# Patient Record
Sex: FEMALE | Race: BLACK OR AFRICAN AMERICAN | Employment: FULL TIME | URBAN - METROPOLITAN AREA
[De-identification: names, ages, dates, MRNs, and addresses within clinical notes are randomized per-mention and may not be internally consistent; named-entity substitution may affect disease eponyms.]

---

## 2017-03-27 ENCOUNTER — OFFICE VISIT (OUTPATIENT)
Dept: FAMILY MEDICINE CLINIC | Age: 32
End: 2017-03-27

## 2017-03-27 ENCOUNTER — HOSPITAL ENCOUNTER (OUTPATIENT)
Dept: LAB | Age: 32
Discharge: HOME | End: 2017-03-27

## 2017-03-27 VITALS
SYSTOLIC BLOOD PRESSURE: 100 MMHG | HEART RATE: 71 BPM | HEIGHT: 66 IN | OXYGEN SATURATION: 99 % | BODY MASS INDEX: 22.79 KG/M2 | TEMPERATURE: 98.8 F | WEIGHT: 141.8 LBS | RESPIRATION RATE: 16 BRPM | DIASTOLIC BLOOD PRESSURE: 68 MMHG

## 2017-03-27 DIAGNOSIS — N94.6 DYSMENORRHEA: ICD-10-CM

## 2017-03-27 DIAGNOSIS — Z11.3 SCREENING FOR STD (SEXUALLY TRANSMITTED DISEASE): ICD-10-CM

## 2017-03-27 DIAGNOSIS — R33.9 URINARY RETENTION: Primary | ICD-10-CM

## 2017-03-27 DIAGNOSIS — Z13.1 SCREENING FOR DIABETES MELLITUS: ICD-10-CM

## 2017-03-27 DIAGNOSIS — R33.9 URINARY RETENTION: ICD-10-CM

## 2017-03-27 DIAGNOSIS — Z13.220 SCREENING FOR HYPERLIPIDEMIA: ICD-10-CM

## 2017-03-27 DIAGNOSIS — N80.9 ENDOMETRIOSIS: ICD-10-CM

## 2017-03-27 LAB
BILIRUB UR QL STRIP: NEGATIVE
GLUCOSE UR-MCNC: NEGATIVE MG/DL
KETONES P FAST UR STRIP-MCNC: NORMAL MG/DL
PH UR STRIP: 6 [PH] (ref 4.6–8)
PROT UR QL STRIP: NEGATIVE MG/DL
SP GR UR STRIP: 1.02 (ref 1–1.03)
UA UROBILINOGEN AMB POC: NORMAL (ref 0.2–1)
URINALYSIS CLARITY POC: NORMAL
URINALYSIS COLOR POC: YELLOW
URINE BLOOD POC: NEGATIVE
URINE LEUKOCYTES POC: NORMAL
URINE NITRITES POC: NEGATIVE

## 2017-03-27 PROCEDURE — 99001 SPECIMEN HANDLING PT-LAB: CPT | Performed by: FAMILY MEDICINE

## 2017-03-27 RX ORDER — NAPROXEN 500 MG/1
500 TABLET ORAL 2 TIMES DAILY WITH MEALS
Qty: 60 TAB | Refills: 1 | Status: SHIPPED | OUTPATIENT
Start: 2017-03-27 | End: 2018-09-07

## 2017-03-27 RX ORDER — IBUPROFEN 200 MG
TABLET ORAL
COMMUNITY
End: 2017-03-27

## 2017-03-27 NOTE — MR AVS SNAPSHOT
Visit Information Date & Time Provider Department Dept. Phone Encounter #  
 3/27/2017  2:15 PM Tye Kyle, 0 [facility] [phone] [id number] Follow-up Instructions Return in about 3 months (around 6/27/2017). Upcoming Health Maintenance Date Due  
 PAP AKA CERVICAL CYTOLOGY 1/5/2018 DTaP/Tdap/Td series (2 - Td) 3/27/2027 Allergies as of 3/27/2017  Review Complete On: 3/27/2017 By: Tye Kyle MD  
 No Known Allergies Current Immunizations  Never Reviewed Name Date Influenza Vaccine 11/1/2016 Not reviewed this visit You Were Diagnosed With   
  
 Codes Comments Urinary retention    -  Primary ICD-10-CM: R33.9 ICD-9-CM: 788.20 Endometriosis     ICD-10-CM: N80.9 ICD-9-CM: 617.9 Dysmenorrhea     ICD-10-CM: N94.6 ICD-9-CM: 625.3 Screening for diabetes mellitus     ICD-10-CM: Z13.1 ICD-9-CM: V77.1 Screening for hyperlipidemia     ICD-10-CM: Z13.220 ICD-9-CM: V77.91 Screening for STD (sexually transmitted disease)     ICD-10-CM: Z11.3 ICD-9-CM: V74.5 Vitals BP Pulse Temp Resp Height(growth percentile) Weight(growth percentile) 100/68 (BP 1 Location: Left arm, BP Patient Position: Sitting) 71 98.8 °F (37.1 °C) (Oral) 16 5' 5.5\" (1.664 m) 141 lb 12.8 oz (64.3 kg) LMP SpO2 BMI Smoking Status 03/01/2017 99% 23.24 kg/m2 Never Smoker Vitals History BMI and BSA Data Body Mass Index Body Surface Area  
 23.24 kg/m 2 1.72 m 2 Preferred Pharmacy Pharmacy Name Phone [facility], [address] [place] [address] [phone] Your Updated Medication List  
  
   
This list is accurate as of: 3/27/17  3:27 PM.  Always use your most recent med list.  
  
  
  
  
 naproxen 500 mg tablet Commonly known as:  NAPROSYN Take 1 Tab by mouth two (2) times daily (with meals). Prescriptions Sent to Pharmacy Refills naproxen (NAPROSYN) 500 mg tablet 1 Sig: Take 1 Tab by mouth two (2) times daily (with meals). Class: Normal  
 Pharmacy: [id number] [facility], [address] [place] [address] #: [phone] Route: Oral  
  
We Performed the Following AMB POC URINALYSIS DIP STICK AUTO W/O MICRO [47849 CPT(R)] REFERRAL TO OBSTETRICS AND GYNECOLOGY [REF51 Custom] Comments:  
 Please evaluate patient for endometriosis. , chronic pelvic pain. Routine gyn care Follow-up Instructions Return in about 3 months (around 6/27/2017). To-Do List   
 03/27/2017 Lab:  CBC W/O DIFF   
  
 03/27/2017 Lab:  Murali Sandoval / Demetris Roberson   
  
 03/27/2017 Microbiology:  CULTURE, URINE   
  
 03/27/2017 Lab:  HEMOGLOBIN A1C WITH EAG   
  
 03/27/2017 Lab:  LIPID PANEL   
  
 03/27/2017 Lab:  METABOLIC PANEL, COMPREHENSIVE   
  
 03/27/2017 Lab:  RPR   
  
 03/27/2017 Lab:  TSH 3RD GENERATION   
  
 04/26/2017 Lab:  HIV 1/2 AG/AB, 4TH GENERATION,W RFLX CONFIRM Referral Information Referral ID Referred By Referred To  
  
 [id number] [facility] R Not Available Visits Status Start Date End Date 1 New Request 3/27/17 3/27/18 If your referral has a status of pending review or denied, additional information will be sent to support the outcome of this decision. Patient Instructions Endometriosis: Care Instructions Your Care Instructions Cells that are like the cells that line the inside your womb (uterus) sometimes grow on the outside of the uterus. This is called endometriosis. These clumps of cells can cause pain and problems with your periods. They can become inflamed and may bleed. Scar tissue that forms over time can make it difficult to get pregnant. Medicines and sometimes surgery can relieve pain and help women who want to get pregnant. Follow-up care is a key part of your treatment and safety.  Be sure to make and go to all appointments, and call your doctor if you are having problems. Its also a good idea to know your test results and keep a list of the medicines you take. How can you care for yourself at home? · Take your medicines exactly as prescribed. Call your doctor if you think you are having a problem with your medicine. · Take pain medicines exactly as directed. ¨ If the doctor gave you a prescription medicine for pain, take it as prescribed. ¨ If you are not taking a prescription pain medicine, ask your doctor if you can take an over-the-counter medicine. · Apply heat, such as a hot water bottle or a heating pad set on low, to your lower belly. Or take a warm bath. Heat may relieve pain. · Lie down and put a pillow under your knees to raise your legs. This will relieve pressure on your back. When should you call for help? Call 911 anytime you think you may need emergency care. For example, call if: 
· You passed out (lost consciousness). Call your doctor now or seek immediate medical care if: 
· You have sudden, severe pain in your belly or pelvis. Watch closely for changes in your health, and be sure to contact your doctor if: 
· You have new belly or pelvic pain. · You think you may be pregnant. · You do not get better as expected. Where can you learn more? Go to http://digna-santiago.info/. Enter R959 in the search box to learn more about \"Endometriosis: Care Instructions. \" Current as of: February 25, 2016 Content Version: 11.1 © 7859-4496 [employer]. Care instructions adapted under license by [employer] (which disclaims liability or warranty for this information). If you have questions about a medical condition or this instruction, always ask your healthcare professional. [place] [address] any warranty or liability for your use of this information. Introducing \[facility] [place] [facility]\"" & HEALTH SERVICES! Judy Ureña introduces [employer] patient portal. Now you can access parts of your medical record, email your doctor's office, and request medication refills online. 1. In your internet browser, go to [email or web address] 2. Click on the First Time User? Click Here link in the Sign In box. You will see the New Member Sign Up page. 3. Enter your [employer] Access Code exactly as it appears below. You will not need to use this code after youve completed the sign-up process. If you do not sign up before the expiration date, you must request a new code. · [employer] Access Code: [email or web address] Expires: 6/25/2017  9:18 AM 
 
4. Enter the last four digits of your Social Security Number (xxxx) and Date of Birth (mm/dd/yyyy) as indicated and click Submit. You will be taken to the next sign-up page. 5. Create a [employer] ID. This will be your [employer] login ID and cannot be changed, so think of one that is secure and easy to remember. 6. Create a [employer] password. You can change your password at any time. 7. Enter your Password Reset Question and Answer. This can be used at a later time if you forget your password. 8. Enter your e-mail address. You will receive e-mail notification when new information is available in [address] 9. Click Sign Up. You can now view and download portions of your medical record. 10. Click the Download Summary menu link to download a portable copy of your medical information. If you have questions, please visit the Frequently Asked Questions section of the [employer] website. Remember, [employer] is NOT to be used for urgent needs. For medical emergencies, dial 911. Now available from your iPhone and Android! Please provide this summary of care documentation to your next provider. Your primary care clinician is listed as Shalonda Perkins. If you have any questions after today's visit, please call [phone].

## 2017-03-27 NOTE — PROGRESS NOTES
1. Have you been to the ER, urgent care clinic since your last visit? Hospitalized since your last visit? No    2. Have you seen or consulted any other health care providers outside of the [address] [place] [address] since your last visit? Include any pap smears or colon screening.  Yes [facility] 12/2015    Last pap smear - [facility] - 3/2016   Last flu vaccine - 11/2016

## 2017-03-27 NOTE — PATIENT INSTRUCTIONS
Endometriosis: Care Instructions  Your Care Instructions  Cells that are like the cells that line the inside your womb (uterus) sometimes grow on the outside of the uterus. This is called endometriosis. These clumps of cells can cause pain and problems with your periods. They can become inflamed and may bleed. Scar tissue that forms over time can make it difficult to get pregnant. Medicines and sometimes surgery can relieve pain and help women who want to get pregnant. Follow-up care is a key part of your treatment and safety. Be sure to make and go to all appointments, and call your doctor if you are having problems. Its also a good idea to know your test results and keep a list of the medicines you take. How can you care for yourself at home? · Take your medicines exactly as prescribed. Call your doctor if you think you are having a problem with your medicine. · Take pain medicines exactly as directed. ¨ If the doctor gave you a prescription medicine for pain, take it as prescribed. ¨ If you are not taking a prescription pain medicine, ask your doctor if you can take an over-the-counter medicine. · Apply heat, such as a hot water bottle or a heating pad set on low, to your lower belly. Or take a warm bath. Heat may relieve pain. · Lie down and put a pillow under your knees to raise your legs. This will relieve pressure on your back. When should you call for help? Call 911 anytime you think you may need emergency care. For example, call if:  · You passed out (lost consciousness). Call your doctor now or seek immediate medical care if:  · You have sudden, severe pain in your belly or pelvis. Watch closely for changes in your health, and be sure to contact your doctor if:  · You have new belly or pelvic pain. · You think you may be pregnant. · You do not get better as expected. Where can you learn more? Go to http://digna-santiago.info/.   Enter U061 in the search box to learn more about \"Endometriosis: Care Instructions. \"  Current as of: February 25, 2016  Content Version: 11.1  © 5386-1630 [employer]. Care instructions adapted under license by [employer] (which disclaims liability or warranty for this information). If you have questions about a medical condition or this instruction, always ask your healthcare professional. [address] any warranty or liability for your use of this information.

## 2017-03-27 NOTE — PROGRESS NOTES
HISTORY OF PRESENT ILLNESS  Erica Bhatt is a 27 y.o. female. HPI: Here as a new patient to get establish with PCP. She was following ob/gyn at [facility]. Has h/o endometriosis and prior h/o rt oophorectomy. Now she has been having on and off cyst over left ovaries and at a time mild to moderate pain. Taking otc NSAID for pain and sometimes does not help. Still has painful and heavy period at a time. Was on birth control pill but not any more as she was trying to get pregnant and was following fertility specialist and was getting expensive so stopped for sometime and now going to see them again. Visit Vitals    /68 (BP 1 Location: Left arm, BP Patient Position: Sitting)    Pulse 71    Temp 98.8 °F (37.1 °C) (Oral)    Resp 16    Ht 5' 5.5\" (1.664 m)    Wt 141 lb 12.8 oz (64.3 kg)    SpO2 99%    BMI 23.24 kg/m2     Denies any headache, dizziness, no chest pain or trouble breathing, no arm or leg weakness. No nausea or vomiting, no weight or appetite changes, no depression or anxiety. No bowel complains, no palpitation, no diaphoresis. Review medication list, vitals, problem list,allergies. Review past./ personal/ family / surgical history. Today also concern with some discomfort with urination. UA at the office showed no signs of infection. No dysuria. ROS: see HPI     Physical Exam   Constitutional: She is oriented to person, place, and time. No distress. Neck: No thyromegaly present. Cardiovascular: Normal rate, regular rhythm and normal heart sounds. Pulmonary/Chest:   CTA   Abdominal: Soft. Bowel sounds are normal. There is no tenderness. Musculoskeletal: She exhibits no edema. Lymphadenopathy:     She has no cervical adenopathy. Neurological: She is oriented to person, place, and time. Psychiatric: Thought content normal.       ASSESSMENT and PLAN    ICD-10-CM ICD-9-CM    1. Urinary retention: some discomfort. Able to pass urine. UA negative for infection. Discuss with her that adhesions from prior surgery can do that and current endometriosis and inflammation from it can make her feel discomfort. For now she will see fertility specialist and also referred her back to [facility] for ob/gyn. Due for pap smear as she has h/o abnormal pap smear and last was done in 2015. R33.9 788.20 AMB POC URINALYSIS DIP STICK AUTO W/O MICRO      CULTURE, URINE      REFERRAL TO OBSTETRICS AND GYNECOLOGY   2. Endometriosis/ removal of rt ovarie, now left ovarian cyst  N80.9 617.9 REFERRAL TO OBSTETRICS AND GYNECOLOGY      naproxen (NAPROSYN) 500 mg tablet   3. Dysmenorrhea: probably from endometriosis. Will follow ob/gyn recommendations. N94.6 625.3 REFERRAL TO OBSTETRICS AND GYNECOLOGY      CBC W/O DIFF      METABOLIC PANEL, COMPREHENSIVE      TSH 3RD GENERATION      naproxen (NAPROSYN) 500 mg tablet   4. Screening for diabetes mellitus Z13.1 V77.1 HEMOGLOBIN A1C WITH EAG   5. Screening for hyperlipidemia Z13.220 V77.91 LIPID PANEL   6. Screening for STD (sexually transmitted disease) Z11.3 V74.5 HIV 1/2 AG/AB, 4TH GENERATION,W RFLX CONFIRM      RPR      CHLAMYDIA / GC AMPLIFICATION   Pt understood and agrees with above plan. Review HM  She wants STD screening. Also was concern that day time nap she can not do. Sleeps well during night time and no concern. i have explain to her that some people has sleep cycle adjusted that way. For now will observe. Wants preventive labs to be done which ordered during visit. Follow-up Disposition:  Return in about 3 months (around 6/27/2017).

## 2017-03-29 LAB — BACTERIA UR CULT: NORMAL

## 2017-03-29 NOTE — PROGRESS NOTES
Spoke with patient (2 verifiers name/) regarding urine culture is negative. Patient voiced understanding.

## 2017-03-30 LAB
ALBUMIN SERPL-MCNC: 4.8 G/DL (ref 3.5–5.5)
ALBUMIN/GLOB SERPL: 1.3 {RATIO} (ref 1.2–2.2)
ALP SERPL-CCNC: 58 IU/L (ref 39–117)
ALT SERPL-CCNC: 15 IU/L (ref 0–32)
AST SERPL-CCNC: 27 IU/L (ref 0–40)
BILIRUB SERPL-MCNC: 0.6 MG/DL (ref 0–1.2)
BUN SERPL-MCNC: 11 MG/DL (ref 6–20)
BUN/CREAT SERPL: 11 (ref 8–20)
C TRACH RRNA SPEC QL NAA+PROBE: NEGATIVE
CALCIUM SERPL-MCNC: 10 MG/DL (ref 8.7–10.2)
CHLORIDE SERPL-SCNC: 96 MMOL/L (ref 96–106)
CHOLEST SERPL-MCNC: 166 MG/DL (ref 100–199)
CO2 SERPL-SCNC: 26 MMOL/L (ref 18–29)
CREAT SERPL-MCNC: 0.96 MG/DL (ref 0.57–1)
ERYTHROCYTE [DISTWIDTH] IN BLOOD BY AUTOMATED COUNT: 16.9 % (ref 12.3–15.4)
EST. AVERAGE GLUCOSE BLD GHB EST-MCNC: 114 MG/DL
GLOBULIN SER CALC-MCNC: 3.6 G/DL (ref 1.5–4.5)
GLUCOSE SERPL-MCNC: 73 MG/DL (ref 65–99)
HBA1C MFR BLD: 5.6 % (ref 4.8–5.6)
HCT VFR BLD AUTO: 38.4 % (ref 34–46.6)
HDLC SERPL-MCNC: 55 MG/DL
HGB BLD-MCNC: 11.8 G/DL (ref 11.1–15.9)
HIV 1+2 AB+HIV1 P24 AG SERPL QL IA: NON REACTIVE
INTERPRETATION, 910389: NORMAL
LDLC SERPL CALC-MCNC: 95 MG/DL (ref 0–99)
MCH RBC QN AUTO: 22.5 PG (ref 26.6–33)
MCHC RBC AUTO-ENTMCNC: 30.7 G/DL (ref 31.5–35.7)
MCV RBC AUTO: 73 FL (ref 79–97)
N GONORRHOEA RRNA SPEC QL NAA+PROBE: NEGATIVE
PLATELET # BLD AUTO: 275 X10E3/UL (ref 150–379)
POTASSIUM SERPL-SCNC: 3.9 MMOL/L (ref 3.5–5.2)
PROT SERPL-MCNC: 8.4 G/DL (ref 6–8.5)
RBC # BLD AUTO: 5.24 X10E6/UL (ref 3.77–5.28)
RPR SER QL: NON REACTIVE
SODIUM SERPL-SCNC: 138 MMOL/L (ref 134–144)
TRIGL SERPL-MCNC: 79 MG/DL (ref 0–149)
TSH SERPL DL<=0.005 MIU/L-ACNC: 2.14 UIU/ML (ref 0.45–4.5)
VLDLC SERPL CALC-MCNC: 16 MG/DL (ref 5–40)
WBC # BLD AUTO: 4.1 X10E3/UL (ref 3.4–10.8)

## 2017-03-31 NOTE — PROGRESS NOTES
Spoke with patient (2 verifies name/) regarding labs are ok including HIV and RPR test.  Patient voiced understanding.

## 2017-04-03 ENCOUNTER — TELEPHONE (OUTPATIENT)
Dept: FAMILY MEDICINE CLINIC | Age: 32
End: 2017-04-03

## 2017-04-11 NOTE — TELEPHONE ENCOUNTER
Patient came by the office and questions regarding lab test results were answered to patient's satisfaction. Closing encounter.

## 2017-11-07 ENCOUNTER — OFFICE VISIT (OUTPATIENT)
Dept: FAMILY MEDICINE CLINIC | Age: 32
End: 2017-11-07

## 2017-11-07 VITALS
HEART RATE: 69 BPM | SYSTOLIC BLOOD PRESSURE: 100 MMHG | OXYGEN SATURATION: 99 % | BODY MASS INDEX: 23.18 KG/M2 | RESPIRATION RATE: 16 BRPM | DIASTOLIC BLOOD PRESSURE: 66 MMHG | WEIGHT: 144.2 LBS | TEMPERATURE: 97.9 F | HEIGHT: 66 IN

## 2017-11-07 DIAGNOSIS — N80.9 ENDOMETRIOSIS: ICD-10-CM

## 2017-11-07 DIAGNOSIS — N89.8 VAGINAL ITCHING: ICD-10-CM

## 2017-11-07 DIAGNOSIS — F41.9 ANXIETY: ICD-10-CM

## 2017-11-07 DIAGNOSIS — R11.0 NAUSEA: ICD-10-CM

## 2017-11-07 DIAGNOSIS — Z00.00 WELL WOMAN EXAM (NO GYNECOLOGICAL EXAM): Primary | ICD-10-CM

## 2017-11-07 DIAGNOSIS — Z23 ENCOUNTER FOR IMMUNIZATION: ICD-10-CM

## 2017-11-07 RX ORDER — RANITIDINE 150 MG/1
150 TABLET, FILM COATED ORAL 2 TIMES DAILY
Qty: 60 TAB | Refills: 1 | Status: SHIPPED | OUTPATIENT
Start: 2017-11-07 | End: 2018-09-07

## 2017-11-07 NOTE — PROGRESS NOTES
1. Have you been to the ER, urgent care clinic since your last visit? Hospitalized since your last visit? No    2. Have you seen or consulted any other health care providers outside of the [address] [place] [address] since your last visit? Include any pap smears or colon screening. No    Naproxen is not helping with pain and wants to discuss. Last pap 6/05/17. Abnormal Pap smears Yes at 32 yo. Procedures if indicated - surgery for ovarian cyst.Form of contraception No. Mammogram No Family history of breast CA No, colon CA No, cervical CA No.Tetanus No.    Patient has not had flu vaccine; requests today.

## 2017-11-07 NOTE — PATIENT INSTRUCTIONS
Well Visit, Ages 25 to 48: Care Instructions  Your Care Instructions    Physical exams can help you stay healthy. Your doctor has checked your overall health and may have suggested ways to take good care of yourself. He or she also may have recommended tests. At home, you can help prevent illness with healthy eating, regular exercise, and other steps. Follow-up care is a key part of your treatment and safety. Be sure to make and go to all appointments, and call your doctor if you are having problems. It's also a good idea to know your test results and keep a list of the medicines you take. How can you care for yourself at home? · Reach and stay at a healthy weight. This will lower your risk for many problems, such as obesity, diabetes, heart disease, and high blood pressure. · Get at least 30 minutes of physical activity on most days of the week. Walking is a good choice. You also may want to do other activities, such as running, swimming, cycling, or playing tennis or team sports. Discuss any changes in your exercise program with your doctor. · Do not smoke or allow others to smoke around you. If you need help quitting, talk to your doctor about stop-smoking programs and medicines. These can increase your chances of quitting for good. · Talk to your doctor about whether you have any risk factors for sexually transmitted infections (STIs). Having one sex partner (who does not have STIs and does not have sex with anyone else) is a good way to avoid these infections. · Use birth control if you do not want to have children at this time. Talk with your doctor about the choices available and what might be best for you. · Protect your skin from too much sun. When you're outdoors from 10 a.m. to 4 p.m., stay in the shade or cover up with clothing and a hat with a wide brim. Wear sunglasses that block UV rays. Even when it's cloudy, put broad-spectrum sunscreen (SPF 30 or higher) on any exposed skin.   · See a dentist one or two times a year for checkups and to have your teeth cleaned. · Wear a seat belt in the car. · Drink alcohol in moderation, if at all. That means no more than 2 drinks a day for men and 1 drink a day for women. Follow your doctor's advice about when to have certain tests. These tests can spot problems early. For everyone  · Cholesterol. Have the fat (cholesterol) in your blood tested after age 21. Your doctor will tell you how often to have this done based on your age, family history, or other things that can increase your risk for heart disease. · Blood pressure. Have your blood pressure checked during a routine doctor visit. Your doctor will tell you how often to check your blood pressure based on your age, your blood pressure results, and other factors. · Vision. Talk with your doctor about how often to have a glaucoma test.  · Diabetes. Ask your doctor whether you should have tests for diabetes. · Colon cancer. Have a test for colon cancer at age 48. You may have one of several tests. If you are younger than 48, you may need a test earlier if you have any risk factors. Risk factors include whether you already had a precancerous polyp removed from your colon or whether your parent, brother, sister, or child has had colon cancer. For women  · Breast exam and mammogram. Talk to your doctor about when you should have a clinical breast exam and a mammogram. Medical experts differ on whether and how often women under 50 should have these tests. Your doctor can help you decide what is right for you. · Pap test and pelvic exam. Begin Pap tests at age 24. A Pap test is the best way to find cervical cancer. The test often is part of a pelvic exam. Ask how often to have this test.  · Tests for sexually transmitted infections (STIs). Ask whether you should have tests for STIs. You may be at risk if you have sex with more than one person, especially if your partners do not wear condoms.   For men  · Tests for sexually transmitted infections (STIs). Ask whether you should have tests for STIs. You may be at risk if you have sex with more than one person, especially if you do not wear a condom. · Testicular cancer exam. Ask your doctor whether you should check your testicles regularly. · Prostate exam. Talk to your doctor about whether you should have a blood test (called a PSA test) for prostate cancer. Experts differ on whether and when men should have this test. Some experts suggest it if you are older than 39 and are -American or have a father or brother who got prostate cancer when he was younger than 72. When should you call for help? Watch closely for changes in your health, and be sure to contact your doctor if you have any problems or symptoms that concern you. Where can you learn more? Go to http://digna-santiago.info/. Enter P072 in the search box to learn more about \"Well Visit, Ages 25 to 48: Care Instructions. \"  Current as of: May 12, 2017  Content Version: 11.4  © 2677-8994 [employer]. Care instructions adapted under license by [employer] (which disclaims liability or warranty for this information). If you have questions about a medical condition or this instruction, always ask your healthcare professional. [place] [address] any warranty or liability for your use of this information. Gastroesophageal Reflux Disease (GERD): Care Instructions  Your Care Instructions    Gastroesophageal reflux disease (GERD) is the backward flow of stomach acid into the esophagus. The esophagus is the tube that leads from your throat to your stomach. A one-way valve prevents the stomach acid from moving up into this tube. When you have GERD, this valve does not close tightly enough. If you have mild GERD symptoms including heartburn, you may be able to control the problem with antacids or over-the-counter medicine.  Changing your diet, losing weight, and making other lifestyle changes can also help reduce symptoms. Follow-up care is a key part of your treatment and safety. Be sure to make and go to all appointments, and call your doctor if you are having problems. It's also a good idea to know your test results and keep a list of the medicines you take. How can you care for yourself at home? · Take your medicines exactly as prescribed. Call your doctor if you think you are having a problem with your medicine. · Your doctor may recommend over-the-counter medicine. For mild or occasional indigestion, antacids, such as Tums, Gaviscon, Mylanta, or Maalox, may help. Your doctor also may recommend over-the-counter acid reducers, such as Pepcid AC, Tagamet HB, Zantac 75, or Prilosec. Read and follow all instructions on the label. If you use these medicines often, talk with your doctor. · Change your eating habits. ¨ It's best to eat several small meals instead of two or three large meals. ¨ After you eat, wait 2 to 3 hours before you lie down. ¨ Chocolate, mint, and alcohol can make GERD worse. ¨ Spicy foods, foods that have a lot of acid (like tomatoes and oranges), and coffee can make GERD symptoms worse in some people. If your symptoms are worse after you eat a certain food, you may want to stop eating that food to see if your symptoms get better. · Do not smoke or chew tobacco. Smoking can make GERD worse. If you need help quitting, talk to your doctor about stop-smoking programs and medicines. These can increase your chances of quitting for good. · If you have GERD symptoms at night, raise the head of your bed 6 to 8 inches by putting the frame on blocks or placing a foam wedge under the head of your mattress. (Adding extra pillows does not work.)  · Do not wear tight clothing around your middle. · Lose weight if you need to. Losing just 5 to 10 pounds can help. When should you call for help?   Call your doctor now or seek immediate medical care if:  ? · You have new or different belly pain. ? · Your stools are black and tarlike or have streaks of blood. ? Watch closely for changes in your health, and be sure to contact your doctor if:  ? · Your symptoms have not improved after 2 days. ? · Food seems to catch in your throat or chest.   Where can you learn more? Go to http://digna-santiago.info/. Enter Q124 in the search box to learn more about \"Gastroesophageal Reflux Disease (GERD): Care Instructions. \"  Current as of: May 12, 2017  Content Version: 11.4  © 4922-0271 [employer]. Care instructions adapted under license by [employer] (which disclaims liability or warranty for this information). If you have questions about a medical condition or this instruction, always ask your healthcare professional. [address] any warranty or liability for your use of this information. Vaccine Information Statement    Influenza (Flu) Vaccine (Inactivated or Recombinant): What you need to know    Many Vaccine Information Statements are available in Korean and other languages. See www.immunize.org/vis  Hojas de Información Sobre Vacunas están disponibles en Español y en muchos otros idiomas. Visite www.immunize.org/vis    1. Why get vaccinated? Influenza (flu) is a contagious disease that spreads around the [place] every year, usually between October and May. Flu is caused by influenza viruses, and is spread mainly by coughing, sneezing, and close contact. Anyone can get flu. Flu strikes suddenly and can last several days. Symptoms vary by age, but can include:   fever/chills   sore throat   muscle aches   fatigue   cough   headache    runny or stuffy nose    Flu can also lead to pneumonia and blood infections, and cause diarrhea and seizures in children. If you have a medical condition, such as heart or lung disease, flu can make it worse.     Flu is more dangerous for some people. Infants and young children, people 72years of age and older, pregnant women, and people with certain health conditions or a weakened immune system are at greatest risk. Each year thousands of people in the [facility] die from flu, and many more are hospitalized. Flu vaccine can:   keep you from getting flu,   make flu less severe if you do get it, and   keep you from spreading flu to your family and other people. 2. Inactivated and recombinant flu vaccines    A dose of flu vaccine is recommended every flu season. Children 6 months through 6years of age may need two doses during the same flu season. Everyone else needs only one dose each flu season. Some inactivated flu vaccines contain a very small amount of a mercury-based preservative called thimerosal. Studies have not shown thimerosal in vaccines to be harmful, but flu vaccines that do not contain thimerosal are available. There is no live flu virus in flu shots. They cannot cause the flu. There are many flu viruses, and they are always changing. Each year a new flu vaccine is made to protect against three or four viruses that are likely to cause disease in the upcoming flu season. But even when the vaccine doesnt exactly match these viruses, it may still provide some protection    Flu vaccine cannot prevent:   flu that is caused by a virus not covered by the vaccine, or   illnesses that look like flu but are not. It takes about 2 weeks for protection to develop after vaccination, and protection lasts through the flu season. 3. Some people should not get this vaccine    Tell the person who is giving you the vaccine:     If you have any severe, life-threatening allergies. If you ever had a life-threatening allergic reaction after a dose of flu vaccine, or have a severe allergy to any part of this vaccine, you may be advised not to get vaccinated.   Most, but not all, types of flu vaccine contain a small amount of egg protein.  If you ever had Guillain-Barré Syndrome (also called GBS). Some people with a history of GBS should not get this vaccine. This should be discussed with your doctor.  If you are not feeling well. It is usually okay to get flu vaccine when you have a mild illness, but you might be asked to come back when you feel better. 4. Risks of a vaccine reaction    With any medicine, including vaccines, there is a chance of reactions. These are usually mild and go away on their own, but serious reactions are also possible. Most people who get a flu shot do not have any problems with it. Minor problems following a flu shot include:    soreness, redness, or swelling where the shot was given     hoarseness   sore, red or itchy eyes   cough   fever   aches   headache   itching   fatigue  If these problems occur, they usually begin soon after the shot and last 1 or 2 days. More serious problems following a flu shot can include the following:     There may be a small increased risk of Guillain-Barré Syndrome (GBS) after inactivated flu vaccine. This risk has been estimated at 1 or 2 additional cases per million people vaccinated. This is much lower than the risk of severe complications from flu, which can be prevented by flu vaccine.  Young children who get the flu shot along with pneumococcal vaccine (PCV13) and/or DTaP vaccine at the same time might be slightly more likely to have a seizure caused by fever. Ask your doctor for more information. Tell your doctor if a child who is getting flu vaccine has ever had a seizure. Problems that could happen after any injected vaccine:      People sometimes faint after a medical procedure, including vaccination. Sitting or lying down for about 15 minutes can help prevent fainting, and injuries caused by a fall.  Tell your doctor if you feel dizzy, or have vision changes or ringing in the ears.     Some people get severe pain in the shoulder and have difficulty moving the arm where a shot was given. This happens very rarely.  Any medication can cause a severe allergic reaction. Such reactions from a vaccine are very rare, estimated at about 1 in a million doses, and would happen within a few minutes to a few hours after the vaccination. As with any medicine, there is a very remote chance of a vaccine causing a serious injury or death. The safety of vaccines is always being monitored. For more information, visit: www.cdc.gov/vaccinesafety/    5. What if there is a serious reaction? What should I look for?  Look for anything that concerns you, such as signs of a severe allergic reaction, very high fever, or unusual behavior. Signs of a severe allergic reaction can include hives, swelling of the face and throat, difficulty breathing, a fast heartbeat, dizziness, and weakness - usually within a few minutes to a few hours after the vaccination. What should I do?  If you think it is a severe allergic reaction or other emergency that cant wait, call 9-1-1 and get the person to the nearest hospital. Otherwise, call your doctor.  Reactions should be reported to the Vaccine Adverse Event Reporting System (VAERS). Your doctor should file this report, or you can do it yourself through  the VAERS web site at www.vaers. hhs.gov, or by calling 9-626.955.8658. VAERS does not give medical advice. 6. The National Vaccine Injury Compensation Program    The [facility] Vaccine Injury Compensation Program (VICP) is a federal program that was created to compensate people who may have been injured by certain vaccines. Persons who believe they may have been injured by a vaccine can learn about the program and about filing a claim by calling 2-694.593.8485 or visiting the [employer] website at www.[facility].gov/vaccinecompensation. There is a time limit to file a claim for compensation. 7.  How can I learn more?  Ask your healthcare provider. He or she can give you the vaccine package insert or suggest other sources of information.  Call your local or state health department.  Contact the Centers for Disease Control and Prevention (CDC):  - Call 7-404.929.3934 (1-800-CDC-INFO) or  - Visit CDCs website at www.cdc.gov/flu    Vaccine Information Statement   Inactivated Influenza Vaccine   8/7/2015  42 MYRA [place] [id number]    Department of Health and Human Services  Centers for Disease Control and Prevention    Office Use Only

## 2017-11-07 NOTE — PROGRESS NOTES
Subjective:   34 y.o. female for Well Woman Check. Her gyne and breast care is done elsewhere by her Ob-Gyne physician. Has h/o endometriosis. Periods are mostly regular but painful. She is trying to conceive now so wants to be off medication. Currently using naproxen but not helping much. For now discussed to take tylenol along with motrin. Pap smear recently had at ob/gyn. Was normal per patient. Prior h/o chlamydia as very young age. Sexually active with one partner. Not using protection currently as trying to conceive. No pelvic pain. No vaginal discharge. Said she felt some itching yesterday as just getting over with menstrual period. Said she has h/o anxiety. Not on any medication as she has learned how to cope up with it. Said but when there is test at school or she is worried it gets worse on and off. She does not want any medication or counseling at this time but wanted to let me know. Said feels nausea on and off. More around menstrual period and when she is anxious. Denies any GERD symptoms. No burping. No bloating. No abdominal pain. No vomiting. Patient Active Problem List    Diagnosis Date Noted    Anxiety/ occassionally. not on any medication  11/07/2017    Endometriosis/ removal of rt ovarie, now left ovarian cyst  03/27/2017     Current Outpatient Prescriptions   Medication Sig Dispense Refill    raNITIdine (ZANTAC) 150 mg tablet Take 1 Tab by mouth two (2) times a day. 60 Tab 1    naproxen (NAPROSYN) 500 mg tablet Take 1 Tab by mouth two (2) times daily (with meals). 60 Tab 1     No Known Allergies  No past medical history on file.   Past Surgical History:   Procedure Laterality Date    HX OTHER SURGICAL      ovarian cyst on right ovary - 8/2010     Family History   Problem Relation Age of Onset    Substance Abuse Mother      tobacco use    Hypertension Mother     Heart Failure Mother     Kidney Disease Mother      Social History   Substance Use Topics    Smoking status: Never Smoker    Smokeless tobacco: Never Used    Alcohol use Yes      Comment: occ        Review recent labs. Lab Results   Component Value Date/Time    Hemoglobin A1c 5.6 03/27/2017 12:00 AM     Lab Results   Component Value Date/Time    Sodium 138 03/27/2017 12:00 AM    Potassium 3.9 03/27/2017 12:00 AM    Chloride 96 03/27/2017 12:00 AM    CO2 26 03/27/2017 12:00 AM    Glucose 73 03/27/2017 12:00 AM    BUN 11 03/27/2017 12:00 AM    Creatinine 0.96 03/27/2017 12:00 AM    BUN/Creatinine ratio 11 03/27/2017 12:00 AM    GFR est AA 90 03/27/2017 12:00 AM    GFR est non-AA 79 03/27/2017 12:00 AM    Calcium 10.0 03/27/2017 12:00 AM    Bilirubin, total 0.6 03/27/2017 12:00 AM    AST (SGOT) 27 03/27/2017 12:00 AM    Alk. phosphatase 58 03/27/2017 12:00 AM    Protein, total 8.4 03/27/2017 12:00 AM    Albumin 4.8 03/27/2017 12:00 AM    A-G Ratio 1.3 03/27/2017 12:00 AM    ALT (SGPT) 15 03/27/2017 12:00 AM     Lab Results   Component Value Date/Time    Cholesterol, total 166 03/27/2017 12:00 AM    HDL Cholesterol 55 03/27/2017 12:00 AM    LDL, calculated 95 03/27/2017 12:00 AM    VLDL, calculated 16 03/27/2017 12:00 AM    Triglyceride 79 03/27/2017 12:00 AM     Lab Results   Component Value Date/Time    TSH 2.140 03/27/2017 12:00 AM     Lab Results   Component Value Date/Time    WBC 4.1 03/27/2017 12:00 AM    HGB 11.8 03/27/2017 12:00 AM    HCT 38.4 03/27/2017 12:00 AM    PLATELET 199 19/62/9285 12:00 AM    MCV 73 03/27/2017 12:00 AM         ROS: Feeling generally well. No TIA's or unusual headaches, no dysphagia. No prolonged cough. No dyspnea or chest pain on exertion. No abdominal pain, change in bowel habits, black or bloody stools. No urinary tract symptoms. No new or unusual musculoskeletal symptoms. Objective: The patient appears well, alert, oriented x 3, in no distress.   Visit Vitals    /66 (BP 1 Location: Left arm, BP Patient Position: Sitting)    Pulse 69    Temp 97.9 °F (36.6 °C) (Oral)    Resp 16    Ht 5' 5.5\" (1.664 m)    Wt 144 lb 3.2 oz (65.4 kg)    LMP 10/29/2017    SpO2 99%    BMI 23.63 kg/m2     ENT normal.  Neck supple. No adenopathy or thyromegaly. CYNTHIA. Lungs are clear, good air entry, no wheezes, rhonchi or rales. S1 and S2 normal, no murmurs, regular rate and rhythm. Abdomen soft without tenderness, guarding, mass or organomegaly. Extremities show no edema, normal peripheral pulses. Neurological is normal, no focal findings. Breast and Pelvic exams are deferred as done at ob/gyn . Will obtain records. Assessment/Plan:       ICD-10-CM ICD-9-CM    1. Well woman exam (no gynecological exam) Z00.00 V70.0     [V70.0]   2. Endometriosis: will be following ob/gyn. Pain is worse with period but using NSAIds as needed. Advised to use tylenol in between. N80.9 617.9    3. Nausea: for now will give trial of gerd treatment. Giving zantac. Advised her to stop all medication if she becomes pregnant. R11.0 787.02 raNITIdine (ZANTAC) 150 mg tablet   4. Anxiety: occasional. See HPI. For now will observe. She wanted to let me know. Currently no concern. Refuse psychiatrist or counseling at this time. F41.9 300.00    5. Vaginal itching/ just ending menstrual period : for now advised to observe and if symptoms continue she will return. L29.8 698.1    6. Encounter for immunization Z23 V03.89 INFLUENZA VIRUS VAC QUAD,SPLIT,PRESV FREE SYRINGE IM   Pt understood and agree with the plan   Review    Follow-up Disposition:  Return in about 1 month (around 12/7/2017).

## 2017-11-07 NOTE — MR AVS SNAPSHOT
Visit Information Date & Time Provider Department Dept. Phone Encounter #  
 11/7/2017  1:00 PM Abhishek Keating, [address] [id number] Follow-up Instructions Return in about 1 month (around 12/7/2017). Upcoming Health Maintenance Date Due Influenza Age 5 to Adult 8/1/2017 PAP AKA CERVICAL CYTOLOGY 6/8/2020 DTaP/Tdap/Td series (2 - Td) 11/7/2027 Allergies as of 11/7/2017  Review Complete On: 11/7/2017 By: Abhishek Keating MD  
 No Known Allergies Current Immunizations  Never Reviewed Name Date Influenza Vaccine 11/1/2016 Influenza Vaccine (Quad) PF 11/7/2017 Not reviewed this visit You Were Diagnosed With   
  
 Codes Comments Well woman exam (no gynecological exam)    -  Primary ICD-10-CM: Z00.00 ICD-9-CM: V70.0 [V70.0] Endometriosis     ICD-10-CM: N80.9 ICD-9-CM: 617.9 Nausea     ICD-10-CM: R11.0 ICD-9-CM: 787.02 Anxiety     ICD-10-CM: F41.9 ICD-9-CM: 300.00 Vaginal itching     ICD-10-CM: L29.8 ICD-9-CM: 698.1 Encounter for immunization     ICD-10-CM: D25 ICD-9-CM: V03.89 Vitals BP Pulse Temp Resp Height(growth percentile) Weight(growth percentile) 100/66 (BP 1 Location: Left arm, BP Patient Position: Sitting) 69 97.9 °F (36.6 °C) (Oral) 16 5' 5.5\" (1.664 m) 144 lb 3.2 oz (65.4 kg) LMP SpO2 BMI Smoking Status 10/29/2017 99% 23.63 kg/m2 Never Smoker BMI and BSA Data Body Mass Index Body Surface Area  
 23.63 kg/m 2 1.74 m 2 Preferred Pharmacy Pharmacy Name Phone [facility], [address] [facility] [phone] Your Updated Medication List  
  
   
This list is accurate as of: 11/7/17  1:59 PM.  Always use your most recent med list.  
  
  
  
  
 naproxen 500 mg tablet Commonly known as:  NAPROSYN Take 1 Tab by mouth two (2) times daily (with meals). raNITIdine 150 mg tablet Commonly known as:  ZANTAC Take 1 Tab by mouth two (2) times a day. Prescriptions Sent to Pharmacy Refills  
 raNITIdine (ZANTAC) 150 mg tablet 1 Sig: Take 1 Tab by mouth two (2) times a day. Class: Normal  
 Pharmacy: [id number] [address] [facility], [address] [place] [address] #: [phone] Route: Oral  
  
We Performed the Following INFLUENZA VIRUS VAC QUAD,SPLIT,PRESV FREE SYRINGE IM [id number] CPT(R)] Follow-up Instructions Return in about 1 month (around 12/7/2017). Patient Instructions Well Visit, Ages 25 to 48: Care Instructions Your Care Instructions Physical exams can help you stay healthy. Your doctor has checked your overall health and may have suggested ways to take good care of yourself. He or she also may have recommended tests. At home, you can help prevent illness with healthy eating, regular exercise, and other steps. Follow-up care is a key part of your treatment and safety. Be sure to make and go to all appointments, and call your doctor if you are having problems. It's also a good idea to know your test results and keep a list of the medicines you take. How can you care for yourself at home? · Reach and stay at a healthy weight. This will lower your risk for many problems, such as obesity, diabetes, heart disease, and high blood pressure. · Get at least 30 minutes of physical activity on most days of the week. Walking is a good choice. You also may want to do other activities, such as running, swimming, cycling, or playing tennis or team sports. Discuss any changes in your exercise program with your doctor. · Do not smoke or allow others to smoke around you. If you need help quitting, talk to your doctor about stop-smoking programs and medicines. These can increase your chances of quitting for good.  
· Talk to your doctor about whether you have any risk factors for sexually transmitted infections (STIs). Having one sex partner (who does not have STIs and does not have sex with anyone else) is a good way to avoid these infections. · Use birth control if you do not want to have children at this time. Talk with your doctor about the choices available and what might be best for you. · Protect your skin from too much sun. When you're outdoors from 10 a.m. to 4 p.m., stay in the shade or cover up with clothing and a hat with a wide brim. Wear sunglasses that block UV rays. Even when it's cloudy, put broad-spectrum sunscreen (SPF 30 or higher) on any exposed skin. · See a dentist one or two times a year for checkups and to have your teeth cleaned. · Wear a seat belt in the car. · Drink alcohol in moderation, if at all. That means no more than 2 drinks a day for men and 1 drink a day for women. Follow your doctor's advice about when to have certain tests. These tests can spot problems early. For everyone · Cholesterol. Have the fat (cholesterol) in your blood tested after age 21. Your doctor will tell you how often to have this done based on your age, family history, or other things that can increase your risk for heart disease. · Blood pressure. Have your blood pressure checked during a routine doctor visit. Your doctor will tell you how often to check your blood pressure based on your age, your blood pressure results, and other factors. · Vision. Talk with your doctor about how often to have a glaucoma test. 
· Diabetes. Ask your doctor whether you should have tests for diabetes. · Colon cancer. Have a test for colon cancer at age 48. You may have one of several tests. If you are younger than 48, you may need a test earlier if you have any risk factors. Risk factors include whether you already had a precancerous polyp removed from your colon or whether your parent, brother, sister, or child has had colon cancer. For women · Breast exam and mammogram. Talk to your doctor about when you should have a clinical breast exam and a mammogram. Medical experts differ on whether and how often women under 50 should have these tests. Your doctor can help you decide what is right for you. · Pap test and pelvic exam. Begin Pap tests at age 24. A Pap test is the best way to find cervical cancer. The test often is part of a pelvic exam. Ask how often to have this test. 
· Tests for sexually transmitted infections (STIs). Ask whether you should have tests for STIs. You may be at risk if you have sex with more than one person, especially if your partners do not wear condoms. For men · Tests for sexually transmitted infections (STIs). Ask whether you should have tests for STIs. You may be at risk if you have sex with more than one person, especially if you do not wear a condom. · Testicular cancer exam. Ask your doctor whether you should check your testicles regularly. · Prostate exam. Talk to your doctor about whether you should have a blood test (called a PSA test) for prostate cancer. Experts differ on whether and when men should have this test. Some experts suggest it if you are older than 39 and are -American or have a father or brother who got prostate cancer when he was younger than 72. When should you call for help? Watch closely for changes in your health, and be sure to contact your doctor if you have any problems or symptoms that concern you. Where can you learn more? Go to http://digna-santiago.info/. Enter P072 in the search box to learn more about \"Well Visit, Ages 25 to 48: Care Instructions. \" Current as of: May 12, 2017 Content Version: 11.4 © 7383-6480 Healthwise, Incorporated. Care instructions adapted under license by [employer] (which disclaims liability or warranty for this information).  If you have questions about a medical condition or this instruction, always ask your healthcare professional. [place] [address] any warranty or liability for your use of this information. Gastroesophageal Reflux Disease (GERD): Care Instructions Your Care Instructions Gastroesophageal reflux disease (GERD) is the backward flow of stomach acid into the esophagus. The esophagus is the tube that leads from your throat to your stomach. A one-way valve prevents the stomach acid from moving up into this tube. When you have GERD, this valve does not close tightly enough. If you have mild GERD symptoms including heartburn, you may be able to control the problem with antacids or over-the-counter medicine. Changing your diet, losing weight, and making other lifestyle changes can also help reduce symptoms. Follow-up care is a key part of your treatment and safety. Be sure to make and go to all appointments, and call your doctor if you are having problems. It's also a good idea to know your test results and keep a list of the medicines you take. How can you care for yourself at home? · Take your medicines exactly as prescribed. Call your doctor if you think you are having a problem with your medicine. · Your doctor may recommend over-the-counter medicine. For mild or occasional indigestion, antacids, such as Tums, Gaviscon, Mylanta, or Maalox, may help. Your doctor also may recommend over-the-counter acid reducers, such as Pepcid AC, Tagamet HB, Zantac 75, or Prilosec. Read and follow all instructions on the label. If you use these medicines often, talk with your doctor. · Change your eating habits. ¨ It's best to eat several small meals instead of two or three large meals. ¨ After you eat, wait 2 to 3 hours before you lie down. ¨ Chocolate, mint, and alcohol can make GERD worse. ¨ Spicy foods, foods that have a lot of acid (like tomatoes and oranges), and coffee can make GERD symptoms worse in some people.  If your symptoms are worse after you eat a certain food, you may want to stop eating that food to see if your symptoms get better. · Do not smoke or chew tobacco. Smoking can make GERD worse. If you need help quitting, talk to your doctor about stop-smoking programs and medicines. These can increase your chances of quitting for good. · If you have GERD symptoms at night, raise the head of your bed 6 to 8 inches by putting the frame on blocks or placing a foam wedge under the head of your mattress. (Adding extra pillows does not work.) · Do not wear tight clothing around your middle. · Lose weight if you need to. Losing just 5 to 10 pounds can help. When should you call for help? Call your doctor now or seek immediate medical care if: 
? · You have new or different belly pain. ? · Your stools are black and tarlike or have streaks of blood. ? Watch closely for changes in your health, and be sure to contact your doctor if: 
? · Your symptoms have not improved after 2 days. ? · Food seems to catch in your throat or chest.  
Where can you learn more? Go to http://digna-santiago.info/. Enter O421 in the search box to learn more about \"Gastroesophageal Reflux Disease (GERD): Care Instructions. \" Current as of: May 12, 2017 Content Version: 11.4 © 2677-4123 [employer]. Care instructions adapted under license by [employer] (which disclaims liability or warranty for this information). If you have questions about a medical condition or this instruction, always ask your healthcare professional. [place] [address] any warranty or liability for your use of this information. Vaccine Information Statement Influenza (Flu) Vaccine (Inactivated or Recombinant): What you need to know Many Vaccine Information Statements are available in Lithuanian and other languages. See www.immunize.org/vis Hojas de Información Sobre Vacunas están disponibles en Español y en alina barcenas. Visite www.immunize.org/vis 1. Why get vaccinated? Influenza (flu) is a contagious disease that spreads around the [place] every year, usually between October and May. Flu is caused by influenza viruses, and is spread mainly by coughing, sneezing, and close contact. Anyone can get flu. Flu strikes suddenly and can last several days. Symptoms vary by age, but can include: 
 fever/chills  sore throat  muscle aches  fatigue  cough  headache  runny or stuffy nose Flu can also lead to pneumonia and blood infections, and cause diarrhea and seizures in children. If you have a medical condition, such as heart or lung disease, flu can make it worse. Flu is more dangerous for some people. Infants and young children, people 72years of age and older, pregnant women, and people with certain health conditions or a weakened immune system are at greatest risk. Each year thousands of people in the [facility] die from flu, and many more are hospitalized. Flu vaccine can: 
 keep you from getting flu, 
 make flu less severe if you do get it, and 
 keep you from spreading flu to your family and other people. 2. Inactivated and recombinant flu vaccines A dose of flu vaccine is recommended every flu season. Children 6 months through 6years of age may need two doses during the same flu season. Everyone else needs only one dose each flu season. Some inactivated flu vaccines contain a very small amount of a mercury-based preservative called thimerosal. Studies have not shown thimerosal in vaccines to be harmful, but flu vaccines that do not contain thimerosal are available. There is no live flu virus in flu shots. They cannot cause the flu. There are many flu viruses, and they are always changing.  Each year a new flu vaccine is made to protect against three or four viruses that are likely to cause disease in the upcoming flu season. But even when the vaccine doesnt exactly match these viruses, it may still provide some protection Flu vaccine cannot prevent: 
 flu that is caused by a virus not covered by the vaccine, or 
 illnesses that look like flu but are not. It takes about 2 weeks for protection to develop after vaccination, and protection lasts through the flu season. 3. Some people should not get this vaccine Tell the person who is giving you the vaccine:  If you have any severe, life-threatening allergies. If you ever had a life-threatening allergic reaction after a dose of flu vaccine, or have a severe allergy to any part of this vaccine, you may be advised not to get vaccinated. Most, but not all, types of flu vaccine contain a small amount of egg protein.  If you ever had Guillain-Barré Syndrome (also called GBS). Some people with a history of GBS should not get this vaccine. This should be discussed with your doctor.  If you are not feeling well. It is usually okay to get flu vaccine when you have a mild illness, but you might be asked to come back when you feel better. 4. Risks of a vaccine reaction With any medicine, including vaccines, there is a chance of reactions. These are usually mild and go away on their own, but serious reactions are also possible. Most people who get a flu shot do not have any problems with it. Minor problems following a flu shot include:  
 soreness, redness, or swelling where the shot was given  hoarseness  sore, red or itchy eyes  cough  fever  aches  headache  itching  fatigue If these problems occur, they usually begin soon after the shot and last 1 or 2 days. More serious problems following a flu shot can include the following:  There may be a small increased risk of Guillain-Barré Syndrome (GBS) after inactivated flu vaccine.   This risk has been estimated at 1 or 2 additional cases per million people vaccinated. This is much lower than the risk of severe complications from flu, which can be prevented by flu vaccine.  Young children who get the flu shot along with pneumococcal vaccine (PCV13) and/or DTaP vaccine at the same time might be slightly more likely to have a seizure caused by fever. Ask your doctor for more information. Tell your doctor if a child who is getting flu vaccine has ever had a seizure. Problems that could happen after any injected vaccine:  People sometimes faint after a medical procedure, including vaccination. Sitting or lying down for about 15 minutes can help prevent fainting, and injuries caused by a fall. Tell your doctor if you feel dizzy, or have vision changes or ringing in the ears.  Some people get severe pain in the shoulder and have difficulty moving the arm where a shot was given. This happens very rarely.  Any medication can cause a severe allergic reaction. Such reactions from a vaccine are very rare, estimated at about 1 in a million doses, and would happen within a few minutes to a few hours after the vaccination. As with any medicine, there is a very remote chance of a vaccine causing a serious injury or death. The safety of vaccines is always being monitored. For more information, visit: www.cdc.gov/vaccinesafety/ 
 
5. What if there is a serious reaction? What should I look for?  Look for anything that concerns you, such as signs of a severe allergic reaction, very high fever, or unusual behavior. Signs of a severe allergic reaction can include hives, swelling of the face and throat, difficulty breathing, a fast heartbeat, dizziness, and weakness  usually within a few minutes to a few hours after the vaccination. What should I do?  
 
 If you think it is a severe allergic reaction or other emergency that cant wait, call 9-1-1 and get the person to the nearest hospital. Otherwise, call your doctor.  Reactions should be reported to the Vaccine Adverse Event Reporting System (VAERS). Your doctor should file this report, or you can do it yourself through  the VAERS web site at www.vaers. hhs.gov, or by calling 9-410.288.1246. VAERS does not give medical advice. 6. The National Vaccine Injury Compensation Program 
 
The [facility] Vaccine Injury Compensation Program (VICP) is a federal program that was created to compensate people who may have been injured by certain vaccines. Persons who believe they may have been injured by a vaccine can learn about the program and about filing a claim by calling 0-215.251.9816 or visiting the [employer] website at www.[facility].gov/vaccinecompensation. There is a time limit to file a claim for compensation. 7. How can I learn more?  Ask your healthcare provider. He or she can give you the vaccine package insert or suggest other sources of information.  Call your local or state health department.  Contact the Centers for Disease Control and Prevention (CDC): 
- Call 9-829.550.8753 (1-800-CDC-INFO) or 
- Visit CDCs website at www.cdc.gov/flu Vaccine Information Statement Inactivated Influenza Vaccine 8/7/2015 
42 MYRA Lindo [id number] Department of Health and [employer] Centers for Disease Control and Prevention Office Use Only [facility]! Nora Escobar introduces Futurlink patient portal. Now you can access parts of your medical record, email your doctor's office, and request medication refills online. 1. In your internet browser, go to [email or web address] [employer] 2. Click on the First Time User? Click Here link in the Sign In box. You will see the New Member Sign Up page. 3. Enter your Futurlink Access Code exactly as it appears below. You will not need to use this code after youve completed the sign-up process.  If you do not sign up before the expiration date, you must request a new code. 
 
· 9SLIDES Access Code: [email or web address] Expires: 2/5/2018  1:59 PM 
 
4. Enter the last four digits of your Social Security Number (xxxx) and Date of Birth (mm/dd/yyyy) as indicated and click Submit. You will be taken to the next sign-up page. 5. Create a MoPixt ID. This will be your 9SLIDES login ID and cannot be changed, so think of one that is secure and easy to remember. 6. Create a 9SLIDES password. You can change your password at any time. 7. Enter your Password Reset Question and Answer. This can be used at a later time if you forget your password. 8. Enter your e-mail address. You will receive e-mail notification when new information is available in [address] 9. Click Sign Up. You can now view and download portions of your medical record. 10. Click the Download Summary menu link to download a portable copy of your medical information. If you have questions, please visit the Frequently Asked Questions section of the 9SLIDES website. Remember, 9SLIDES is NOT to be used for urgent needs. For medical emergencies, dial 911. Now available from your iPhone and Android! Please provide this summary of care documentation to your next provider. Your primary care clinician is listed as Sakshi Paris. If you have any questions after today's visit, please call [phone].

## 2017-11-07 NOTE — PROGRESS NOTES
Verbal order for influenza vaccine    Patient given influenza vaccine in R deltoid. Patient tolerated well. No adverse effects noted.     Lot [id number]  Exp 4/30/2018  [facility]  [address]  [phone]

## 2018-04-06 ENCOUNTER — TELEPHONE (OUTPATIENT)
Dept: FAMILY MEDICINE CLINIC | Age: 33
End: 2018-04-06

## 2018-04-06 NOTE — TELEPHONE ENCOUNTER
Called patient and left message to advise that if symptoms do not improve or worsen to be seen in ER or [facility].  Call Monday to follow-up

## 2018-04-06 NOTE — TELEPHONE ENCOUNTER
Pt called and stated she has a painful menstrual and been vomiting off and on. Please call pt at your earliest convenience.

## 2018-09-07 ENCOUNTER — HOSPITAL ENCOUNTER (EMERGENCY)
Age: 33
Discharge: HOME | End: 2018-09-07
Attending: EMERGENCY MEDICINE
Payer: COMMERCIAL

## 2018-09-07 ENCOUNTER — OFFICE VISIT (OUTPATIENT)
Dept: FAMILY MEDICINE CLINIC | Age: 33
End: 2018-09-07

## 2018-09-07 ENCOUNTER — HOSPITAL ENCOUNTER (OUTPATIENT)
Dept: GENERAL RADIOLOGY | Age: 33
Discharge: HOME | End: 2018-09-07
Payer: COMMERCIAL

## 2018-09-07 VITALS
HEART RATE: 67 BPM | RESPIRATION RATE: 16 BRPM | OXYGEN SATURATION: 100 % | WEIGHT: 135 LBS | DIASTOLIC BLOOD PRESSURE: 76 MMHG | TEMPERATURE: 98.9 F | HEIGHT: 66 IN | SYSTOLIC BLOOD PRESSURE: 125 MMHG | BODY MASS INDEX: 21.69 KG/M2

## 2018-09-07 VITALS
HEIGHT: 66 IN | RESPIRATION RATE: 16 BRPM | BODY MASS INDEX: 21.76 KG/M2 | HEART RATE: 67 BPM | OXYGEN SATURATION: 99 % | SYSTOLIC BLOOD PRESSURE: 116 MMHG | WEIGHT: 135.4 LBS | DIASTOLIC BLOOD PRESSURE: 72 MMHG | TEMPERATURE: 98.5 F

## 2018-09-07 DIAGNOSIS — R91.8 INFILTRATE OF LUNG PRESENT ON IMAGING OF CHEST: ICD-10-CM

## 2018-09-07 DIAGNOSIS — D50.8 OTHER IRON DEFICIENCY ANEMIA: ICD-10-CM

## 2018-09-07 DIAGNOSIS — A04.72 CLOSTRIDIUM DIFFICILE DIARRHEA: ICD-10-CM

## 2018-09-07 DIAGNOSIS — R91.8 INFILTRATE OF LUNG PRESENT ON IMAGING OF CHEST: Primary | ICD-10-CM

## 2018-09-07 DIAGNOSIS — R11.0 NAUSEA: ICD-10-CM

## 2018-09-07 DIAGNOSIS — R93.89 ABNORMAL CHEST X-RAY: Primary | ICD-10-CM

## 2018-09-07 PROCEDURE — 71046 X-RAY EXAM CHEST 2 VIEWS: CPT

## 2018-09-07 PROCEDURE — 99281 EMR DPT VST MAYX REQ PHY/QHP: CPT

## 2018-09-07 RX ORDER — IBUPROFEN 200 MG
200 TABLET ORAL
COMMUNITY

## 2018-09-07 RX ORDER — METRONIDAZOLE 500 MG/1
500 TABLET ORAL
COMMUNITY
Start: 2018-08-23 | End: 2018-09-12

## 2018-09-07 RX ORDER — ONDANSETRON 4 MG/1
4 TABLET, ORALLY DISINTEGRATING ORAL
COMMUNITY
Start: 2018-08-23

## 2018-09-07 RX ORDER — PROCHLORPERAZINE MALEATE 5 MG
5 TABLET ORAL
Qty: 20 TAB | Refills: 0 | Status: SHIPPED | OUTPATIENT
Start: 2018-09-07 | End: 2018-09-14

## 2018-09-07 RX ORDER — LANOLIN ALCOHOL/MO/W.PET/CERES
CREAM (GRAM) TOPICAL
Refills: 0 | COMMUNITY
Start: 2018-08-23 | End: 2019-08-12 | Stop reason: SDUPTHER

## 2018-09-07 NOTE — MR AVS SNAPSHOT
[address] [place] [address] 
[phone] Patient: Yuli Daniel MRN: [id number] CHC:[id number] Visit Information Date & Time Provider Department Dept. Phone Encounter #  
 9/7/2018 11:45 AM Joaquín Murillo, [address] [id number] Follow-up Instructions Return in about 1 month (around 10/7/2018). Upcoming Health Maintenance Date Due Influenza Age 5 to Adult 8/1/2018 PAP AKA CERVICAL CYTOLOGY 6/8/2020 DTaP/Tdap/Td series (2 - Td) 11/7/2027 Allergies as of 9/7/2018  Review Complete On: 9/7/2018 By: Joaquín Murillo MD  
 No Known Allergies Current Immunizations  Never Reviewed Name Date Influenza Vaccine 11/1/2016 Influenza Vaccine (Quad) PF 11/7/2017 Not reviewed this visit You Were Diagnosed With   
  
 Codes Comments Infiltrate of lung present on imaging of chest    -  Primary ICD-10-CM: R91.8 ICD-9-CM: 793.19 Clostridium difficile diarrhea     ICD-10-CM: A04.72 
ICD-9-CM: 008.45 Other iron deficiency anemia     ICD-10-CM: D50.8 ICD-9-CM: 280.8 Nausea     ICD-10-CM: R11.0 ICD-9-CM: 787.02 Vitals BP Pulse Temp Resp Height(growth percentile) Weight(growth percentile) 116/72 (BP 1 Location: Right arm, BP Patient Position: Sitting) 67 98.5 °F (36.9 °C) (Oral) 16 5' 5.5\" (1.664 m) 135 lb 6.4 oz (61.4 kg) LMP SpO2 BMI Smoking Status 08/13/2018 99% 22.19 kg/m2 Never Smoker Vitals History BMI and BSA Data Body Mass Index Body Surface Area  
 22.19 kg/m 2 1.68 m 2 Preferred Pharmacy Pharmacy Name Phone [facility], [address] [facility] [phone] Your Updated Medication List  
  
   
This list is accurate as of 9/7/18 12:08 PM.  Always use your most recent med list.  
  
  
  
  
 ferrous sulfate 325 mg (65 mg iron) tablet  
take 1 tablet by mouth twice a day  
  
 ibuprofen 200 mg tablet Commonly known as:  MOTRIN  
200 mg.  
  
 metroNIDAZOLE 500 mg tablet Commonly known as:  FLAGYL  
500 mg.  
  
 ondansetron 4 mg disintegrating tablet Commonly known as:  ZOFRAN ODT  
4 mg. prochlorperazine 5 mg tablet Commonly known as:  COMPAZINE Take 1 Tab by mouth every eight (8) hours as needed for Nausea for up to 7 days. Prescriptions Sent to Pharmacy Refills  
 prochlorperazine (COMPAZINE) 5 mg tablet 0 Sig: Take 1 Tab by mouth every eight (8) hours as needed for Nausea for up to 7 days. Class: Normal  
 Pharmacy: [id number] [address] [facility], [address] [place] [address] #: [phone] Route: Oral  
  
Follow-up Instructions Return in about 1 month (around 10/7/2018). To-Do List   
 09/07/2018 Lab:  CBC W/O DIFF   
  
 09/07/2018 Lab:  METABOLIC PANEL, COMPREHENSIVE   
  
 09/07/2018 Imaging:  XR CHEST PA LAT Patient Instructions Iron-Rich Diet: Care Instructions Your Care Instructions Your body needs iron to make hemoglobin. Hemoglobin is a substance in red blood cells that carries oxygen from the lungs to cells all through your body. If you do not get enough iron, your body makes fewer and smaller red blood cells. As a result, your body's cells may not get enough oxygen. Adult men need 8 milligrams of iron a day; adult women need 18 milligrams of iron a day. After menopause, women need 8 milligrams of iron a day. A pregnant woman needs 27 milligrams of iron a day. Infants and young children have higher iron needs relative to their size than other age groups. People who have lost blood because of ulcers or heavy menstrual periods may become very low in iron and may develop anemia.  
Most people can get the iron their bodies need by eating enough of certain iron-rich foods. Your doctor may recommend that you take an iron supplement along with eating an iron-rich diet. Follow-up care is a key part of your treatment and safety. Be sure to make and go to all appointments, and call your doctor if you are having problems. It's also a good idea to know your test results and keep a list of the medicines you take. How can you care for yourself at home? · Make iron-rich foods a part of your daily diet. Iron-rich foods include: ¨ All meats, such as chicken, beef, lamb, pork, fish, and shellfish. Liver is especially high in iron. ¨ Leafy green vegetables. ¨ Raisins, peas, beans, lentils, barley, and eggs. ¨ Iron-fortified breakfast cereals. · Eat foods with vitamin C along with iron-rich foods. Vitamin C helps you absorb more iron from food. Drink a glass of orange juice or another citrus juice with your food. · Eat meat and vegetables or grains together. The iron in meat helps your body absorb the iron in other foods. Where can you learn more? Go to http://digna-santiago.info/. Enter [id number] in the search box to learn more about \"Iron-Rich Diet: Care Instructions. \" Current as of: May 12, 2017 Content Version: 11.7 © 4136-7495 [employer]. Care instructions adapted under license by [employer] (which disclaims liability or warranty for this information). If you have questions about a medical condition or this instruction, always ask your healthcare professional. [place] [address] any warranty or liability for your use of this information. Clostridium Difficile Colitis: Care Instructions Your Care Instructions Clostridium difficile (also called C. difficile) are bacteria that can cause swelling and irritation of the large intestine, or colon. This inflammation is also called colitis. It can cause diarrhea, fever, and belly cramps. You may get C. difficile colitis if you take antibiotics. The infection is most common in people who are taking antibiotics while in the hospital. It is also common in older people in hospitals and nursing homes. Severe disease could cause the colon to swell to many times its normal size (toxic megacolon). This can cause death and needs emergency treatment. You may have a swollen belly that is painful or tender, a rapid heartbeat, and a fever. Follow-up care is a key part of your treatment and safety. Be sure to make and go to all appointments, and call your doctor if you are having problems. It's also a good idea to know your test results and keep a list of the medicines you take. How can you care for yourself at home? · Your doctor may give you antibiotics to treat C. difficile colitis. If your doctor prescribes an antibiotic, he or she will give you a different antibiotic than the one that caused your infection. Take your antibiotics as directed. Do not stop taking them just because you feel better. You need to take the full course of antibiotics. · To prevent dehydration, drink plenty of fluids, enough so that your urine is light yellow or clear like water. Choose water and other caffeine-free clear liquids until you feel better. If you have kidney, heart, or liver disease and have to limit fluids, talk with your doctor before you increase the amount of fluids you drink. · Begin eating small amounts of mild foods, if you feel like it. Try yogurt that has live cultures of lactobacillus (check the label). ¨ Avoid spicy foods, fruits, alcohol, and caffeine until 48 hours after all symptoms go away. ¨ Avoid chewing gum that contains sorbitol. ¨ Avoid dairy products (except for yogurt with lactobacillus) while you have diarrhea and for 3 days after symptoms go away. · To prevent the spread of C. difficile, practice good hygiene.  Keep your hands clean by washing them well and often with soap and clean, running water. Alcohol-based hand sanitizers do not kill C. difficile. When should you call for help? Call 911 if: 
  · You passed out (lost consciousness).  
 Call your doctor now or seek immediate medical care if: 
  · You have a fever over 101°F or shaking chills.  
  · You feel lightheaded or have a fast heart rate.  
  · You pass stools that are almost always bloody.  
  · You have signs of needing more fluids. You have sunken eyes and a dry mouth, and you pass only a little dark urine.  
  · You have severe belly pain with or without bloating.  
  · You have severe vomiting and cannot keep down liquids.  
  · You are not passing any stools or gas.  
 Watch closely for changes in your health, and be sure to contact your doctor if: 
  · You do not get better as expected. Where can you learn more? Go to http://digna-santiago.info/. Enter [phone] in the search box to learn more about \"Clostridium Difficile Colitis: Care Instructions. \" Current as of: November 18, 2017 Content Version: 11.7 © 7782-3686 Simple-Fill. Care instructions adapted under license by [employer] (which disclaims liability or warranty for this information). If you have questions about a medical condition or this instruction, always ask your healthcare professional. [address] any warranty or liability for your use of this information. Pneumonia: Care Instructions Your Care Instructions Pneumonia is an infection of the lungs. Most cases are caused by infections from bacteria or viruses. Pneumonia may be mild or very severe. If it is caused by bacteria, you will be treated with antibiotics. It may take a few weeks to a few months to recover fully from pneumonia, depending on how sick you were and whether your overall health is good. Follow-up care is a key part of your treatment and safety. Be sure to make and go to all appointments, and call your doctor if you are having problems. It's also a good idea to know your test results and keep a list of the medicines you take. How can you care for yourself at home? · Take your antibiotics exactly as directed. Do not stop taking the medicine just because you are feeling better. You need to take the full course of antibiotics. · Take your medicines exactly as prescribed. Call your doctor if you think you are having a problem with your medicine. · Get plenty of rest and sleep. You may feel weak and tired for a while, but your energy level will improve with time. · To prevent dehydration, drink plenty of fluids, enough so that your urine is light yellow or clear like water. Choose water and other caffeine-free clear liquids until you feel better. If you have kidney, heart, or liver disease and have to limit fluids, talk with your doctor before you increase the amount of fluids you drink. · Take care of your cough so you can rest. A cough that brings up mucus from your lungs is common with pneumonia. It is one way your body gets rid of the infection. But if coughing keeps you from resting or causes severe fatigue and chest-wall pain, talk to your doctor. He or she may suggest that you take a medicine to reduce the cough. · Use a vaporizer or humidifier to add moisture to your bedroom. Follow the directions for cleaning the machine. · Do not smoke or allow others to smoke around you. Smoke will make your cough last longer. If you need help quitting, talk to your doctor about stop-smoking programs and medicines. These can increase your chances of quitting for good. · Take an over-the-counter pain medicine, such as acetaminophen (Tylenol), ibuprofen (Advil, Motrin), or naproxen (Aleve). Read and follow all instructions on the label. · Do not take two or more pain medicines at the same time unless the doctor told you to. Many pain medicines have acetaminophen, which is Tylenol. Too much acetaminophen (Tylenol) can be harmful. · If you were given a spirometer to measure how well your lungs are working, use it as instructed. This can help your doctor tell how your recovery is going. · To prevent pneumonia in the future, talk to your doctor about getting a flu vaccine (once a year) and a pneumococcal vaccine (one time only for most people). When should you call for help? Call 911 anytime you think you may need emergency care. For example, call if: 
  · You have severe trouble breathing.  
 Call your doctor now or seek immediate medical care if: 
  · You cough up dark brown or bloody mucus (sputum).  
  · You have new or worse trouble breathing.  
  · You are dizzy or lightheaded, or you feel like you may faint.  
 Watch closely for changes in your health, and be sure to contact your doctor if: 
  · You have a new or higher fever.  
  · You are coughing more deeply or more often.  
  · You are not getting better after 2 days (48 hours).  
  · You do not get better as expected. Where can you learn more? Go to http://digna-santiago.info/. Enter [id number] in the search box to learn more about \"Pneumonia: Care Instructions. \" Current as of: December 6, 2017 Content Version: 11.7 © 7146-9862 Healthwise[employer] Incorporated. Care instructions adapted under license by [employer] (which disclaims liability or warranty for this information). If you have questions about a medical condition or this instruction, always ask your healthcare professional. [address] any warranty or liability for your use of this information. Introducing [facility]!    
 Yanet Jacobs introduces SingOn patient portal. Now you can access parts of your medical record, email your doctor's office, and request medication refills online. 1. In your internet browser, go to [email or web address] 2. Click on the First Time User? Click Here link in the Sign In box. You will see the New Member Sign Up page. 3. Enter your Labochema Access Code exactly as it appears below. You will not need to use this code after youve completed the sign-up process. If you do not sign up before the expiration date, you must request a new code. · Labochema Access Code: [email or web address] Expires: 12/6/2018 12:08 PM 
 
4. Enter the last four digits of your Social Security Number (xxxx) and Date of Birth (mm/dd/yyyy) as indicated and click Submit. You will be taken to the next sign-up page. 5. Create a Labochema ID. This will be your Labochema login ID and cannot be changed, so think of one that is secure and easy to remember. 6. Create a Labochema password. You can change your password at any time. 7. Enter your Password Reset Question and Answer. This can be used at a later time if you forget your password. 8. Enter your e-mail address. You will receive e-mail notification when new information is available in [address] 9. Click Sign Up. You can now view and download portions of your medical record. 10. Click the Download Summary menu link to download a portable copy of your medical information. If you have questions, please visit the Frequently Asked Questions section of the Labochema website. Remember, Labochema is NOT to be used for urgent needs. For medical emergencies, dial 911. Now available from your iPhone and Android! Please provide this summary of care documentation to your next provider. Your primary care clinician is listed as Jose Robertson. If you have any questions after today's visit, please call [phone].

## 2018-09-07 NOTE — PROGRESS NOTES
HISTORY OF PRESENT ILLNESS Alonso Harkins [id number] Bk Isabel is a 28 y.o. female. HPI; here for follow up after hospital discharge . Summery of hospital course as below. The patient was admitted to the hospital on 8/16/18 for fevers, headache and worsening pelvic pain. She had previously undergone a robotic assisted lysis of adhesions, myomectomy, and bilateral salpingectomy on 7/12/18 by Dr. Mirian Becker for a history of endometriosis and infertility. Her initial postoperative course was uncomplicated until about 4-5 weeks postop, when she developed fevers and headache. She presented to the [facility] ED where she was found to be anemic (6.6/21.1), and hypotensive with CT findings concerning for bilateral adnexal cystic areas. There was no clinical evidence of meningitis. She was given IV hydration, and started on empiric antibiotics (Zosyn on 8/16) for concern of sepsis. She was subsequently transferred to [facility] for continued care with our service. Upon admission, she was transfused 2u PRBCs with appropriate improvement in H/H. She continued to have high fevers with development of a cough and chest x-ray was suggestive of developing pneumonia. Pelvic pain remained essentially stable throughout admission. A subsequent CTA was negative for pulmonary embolism but confirmed bilateral pneumonia. Her antibiotic coverage was broadened with Vancomycin and respiratory care was initiated. The patient subsequently developed diarrhea, which was positive for a C. Difficile infection. Her pulmonary symptoms continued to improve throughout admission and she was weaned off supplemental O2. She continued on treatment for C. Diff with overall improvement of diarrhea, although not completely resolved. She had been afebrile >72 hours at time of discharge and antibiotics were transitioned to PO at discharge.  Blood counts remained essentially stable with some improvement by time of discharge. By hospital day #7, it is felt she has reached the maximal benefit of inpatient hospitalization and is now deemed stable for discharge Currently she is feeling gradually better. No cough or chest congestion. No sob. No chest pain or palpitation. No headache or dizziness. No urinary complains. Sitting comfortable. Also still on flagyl for c.diff. Stool is forming but still soft. No blood in stool. No abdominal pain. Having on and off nausea. Appetite is low but gradually improving. No more fever. Had period post surgery was heavy and still not had second period. Has appt with gyn surgeon. No pelvic pain at this time. Visit Vitals  /72 (BP 1 Location: Right arm, BP Patient Position: Sitting)  Pulse 67  Temp 98.5 °F (36.9 °C) (Oral)  Resp 16  
 Ht 5' 5.5\" (1.664 m)  Wt 135 lb 6.4 oz (61.4 kg)  SpO2 99%  BMI 22.19 kg/m2 Review medication list, vitals, problem list,allergies. Review ER records and labs from care everywhere. 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 Also noted anemia. Had blood transfusion. Now on oral iron supplement. Tolerating it well. Pre transfusion H & H was 6.6/21.1 and post transfusion upon discharge H & H was 9.1/28. 1. ROS: see HPI Physical Exam  
Constitutional: She is oriented to person, place, and time. No distress. Cardiovascular: Normal rate, regular rhythm and normal heart sounds. Pulmonary/Chest: CTA Abdominal: Soft. Bowel sounds are normal. There is no tenderness. Musculoskeletal: She exhibits no edema. Neurological: She is oriented to person, place, and time. Psychiatric: Her behavior is normal.  
 
 
ASSESSMENT and PLAN 
  ICD-10-CM ICD-9-CM 1. Infiltrate of lung present on imaging of chest: done with levaquin. No side effects. For now repeat chest x-ray. If it is clear and she has no fever and stool is forming she can be clear for work. R91.8 793.19 XR CHEST PA LAT 2. Clostridium difficile diarrhea: improving. No fever. No abdominal pain. No blood in stool. Stool is forming and no more watery. Advise to complete flagyl as recommended. A04.72 008.45 CBC W/O DIFF  
   METABOLIC PANEL, COMPREHENSIVE 3. Other iron deficiency anemia: continue iron supplement and repeat labs before next visit. Also advised iron rich diet. D50.8 280.8 CBC W/O DIFF 4. Nausea: symptomatic treatment. R11.0 787.02 prochlorperazine (COMPAZINE) 5 mg tablet Post surgery. Has an appt with gyn . Pt understood and agree with the plan Review  Follow-up Disposition: 
Return in about 1 month (around 10/7/2018).

## 2018-09-07 NOTE — LETTER
NOTIFICATION RETURN TO WORK / SCHOOL 
 
9/7/2018 4:06 PM 
 
Ms. Erica Root [address] [facility],[address] [place] [id number] To Whom It May Concern: 
 
Erica Root is currently under the care of [facility] EMERGENCY DEPT. She will return to work/school on: 9/8/18 If there are questions or concerns please have the patient contact our office.  
 
 
 
Sincerely, 
 
 
TYESHA Amaya

## 2018-09-07 NOTE — PROGRESS NOTES
1. Have you been to the ER, urgent care clinic since your last visit? Hospitalized since your last visit? [facility] ED 8/16/18 and 7/2018 surgery with Dr. Zuleyka Marlow. 2. Have you seen or consulted any other health care providers outside of the [facility] since your last visit? Include any pap smears or colon screening. [facility], Dr. Jero Winkler: 6/2018 Patient has not had flu vaccine.

## 2018-09-07 NOTE — PATIENT INSTRUCTIONS
Iron-Rich Diet: Care Instructions Your Care Instructions Your body needs iron to make hemoglobin. Hemoglobin is a substance in red blood cells that carries oxygen from the lungs to cells all through your body. If you do not get enough iron, your body makes fewer and smaller red blood cells. As a result, your body's cells may not get enough oxygen. Adult men need 8 milligrams of iron a day; adult women need 18 milligrams of iron a day. After menopause, women need 8 milligrams of iron a day. A pregnant woman needs 27 milligrams of iron a day. Infants and young children have higher iron needs relative to their size than other age groups. People who have lost blood because of ulcers or heavy menstrual periods may become very low in iron and may develop anemia. Most people can get the iron their bodies need by eating enough of certain iron-rich foods. Your doctor may recommend that you take an iron supplement along with eating an iron-rich diet. Follow-up care is a key part of your treatment and safety. Be sure to make and go to all appointments, and call your doctor if you are having problems. It's also a good idea to know your test results and keep a list of the medicines you take. How can you care for yourself at home? · Make iron-rich foods a part of your daily diet. Iron-rich foods include: ¨ All meats, such as chicken, beef, lamb, pork, fish, and shellfish. Liver is especially high in iron. ¨ Leafy green vegetables. ¨ Raisins, peas, beans, lentils, barley, and eggs. ¨ Iron-fortified breakfast cereals. · Eat foods with vitamin C along with iron-rich foods. Vitamin C helps you absorb more iron from food. Drink a glass of orange juice or another citrus juice with your food. · Eat meat and vegetables or grains together. The iron in meat helps your body absorb the iron in other foods. Where can you learn more? Go to http://digna-santiago.info/. Enter [id number] in the search box to learn more about \"Iron-Rich Diet: Care Instructions. \" Current as of: May 12, 2017 Content Version: 11.7 © 1600-4969 [employer]. Care instructions adapted under license by [employer] (which disclaims liability or warranty for this information). If you have questions about a medical condition or this instruction, always ask your healthcare professional. [place] [address] any warranty or liability for your use of this information. Clostridium Difficile Colitis: Care Instructions Your Care Instructions Clostridium difficile (also called C. difficile) are bacteria that can cause swelling and irritation of the large intestine, or colon. This inflammation is also called colitis. It can cause diarrhea, fever, and belly cramps. You may get C. difficile colitis if you take antibiotics. The infection is most common in people who are taking antibiotics while in the hospital. It is also common in older people in hospitals and nursing homes. Severe disease could cause the colon to swell to many times its normal size (toxic megacolon). This can cause death and needs emergency treatment. You may have a swollen belly that is painful or tender, a rapid heartbeat, and a fever. Follow-up care is a key part of your treatment and safety. Be sure to make and go to all appointments, and call your doctor if you are having problems. It's also a good idea to know your test results and keep a list of the medicines you take. How can you care for yourself at home? · Your doctor may give you antibiotics to treat C. difficile colitis. If your doctor prescribes an antibiotic, he or she will give you a different antibiotic than the one that caused your infection. Take your antibiotics as directed. Do not stop taking them just because you feel better. You need to take the full course of antibiotics. · To prevent dehydration, drink plenty of fluids, enough so that your urine is light yellow or clear like water. Choose water and other caffeine-free clear liquids until you feel better. If you have kidney, heart, or liver disease and have to limit fluids, talk with your doctor before you increase the amount of fluids you drink. · Begin eating small amounts of mild foods, if you feel like it. Try yogurt that has live cultures of lactobacillus (check the label). ¨ Avoid spicy foods, fruits, alcohol, and caffeine until 48 hours after all symptoms go away. ¨ Avoid chewing gum that contains sorbitol. ¨ Avoid dairy products (except for yogurt with lactobacillus) while you have diarrhea and for 3 days after symptoms go away. · To prevent the spread of C. difficile, practice good hygiene. Keep your hands clean by washing them well and often with soap and clean, running water. Alcohol-based hand sanitizers do not kill C. difficile. When should you call for help? Call 911 if: 
  · You passed out (lost consciousness).  
 Call your doctor now or seek immediate medical care if: 
  · You have a fever over 101°F or shaking chills.  
  · You feel lightheaded or have a fast heart rate.  
  · You pass stools that are almost always bloody.  
  · You have signs of needing more fluids. You have sunken eyes and a dry mouth, and you pass only a little dark urine.  
  · You have severe belly pain with or without bloating.  
  · You have severe vomiting and cannot keep down liquids.  
  · You are not passing any stools or gas.  
 Watch closely for changes in your health, and be sure to contact your doctor if: 
  · You do not get better as expected. Where can you learn more? Go to http://digna-santiago.info/. Enter [phone] [id number] in the search box to learn more about \"Clostridium Difficile Colitis: Care Instructions. \" Current as of: November 18, 2017 Content Version: 11.7 © 7935-9345 [employer]. Care instructions adapted under license by [employer] (which disclaims liability or warranty for this information). If you have questions about a medical condition or this instruction, always ask your healthcare professional. [address] any warranty or liability for your use of this information. Pneumonia: Care Instructions Your Care Instructions Pneumonia is an infection of the lungs. Most cases are caused by infections from bacteria or viruses. Pneumonia may be mild or very severe. If it is caused by bacteria, you will be treated with antibiotics. It may take a few weeks to a few months to recover fully from pneumonia, depending on how sick you were and whether your overall health is good. Follow-up care is a key part of your treatment and safety. Be sure to make and go to all appointments, and call your doctor if you are having problems. It's also a good idea to know your test results and keep a list of the medicines you take. How can you care for yourself at home? · Take your antibiotics exactly as directed. Do not stop taking the medicine just because you are feeling better. You need to take the full course of antibiotics. · Take your medicines exactly as prescribed. Call your doctor if you think you are having a problem with your medicine. · Get plenty of rest and sleep. You may feel weak and tired for a while, but your energy level will improve with time. · To prevent dehydration, drink plenty of fluids, enough so that your urine is light yellow or clear like water. Choose water and other caffeine-free clear liquids until you feel better. If you have kidney, heart, or liver disease and have to limit fluids, talk with your doctor before you increase the amount of fluids you drink.  
· Take care of your cough so you can rest. A cough that brings up mucus from your lungs is common with pneumonia. It is one way your body gets rid of the infection. But if coughing keeps you from resting or causes severe fatigue and chest-wall pain, talk to your doctor. He or she may suggest that you take a medicine to reduce the cough. · Use a vaporizer or humidifier to add moisture to your bedroom. Follow the directions for cleaning the machine. · Do not smoke or allow others to smoke around you. Smoke will make your cough last longer. If you need help quitting, talk to your doctor about stop-smoking programs and medicines. These can increase your chances of quitting for good. · Take an over-the-counter pain medicine, such as acetaminophen (Tylenol), ibuprofen (Advil, Motrin), or naproxen (Aleve). Read and follow all instructions on the label. · Do not take two or more pain medicines at the same time unless the doctor told you to. Many pain medicines have acetaminophen, which is Tylenol. Too much acetaminophen (Tylenol) can be harmful. · If you were given a spirometer to measure how well your lungs are working, use it as instructed. This can help your doctor tell how your recovery is going. · To prevent pneumonia in the future, talk to your doctor about getting a flu vaccine (once a year) and a pneumococcal vaccine (one time only for most people). When should you call for help? Call 911 anytime you think you may need emergency care. For example, call if: 
  · You have severe trouble breathing.  
 Call your doctor now or seek immediate medical care if: 
  · You cough up dark brown or bloody mucus (sputum).  
  · You have new or worse trouble breathing.  
  · You are dizzy or lightheaded, or you feel like you may faint.  
 Watch closely for changes in your health, and be sure to contact your doctor if: 
  · You have a new or higher fever.  
  · You are coughing more deeply or more often.  
  · You are not getting better after 2 days (48 hours).   · You do not get better as expected. Where can you learn more? Go to http://digna-santiago.info/. Enter [id number] in the search box to learn more about \"Pneumonia: Care Instructions. \" Current as of: December 6, 2017 Content Version: 11.7 © 4770-0873 "1,2,3 [employer]". Care instructions adapted under license by [employer] (which disclaims liability or warranty for this information). If you have questions about a medical condition or this instruction, always ask your healthcare professional. [address] any warranty or liability for your use of this information.

## 2018-09-07 NOTE — LETTER
NOTIFICATION RETURN TO WORK / SCHOOL 
 
9/7/2018 3:57 PM 
 
Ms. Erica Daniel [address] [facility],[address] [place] [id number] To Whom It May Concern: 
 
Erica Daniel is currently under the care of [id number] [facility] EMERGENCY DEPT. She will return to work/school on: 9/8/18 If there are questions or concerns please have the patient contact our office.  
 
 
 
Sincerely, 
 
 
TYESHA Anderson

## 2018-09-07 NOTE — PROGRESS NOTES
Contacted patient and verified identity using name and date of birth (2- identifiers)  Spoke with patient and she verbalized understanding of need to be seen in ER immediately for potential foreign object found on CXR. Patient denies and chest surgery or tattoos on chest or back. Patient states she will come here to [facility] for treatment. Dr. Lay Better aware.

## 2018-09-07 NOTE — PROGRESS NOTES
Let pt know that chest x-ray showed ? Foreign body in the lung. Please advise pt to go to Er. Thanks.

## 2018-09-07 NOTE — ED PROVIDER NOTES
EMERGENCY DEPARTMENT HISTORY AND PHYSICAL EXAM 
 
Date: 9/7/2018 Patient Name: Angy Briggs History of Presenting Illness Chief Complaint Patient presents with  Abnormal Chest X Ray History Provided By: Patient Chief Complaint: abnormal xray Duration: 1 Days Timing: Location: 
Quality:  
Severity:  
Modifying Factors:  
Associated Symptoms: denies any other associated signs or symptoms Additional History (Context): Melecio Daniel is a 28 y.o. female with No significant past medical history who presents with complaint of abnormal xray. Pt states she was called by pcp and told to come to ED. Pt states she has no pain at this time and sx have resolved. No other complaints at this time PCP: Petrona Michael MD 
 
Current Outpatient Prescriptions Medication Sig Dispense Refill  ibuprofen (MOTRIN) 200 mg tablet 200 mg.  ferrous sulfate 325 mg (65 mg iron) tablet take 1 tablet by mouth twice a day  0  
 metroNIDAZOLE (FLAGYL) 500 mg tablet 500 mg.    
 ondansetron (ZOFRAN ODT) 4 mg disintegrating tablet 4 mg.  prochlorperazine (COMPAZINE) 5 mg tablet Take 1 Tab by mouth every eight (8) hours as needed for Nausea for up to 7 days. 20 Tab 0 Past History Past Medical History: No past medical history on file. Past Surgical History: 
Past Surgical History:  
Procedure Laterality Date  HX OTHER SURGICAL    
 ovarian cyst on right ovary - 8/2010 Family History: 
Family History Problem Relation Age of Onset  Substance Abuse Mother   
  tobacco use  Hypertension Mother  Heart Failure Mother  Kidney Disease Mother Social History: 
Social History Substance Use Topics  Smoking status: Never Smoker  Smokeless tobacco: Never Used  Alcohol use Yes Comment: occ Allergies: 
No Known Allergies Review of Systems Review of Systems Constitutional: Negative for fever. HENT: Negative for congestion. Respiratory: Negative for cough and shortness of breath. Cardiovascular: Negative for chest pain. Gastrointestinal: Negative for abdominal pain, nausea and vomiting. Genitourinary: Negative for dysuria. Skin: Negative for wound. Neurological: Negative for dizziness and headaches. All Other Systems Negative Physical Exam  
 
Vitals:  
 09/07/18 1501 BP: 125/76 Pulse: 67 Resp: 16 Temp: 98.9 °F (37.2 °C) SpO2: 100% Weight: 61.2 kg (135 lb) Height: 5' 5.5\" (1.664 m) Physical Exam  
Constitutional: She is oriented to person, place, and time. She appears well-developed and well-nourished. No distress. HENT:  
Head: Normocephalic and atraumatic. Nose: Nose normal.  
Eyes: Conjunctivae are normal. Pupils are equal, round, and reactive to light. Cardiovascular: Normal rate, regular rhythm and normal heart sounds. Pulmonary/Chest: Effort normal and breath sounds normal. No respiratory distress. She has no wheezes. Chest wall is not dull to percussion. She exhibits no mass, no tenderness, no bony tenderness, no laceration, no crepitus, no edema, no deformity, no swelling and no retraction. Musculoskeletal: Normal range of motion. Neurological: She is alert and oriented to person, place, and time. Skin: Skin is warm and dry. Psychiatric: She has a normal mood and affect. Her behavior is normal.  
Vitals reviewed. Diagnostic Study Results Labs - No results found for this or any previous visit (from the past 12 hour(s)). Radiologic Studies - No orders to display CT Results  (Last 48 hours) None CXR Results  (Last 48 hours) 09/07/18 1242  XR CHEST PA LAT Final result Impression:  IMPRESSION:  
   
Possible artifact vs. radiopaque linear metallic foreign body at the right apex  
suggestive of a needle.   
  
 Narrative:  EXAM: CHEST PA AND LATERAL  
   
 CLINICAL HISTORY/INDICATION:  f/u on pneumonia, abnormal chest x-ray COMPARISON: None provided. TECHNIQUE: PA and lateral views FINDINGS:   
   
 The cardiac and mediastinal silhouette is normal.  The lungs are clear. The  
costophrenic angles are sharply defined. Pulmonary vascularity is normal. There  
is a thin linear radiopacity projecting over the right medial lung apex  
measuring 4 cm in length seen only on the AP view. Medical Decision Making I am the first provider for this patient. I reviewed the vital signs, available nursing notes, past medical history, past surgical history, family history and social history. Vital Signs-Reviewed the patient's vital signs. Pulse Oximetry Analysis - 100% on RA Records Reviewed: Old Medical Records Procedures: 
Procedures Provider Notes (Medical Decision Making):  
 
Pt is sent to ed by pcp because of abnormal xray, reading states metallic foreign body v artifact. Pt admits to recent hospitalization for pneumonia but state no IV or injections in specified area, pt has no sx or TTP. She denies IVDU. Favor artifact over FB Will discharge home for repeat xray 2 weeks MED RECONCILIATION: 
No current facility-administered medications for this encounter. Current Outpatient Prescriptions Medication Sig  ibuprofen (MOTRIN) 200 mg tablet 200 mg.  ferrous sulfate 325 mg (65 mg iron) tablet take 1 tablet by mouth twice a day  metroNIDAZOLE (FLAGYL) 500 mg tablet 500 mg.  
 ondansetron (ZOFRAN ODT) 4 mg disintegrating tablet 4 mg.  prochlorperazine (COMPAZINE) 5 mg tablet Take 1 Tab by mouth every eight (8) hours as needed for Nausea for up to 7 days. Disposition: 
discharge DISCHARGE NOTE:  
 
Pt has been reexamined. Patient has no new complaints, changes, or physical findings. Care plan outlined and precautions discussed.   Results of visit were reviewed with the patient. All medications were reviewed with the patient; will d/c home with instructions for follow up. All of pt's questions and concerns were addressed. Patient was instructed and agrees to follow up with PCP, as well as to return to the ED upon further deterioration. Patient is ready to go home. Follow-up Information Follow up With Details Comments Contact Info Diego Lee MD Call As needed, follow up [address] 
[phone] [address] [facility] EMERGENCY DEPT  If symptoms worsen [place] [address] [address] [address] [phone] Current Discharge Medication List  
  
 
 
 
Diagnosis Clinical Impression: 1. Abnormal chest x-ray

## 2018-10-09 ENCOUNTER — OFFICE VISIT (OUTPATIENT)
Dept: FAMILY MEDICINE CLINIC | Age: 33
End: 2018-10-09

## 2018-10-09 ENCOUNTER — HOSPITAL ENCOUNTER (OUTPATIENT)
Dept: GENERAL RADIOLOGY | Age: 33
Discharge: HOME | End: 2018-10-09
Payer: COMMERCIAL

## 2018-10-09 ENCOUNTER — HOSPITAL ENCOUNTER (OUTPATIENT)
Dept: LAB | Age: 33
Discharge: HOME | End: 2018-10-09
Payer: COMMERCIAL

## 2018-10-09 VITALS
TEMPERATURE: 98.4 F | OXYGEN SATURATION: 100 % | WEIGHT: 134.8 LBS | BODY MASS INDEX: 21.66 KG/M2 | HEIGHT: 66 IN | DIASTOLIC BLOOD PRESSURE: 78 MMHG | RESPIRATION RATE: 16 BRPM | SYSTOLIC BLOOD PRESSURE: 110 MMHG | HEART RATE: 72 BPM

## 2018-10-09 DIAGNOSIS — D50.8 OTHER IRON DEFICIENCY ANEMIA: ICD-10-CM

## 2018-10-09 DIAGNOSIS — R11.0 NAUSEA: ICD-10-CM

## 2018-10-09 DIAGNOSIS — R93.89 ABNORMAL CHEST X-RAY: ICD-10-CM

## 2018-10-09 DIAGNOSIS — A04.72 CLOSTRIDIUM DIFFICILE COLITIS: Primary | ICD-10-CM

## 2018-10-09 DIAGNOSIS — N80.03: ICD-10-CM

## 2018-10-09 PROBLEM — D64.9 ABSOLUTE ANEMIA: Status: ACTIVE | Noted: 2018-10-09

## 2018-10-09 LAB — SENTARA SPECIMEN COL,SENBCF: NORMAL

## 2018-10-09 PROCEDURE — 99001 SPECIMEN HANDLING PT-LAB: CPT | Performed by: FAMILY MEDICINE

## 2018-10-09 PROCEDURE — 71046 X-RAY EXAM CHEST 2 VIEWS: CPT

## 2018-10-09 NOTE — PATIENT INSTRUCTIONS
Iron-Rich Diet: Care Instructions  Your Care Instructions    Your body needs iron to make hemoglobin. Hemoglobin is a substance in red blood cells that carries oxygen from the lungs to cells all through your body. If you do not get enough iron, your body makes fewer and smaller red blood cells. As a result, your body's cells may not get enough oxygen. Adult men need 8 milligrams of iron a day; adult women need 18 milligrams of iron a day. After menopause, women need 8 milligrams of iron a day. A pregnant woman needs 27 milligrams of iron a day. Infants and young children have higher iron needs relative to their size than other age groups. People who have lost blood because of ulcers or heavy menstrual periods may become very low in iron and may develop anemia. Most people can get the iron their bodies need by eating enough of certain iron-rich foods. Your doctor may recommend that you take an iron supplement along with eating an iron-rich diet. Follow-up care is a key part of your treatment and safety. Be sure to make and go to all appointments, and call your doctor if you are having problems. It's also a good idea to know your test results and keep a list of the medicines you take. How can you care for yourself at home? · Make iron-rich foods a part of your daily diet. Iron-rich foods include:  ¨ All meats, such as chicken, beef, lamb, pork, fish, and shellfish. Liver is especially high in iron. ¨ Leafy green vegetables. ¨ Raisins, peas, beans, lentils, barley, and eggs. ¨ Iron-fortified breakfast cereals. · Eat foods with vitamin C along with iron-rich foods. Vitamin C helps you absorb more iron from food. Drink a glass of orange juice or another citrus juice with your food. · Eat meat and vegetables or grains together. The iron in meat helps your body absorb the iron in other foods. Where can you learn more? Go to http://digna-santiago.info/.   Enter [id number] in the search box to learn more about \"Iron-Rich Diet: Care Instructions. \"  Current as of: March 29, 2018  Content Version: 11.8  © 4882-0010 [employer]wise, [employer]. Care instructions adapted under license by [employer] (which disclaims liability or warranty for this information). If you have questions about a medical condition or this instruction, always ask your healthcare professional. [address] any warranty or liability for your use of this information.

## 2018-10-09 NOTE — PROGRESS NOTES
HISTORY OF PRESENT ILLNESS  Erica Chambers is a 28 y.o. female. HPI: Here for follow up on c.diff, pneumonia after recent procedure of myomectomy with bilateral salpingectomy for endometriosis. She had repeat chest  X-day done last visit showed ? Foreign body in rt apex of lung. She did not have any procedure or needle placed over shown area. She was asked to go to ER. Review ER records and she was d/c home with recommendations of repeat x=ray in 2 wks. She denies any cough or fever. No chest pain or sob. No nausea or vomiting. Also she has no diarrhea. Has normal stool and bowel movement. Completed antibiotic per recommendations. No abdominal pain. No appetite or weight changes. Also noted anemia and she is on oral iron supplement. Said felt much better with fatigue after iron supplement. Last visit also felt some nausea but no vomiting. Given symptomatic treatment with ranitidine and antiemetic. It has been resolved completely. Had her period recently and it was much better with pain and flow. Visit Vitals    /78 (BP 1 Location: Left arm, BP Patient Position: Sitting)    Pulse 72    Temp 98.4 °F (36.9 °C) (Oral)    Resp 16    Ht 5' 5.5\" (1.664 m)    Wt 134 lb 12.8 oz (61.1 kg)    SpO2 100%    BMI 22.09 kg/m2     Review medication list, vitals, problem list,allergies. Sitting comfortable. Denies any headache, dizziness, no chest pain or trouble breathing, no arm or leg weakness. No nausea or vomiting, no weight or appetite changes, no depression or anxiety. No urine or bowel complains, no palpitation, no diaphoresis.      Visit Vitals    /78 (BP 1 Location: Left arm, BP Patient Position: Sitting)    Pulse 72    Temp 98.4 °F (36.9 °C) (Oral)    Resp 16    Ht 5' 5.5\" (1.664 m)    Wt 134 lb 12.8 oz (61.1 kg)    SpO2 100%    BMI 22.09 kg/m2     XR Results (most recent):    Results from Hospital Encounter encounter on 10/09/18   XR CHEST PA LAT   Narrative Chest  PA and lateral views    INDICATION:  Follow-up abnormal chest x-ray    COMPARISON:  Chest x-ray 9/7/2018    FINDINGS:  The cardiac silhouette is normal in size. The pulmonary vasculature  is unremarkable. No focal consolidation, pleural effusion, or pneumothorax. No  acute osseous abnormalities are identified. Previously seen linear density  projecting at the right apex is no longer visualized. Impression IMPRESSION:  No radiographic evidence for acute cardiopulmonary process. .  Previously seen linear density at the right apex is no longer visualized. Had review cbc from care everywhere. ROS: see HPI     Physical Exam   Constitutional: She is oriented to person, place, and time. No distress. Cardiovascular: Normal rate, regular rhythm and normal heart sounds. Pulmonary/Chest:   CTA   Abdominal: Soft. Bowel sounds are normal. There is no tenderness. Musculoskeletal: She exhibits no edema. Neurological: She is oriented to person, place, and time. Psychiatric: Her behavior is normal.       ASSESSMENT and PLAN    ICD-10-CM ICD-9-CM    1. Clostridium difficile colitis: for now done with antibiotic. Completely resolve diarrhea. Normal formed stool. No blood in stool. Will observe. A04.72 008.45    2. Abnormal chest x-ray:  ? Foreign body / needle. Will repeat chest x0ray  R93.89 793.2 XR CHEST PA LAT   3. Other iron deficiency anemia: on iron supplement. Repeat labs and also given hand out on iron rich diet. F/u after lab result. D50.8 280.8    4. Nausea: improved . ranitidine as needed. R11.0 787.02    5. H/o endometriosis\" post procedure, bilateral salpingectomy. Recent period much improved with pain and flow. For now she is trying to conceive. Pt understood and agree with the plan     Follow-up Disposition:  Return in about 4 months (around 2/9/2019).

## 2018-10-09 NOTE — MR AVS SNAPSHOT
[address] 
[phone] Patient: Gennett Snellen MRN: [id number] PARTH:[birth year] Visit Information Date & Time Provider Department Dept. Phone Encounter #  
 10/9/2018 10:00 AM Pamela Cordero, [address] [address] [id number] Follow-up Instructions Return in about 4 months (around 2/9/2019). Upcoming Health Maintenance Date Due Influenza Age 5 to Adult 8/1/2018 PAP AKA CERVICAL CYTOLOGY 6/8/2020 DTaP/Tdap/Td series (2 - Td) 11/7/2027 Allergies as of 10/9/2018  Review Complete On: 10/9/2018 By: Britney Haskins No Known Allergies Current Immunizations  Never Reviewed Name Date Influenza Vaccine 11/1/2016 Influenza Vaccine (Quad) PF 11/7/2017 Not reviewed this visit You Were Diagnosed With   
  
 Codes Comments Clostridium difficile colitis    -  Primary ICD-10-CM: A04.72 
ICD-9-CM: 008.45 Abnormal chest x-ray     ICD-10-CM: R93.89 ICD-9-CM: 793.2 Other iron deficiency anemia     ICD-10-CM: D50.8 ICD-9-CM: 280.8 Nausea     ICD-10-CM: R11.0 ICD-9-CM: 787.02 Vitals BP Pulse Temp Resp Height(growth percentile) Weight(growth percentile) 110/78 (BP 1 Location: Left arm, BP Patient Position: Sitting) 72 98.4 °F (36.9 °C) (Oral) 16 5' 5.5\" (1.664 m) 134 lb 12.8 oz (61.1 kg) LMP SpO2 BMI Smoking Status 10/06/2018 100% 22.09 kg/m2 Never Smoker BMI and BSA Data Body Mass Index Body Surface Area 22.09 kg/m 2 1.68 m 2 Preferred Pharmacy Pharmacy Name Phone [facility], [address] [facility] [phone] Your Updated Medication List  
  
   
This list is accurate as of 10/9/18 10:16 AM.  Always use your most recent med list.  
  
  
  
  
 ferrous sulfate 325 mg (65 mg iron) tablet  
take 1 tablet by mouth twice a day ibuprofen 200 mg tablet Commonly known as:  MOTRIN  
200 mg.  
  
 ondansetron 4 mg disintegrating tablet Commonly known as:  ZOFRAN ODT  
4 mg. Follow-up Instructions Return in about 4 months (around 2/9/2019). To-Do List   
 10/09/2018 Imaging:  XR CHEST PA LAT Patient Instructions Iron-Rich Diet: Care Instructions Your Care Instructions Your body needs iron to make hemoglobin. Hemoglobin is a substance in red blood cells that carries oxygen from the lungs to cells all through your body. If you do not get enough iron, your body makes fewer and smaller red blood cells. As a result, your body's cells may not get enough oxygen. Adult men need 8 milligrams of iron a day; adult women need 18 milligrams of iron a day. After menopause, women need 8 milligrams of iron a day. A pregnant woman needs 27 milligrams of iron a day. Infants and young children have higher iron needs relative to their size than other age groups. People who have lost blood because of ulcers or heavy menstrual periods may become very low in iron and may develop anemia. Most people can get the iron their bodies need by eating enough of certain iron-rich foods. Your doctor may recommend that you take an iron supplement along with eating an iron-rich diet. Follow-up care is a key part of your treatment and safety. Be sure to make and go to all appointments, and call your doctor if you are having problems. It's also a good idea to know your test results and keep a list of the medicines you take. How can you care for yourself at home? · Make iron-rich foods a part of your daily diet. Iron-rich foods include: ¨ All meats, such as chicken, beef, lamb, pork, fish, and shellfish. Liver is especially high in iron. ¨ Leafy green vegetables. ¨ Raisins, peas, beans, lentils, barley, and eggs. ¨ Iron-fortified breakfast cereals. · Eat foods with vitamin C along with iron-rich foods. Vitamin C helps you absorb more iron from food. Drink a glass of orange juice or another citrus juice with your food. · Eat meat and vegetables or grains together. The iron in meat helps your body absorb the iron in other foods. Where can you learn more? Go to http://digna-santiago.info/. Enter [id number] [id number] in the search box to learn more about \"Iron-Rich Diet: Care Instructions. \" Current as of: March 29, 2018 Content Version: 11.8 © 2899-6394 [employer]. Care instructions adapted under license by [employer] (which disclaims liability or warranty for this information). If you have questions about a medical condition or this instruction, always ask your healthcare professional. [address] any warranty or liability for your use of this information. Introducing [facility] & HEALTH SERVICES! [employer] introduces Bright Things patient portal. Now you can access parts of your medical record, email your doctor's office, and request medication refills online. 1. In your internet browser, go to [email or web address] 2. Click on the First Time User? Click Here link in the Sign In box. You will see the New Member Sign Up page. 3. Enter your Bright Things Access Code exactly as it appears below. You will not need to use this code after youve completed the sign-up process. If you do not sign up before the expiration date, you must request a new code. · Bright Things Access Code: [email or web address] Expires: 12/6/2018 12:08 PM 
 
4. Enter the last four digits of your Social Security Number (xxxx) and Date of Birth (mm/dd/yyyy) as indicated and click Submit. You will be taken to the next sign-up page. 5. Create a Bright Things ID. This will be your Bright Things login ID and cannot be changed, so think of one that is secure and easy to remember. 6. Create a SnapDash password. You can change your password at any time. 7. Enter your Password Reset Question and Answer. This can be used at a later time if you forget your password. 8. Enter your e-mail address. You will receive e-mail notification when new information is available in [address] 9. Click Sign Up. You can now view and download portions of your medical record. 10. Click the Download Summary menu link to download a portable copy of your medical information. If you have questions, please visit the Frequently Asked Questions section of the SnapDash website. Remember, SnapDash is NOT to be used for urgent needs. For medical emergencies, dial 911. Now available from your iPhone and Android! Please provide this summary of care documentation to your next provider. Your primary care clinician is listed as Flor Cruz. If you have any questions after today's visit, please call [phone].

## 2018-10-09 NOTE — PROGRESS NOTES
1. Have you been to the ER, urgent care clinic since your last visit? Hospitalized since your last visit? [facility] 9/07/18    2. Have you seen or consulted any other health care providers outside of the [facility] since your last visit? Include any pap smears or colon screening. No    Patient has not had flu vaccine; requests today.

## 2018-10-10 LAB
A-G RATIO,AGRAT: 1.4 RATIO (ref 1.1–2.6)
ALBUMIN SERPL-MCNC: 5 G/DL (ref 3.5–5)
ALP SERPL-CCNC: 71 U/L (ref 25–115)
ALT SERPL-CCNC: 21 U/L (ref 5–40)
ANION GAP SERPL CALC-SCNC: 22 MMOL/L
ANISOCYTOSIS: ABNORMAL
AST SERPL W P-5'-P-CCNC: 23 U/L (ref 10–37)
BILIRUB SERPL-MCNC: 0.7 MG/DL (ref 0.2–1.2)
BUN SERPL-MCNC: 7 MG/DL (ref 6–22)
CALCIUM SERPL-MCNC: 9.4 MG/DL (ref 8.4–10.5)
CHLORIDE SERPL-SCNC: 97 MMOL/L (ref 98–110)
CO2 SERPL-SCNC: 22 MMOL/L (ref 20–32)
CREAT SERPL-MCNC: 0.6 MG/DL (ref 0.5–1.2)
ELIPTOCYTES: ABNORMAL
ERYTHROCYTE [DISTWIDTH] IN BLOOD BY AUTOMATED COUNT: 20.5 % (ref 10–15.5)
GFRAA, 66117: >60
GFRNA, 66118: >60
GLOBULIN,GLOB: 3.5 G/DL (ref 2–4)
GLUCOSE SERPL-MCNC: 63 MG/DL (ref 70–99)
HCT VFR BLD AUTO: 34.7 % (ref 35.1–46.5)
HGB BLD-MCNC: 10.5 G/DL (ref 11.7–15.5)
HYPOCHROMIA, 12007: ABNORMAL
MCH RBC QN AUTO: 22 PG (ref 26–34)
MCHC RBC AUTO-ENTMCNC: 30 G/DL (ref 31–36)
MCV RBC AUTO: 74 FL (ref 80–95)
MICROCYTES, 12013: ABNORMAL
PLATELET # BLD AUTO: 244 K/UL (ref 140–440)
POTASSIUM SERPL-SCNC: 3.5 MMOL/L (ref 3.5–5.5)
PROT SERPL-MCNC: 8.5 G/DL (ref 6.4–8.3)
RBC # BLD AUTO: 4.7 M/UL (ref 3.8–5.2)
SMEAR EVAL, 1131: ABNORMAL
SODIUM SERPL-SCNC: 141 MMOL/L (ref 133–145)
WBC # BLD AUTO: 5.3 K/UL (ref 4–11)

## 2018-10-12 NOTE — PROGRESS NOTES
Contacted patient and verified identity using name and date of birth (2- identifiers)  Spoke with patient and she verbalized understanding of negative CXR.

## 2018-10-17 DIAGNOSIS — D50.8 OTHER IRON DEFICIENCY ANEMIA: Primary | ICD-10-CM

## 2018-10-17 NOTE — PROGRESS NOTES
Contacted patient and verified identity using name and date of birth (2- identifiers)  Spoke with patient and she verbalized understanding of low glucose and to eat on time, also lower H & H and need to continue with oral Iron and need for Heme appointment.

## 2018-10-17 NOTE — PROGRESS NOTES
Let pt know that lab showed low glucose. Please advise her to eat on time. Also noted lower H & H compare to last lab results. Continue oral iron and also sending her to hematology for ? Infusion as recently had c.diff and ? Absorption for oral iron. Thanks.

## 2018-11-20 ENCOUNTER — OFFICE VISIT (OUTPATIENT)
Dept: FAMILY MEDICINE CLINIC | Age: 33
End: 2018-11-20

## 2018-11-20 VITALS
WEIGHT: 139 LBS | DIASTOLIC BLOOD PRESSURE: 70 MMHG | HEART RATE: 74 BPM | RESPIRATION RATE: 16 BRPM | BODY MASS INDEX: 22.34 KG/M2 | TEMPERATURE: 99 F | SYSTOLIC BLOOD PRESSURE: 106 MMHG | HEIGHT: 66 IN | OXYGEN SATURATION: 98 %

## 2018-11-20 DIAGNOSIS — R10.2 PELVIC PAIN: ICD-10-CM

## 2018-11-20 DIAGNOSIS — R07.89 CHEST DISCOMFORT: Primary | ICD-10-CM

## 2018-11-20 DIAGNOSIS — N80.9 ENDOMETRIOSIS: ICD-10-CM

## 2018-11-20 RX ORDER — ALBUTEROL SULFATE 90 UG/1
2 AEROSOL, METERED RESPIRATORY (INHALATION)
Qty: 1 INHALER | Status: CANCELLED | OUTPATIENT
Start: 2018-11-20

## 2018-11-20 RX ORDER — ALBUTEROL SULFATE 90 UG/1
2 AEROSOL, METERED RESPIRATORY (INHALATION)
Qty: 1 INHALER | Refills: 0 | Status: SHIPPED | OUTPATIENT
Start: 2018-11-20

## 2018-11-20 NOTE — LETTER
11/20/2018 3:21 PM 
 
Ms. Erica Liang [address] [address] Ms. Erica Liang is a patient of [employer] under my care. She is cleared to return to work medically with restriction of weight lifting due to chronic pelvic pain due to endometriosis. Please call my office for any concern or questions. Sincerely, Flor Cruz MD

## 2018-11-20 NOTE — PROGRESS NOTES
Chief Complaint   Patient presents with    Letter for School/Work     return to work     1. Have you been to the ER, urgent care clinic since your last visit? Hospitalized since your last visit? No    2. Have you seen or consulted any other health care providers outside of the [address] [place] [address] since your last visit? Include any pap smears or colon screening.  Hematology

## 2018-11-20 NOTE — PROGRESS NOTES
HISTORY OF PRESENT ILLNESS  Erica Liang is a 28 y.o. female. HPI: Here with concern of chest discomfort. Feels on and off,. No sob. No cough or wheezing. She had recently surgery and had post op complications of pneumonia and c/diff. Since then she does feel on and off this symptoms and improves with incentive spirometry. She is asking for a new machine . Given prescription today. Denies any chest pain. No palpitation or diaphoresis. No abdominal pain. No urinary or bowel complains. She has some chronic pelvic pain on and off flairs up due to endometriosis history. No pelvic discharge. No pelvic pain at this time. 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 Said she needs to help patients by lifting, pushing and banding. She does do it well but due to endometriosis occasionally has pelvic pain. Needed work letter to return to work. She is asymptomatic at this time. Vitals stable. Visit Vitals  /70 (BP 1 Location: Left arm, BP Patient Position: Sitting)   Pulse 74   Temp 99 °F (37.2 °C) (Oral)   Resp 16   Ht 5' 5.5\" (1.664 m)   Wt 139 lb (63 kg)   SpO2 98%   BMI 22.78 kg/m²     Review medication list, vitals, problem list,allergies. Asymptomatic during today's visit. ROS: see HPI     Physical Exam   Constitutional: She is oriented to person, place, and time. No distress. Cardiovascular: Normal heart sounds. Pulmonary/Chest: No respiratory distress. She has no wheezes. Abdominal: Soft. There is no tenderness. Neurological: She is oriented to person, place, and time. Psychiatric: Her behavior is normal.       ASSESSMENT and PLAN    ICD-10-CM ICD-9-CM    1. Chest discomfort: for now asymptomatic. Given incentive spirometry prescription. Also given albuterol. She has no h/o asthma. Does not smoke. Will f/u next visit. R07.89 786.59    2. Pelvic pain; on and off. Prior surgery and also h/o endometriosis.  Currently asymptomatic. Work letter given. R10.2 [id number]    3. Endometriosis: stable. N80.9 617.9    Pt understood and agree with the plan   Review    Follow-up Disposition:  Return in about 2 months (around 1/20/2019).

## 2019-07-22 ENCOUNTER — OFFICE VISIT (OUTPATIENT)
Dept: FAMILY MEDICINE CLINIC | Age: 34
End: 2019-07-22

## 2019-07-22 VITALS
TEMPERATURE: 99 F | RESPIRATION RATE: 16 BRPM | HEART RATE: 72 BPM | SYSTOLIC BLOOD PRESSURE: 100 MMHG | DIASTOLIC BLOOD PRESSURE: 66 MMHG | WEIGHT: 138 LBS | HEIGHT: 66 IN | BODY MASS INDEX: 22.18 KG/M2 | OXYGEN SATURATION: 99 %

## 2019-07-22 DIAGNOSIS — Z23 NEED FOR TDAP VACCINATION: ICD-10-CM

## 2019-07-22 DIAGNOSIS — Z28.39 IMMUNIZATION DEFICIENCY: ICD-10-CM

## 2019-07-22 DIAGNOSIS — R14.0 BLOATING: ICD-10-CM

## 2019-07-22 DIAGNOSIS — D50.8 OTHER IRON DEFICIENCY ANEMIA: ICD-10-CM

## 2019-07-22 DIAGNOSIS — Z00.00 ROUTINE PHYSICAL EXAMINATION: Primary | ICD-10-CM

## 2019-07-22 LAB
POC BOTH EYES RESULT, BOTHEYE: NORMAL
POC LEFT EYE RESULT, LFTEYE: NORMAL
POC RIGHT EYE RESULT, RGTEYE: NORMAL

## 2019-07-22 RX ORDER — ACETAMINOPHEN AND CODEINE PHOSPHATE 120; 12 MG/5ML; MG/5ML
SOLUTION ORAL
COMMUNITY

## 2019-07-22 NOTE — PROGRESS NOTES
Subjective:   27 y.o. female for Well Woman Check. Her for physical for work. No specific concern except bloating. Feels gassy. On and off constipation. No abdominla pain. No blood in bowel movement. No weight or appetite changes. She has iron deficiency anemia. On supplement. Also following hematology which has been giving on and off constipation. No abdominal pain. No nausea or vomiting. No appetite or weight changes. PPD she had done at urgent care. She will provide a result. No recent travel outside [place], no cold or cough, no chest congestion, no fever, no night sweats, no weight or appetite changes. Never been in contact with active TB. Lack of immunization records. Done titer. Agree to get TDap today. Vision test done. Patient Active Problem List    Diagnosis Date Noted    Endometriosis, myometrium 10/09/2018    Absolute anemia 10/09/2018    Anxiety/ occassionally. not on any medication  11/07/2017    Endometriosis/ removal of rt ovarie, now left ovarian cyst  03/27/2017     Current Outpatient Medications   Medication Sig Dispense Refill    norethindrone (MICRONOR) 0.35 mg tab Take  by mouth.  ibuprofen (MOTRIN) 200 mg tablet 200 mg.  ferrous sulfate 325 mg (65 mg iron) tablet take 1 tablet by mouth twice a day  0    albuterol (PROVENTIL HFA, VENTOLIN HFA, PROAIR HFA) 90 mcg/actuation inhaler Take 2 Puffs by inhalation every four (4) hours as needed for Wheezing. 1 Inhaler 0    ondansetron (ZOFRAN ODT) 4 mg disintegrating tablet 4 mg. No Known Allergies  No past medical history on file.   Past Surgical History:   Procedure Laterality Date    HX OTHER SURGICAL      ovarian cyst on right ovary - 8/2010     Family History   Problem Relation Age of Onset    Substance Abuse Mother         tobacco use    Hypertension Mother     Heart Failure Mother     Kidney Disease Mother      Social History     Tobacco Use    Smoking status: Never Smoker    Smokeless tobacco: Never Used   Substance Use Topics    Alcohol use: Yes     Comment: occ        Review labs. Lab Results   Component Value Date/Time    WBC 5.3 10/09/2018 12:00 PM    HGB 10.5 (L) 10/09/2018 12:00 PM    HCT 34.7 (L) 10/09/2018 12:00 PM    PLATELET 843 97/12/0166 12:00 PM    MCV 74 (L) 10/09/2018 12:00 PM     Lab Results   Component Value Date/Time    Sodium 141 10/09/2018 12:00 PM    Potassium 3.5 10/09/2018 12:00 PM    Chloride 97 (L) 10/09/2018 12:00 PM    CO2 22 10/09/2018 12:00 PM    Anion gap 22.0 10/09/2018 12:00 PM    Glucose 63 (L) 10/09/2018 12:00 PM    BUN 7 10/09/2018 12:00 PM    Creatinine 0.6 10/09/2018 12:00 PM    BUN/Creatinine ratio 11 03/27/2017 12:00 AM    GFR est AA 90 03/27/2017 12:00 AM    GFR est non-AA 79 03/27/2017 12:00 AM    Calcium 9.4 10/09/2018 12:00 PM    Bilirubin, total 0.7 10/09/2018 12:00 PM    AST (SGOT) 23 10/09/2018 12:00 PM    Alk. phosphatase 71 10/09/2018 12:00 PM    Protein, total 8.5 (H) 10/09/2018 12:00 PM    Albumin 5.0 10/09/2018 12:00 PM    Globulin 3.5 10/09/2018 12:00 PM    A-G Ratio 1.4 10/09/2018 12:00 PM    ALT (SGPT) 21 10/09/2018 12:00 PM     Lab Results   Component Value Date/Time    TSH 2.140 03/27/2017 12:00 AM     Lab Results   Component Value Date/Time    Hemoglobin A1c 5.6 03/27/2017 12:00 AM     Lab Results   Component Value Date/Time    Cholesterol, total 166 03/27/2017 12:00 AM    HDL Cholesterol 55 03/27/2017 12:00 AM    LDL, calculated 95 03/27/2017 12:00 AM    VLDL, calculated 16 03/27/2017 12:00 AM    Triglyceride 79 03/27/2017 12:00 AM         ROS: Feeling generally well. No TIA's or unusual headaches, no dysphagia. No prolonged cough. No dyspnea or chest pain on exertion. No abdominal pain, change in bowel habits, black or bloody stools. No urinary tract symptoms. No new or unusual musculoskeletal symptoms. Objective: The patient appears well, alert, oriented x 3, in no distress.   Visit Vitals  /66 (BP 1 Location: Left arm, BP Patient Position: Sitting)   Pulse 72   Temp 99 °F (37.2 °C) (Oral)   Resp 16   Ht 5' 5.5\" (1.664 m)   Wt 138 lb (62.6 kg)   SpO2 99%   BMI 22.62 kg/m²     ENT normal.  Neck supple. No adenopathy or thyromegaly. CYNTHIA. Lungs are clear, good air entry, no wheezes, rhonchi or rales. S1 and S2 normal, no murmurs, regular rate and rhythm. Abdomen soft without tenderness, guarding, mass or organomegaly. Extremities show no edema, normal peripheral pulses. Neurological is normal, no focal findings. Breast and Pelvic exams are deferred. Assessment/Plan:       ICD-10-CM ICD-9-CM    1. Routine physical exam Z00.00 V70.0 AMB POC VISUAL ACUITY SCREEN    [V70.0]   2. Bloating: could be from iron supplement. Discussed to drink more fluid, walking and metamucil otc to avoid constipation. R14.0 787.3    3. Immunization deficiency: checking titer  Z28.3 V15.83 VZV AB, IGG      RUBEOLA AB, IGG      MUMPS AB, IGG      RUBELLA AB, IGG   4. Other iron deficiency anemia: on iron supplement. Following hematology. D50.8 280.8    5.  Need for Tdap vaccination Z23 V06.1 TETANUS, DIPHTHERIA TOXOIDS AND ACELLULAR PERTUSSIS VACCINE (TDAP), IN INDIVIDS. >=7, IM   Pt understood and agree with the plan   Review hM

## 2019-07-22 NOTE — PATIENT INSTRUCTIONS
Iron-Rich Diet: Care Instructions  Your Care Instructions    Your body needs iron to make hemoglobin. Hemoglobin is a substance in red blood cells that carries oxygen from the lungs to cells all through your body. If you do not get enough iron, your body makes fewer and smaller red blood cells. As a result, your body's cells may not get enough oxygen. Adult men need 8 milligrams of iron a day; adult women need 18 milligrams of iron a day. After menopause, women need 8 milligrams of iron a day. A pregnant woman needs 27 milligrams of iron a day. Infants and young children have higher iron needs relative to their size than other age groups. People who have lost blood because of ulcers or heavy menstrual periods may become very low in iron and may develop anemia. Most people can get the iron their bodies need by eating enough of certain iron-rich foods. Your doctor may recommend that you take an iron supplement along with eating an iron-rich diet. Follow-up care is a key part of your treatment and safety. Be sure to make and go to all appointments, and call your doctor if you are having problems. It's also a good idea to know your test results and keep a list of the medicines you take. How can you care for yourself at home? · Make iron-rich foods a part of your daily diet. Iron-rich foods include:  ? All meats, such as chicken, beef, lamb, pork, fish, and shellfish. Liver is especially high in iron. ? Leafy green vegetables. ? Raisins, peas, beans, lentils, barley, and eggs. ? Iron-fortified breakfast cereals. · Eat foods with vitamin C along with iron-rich foods. Vitamin C helps you absorb more iron from food. Drink a glass of orange juice or another citrus juice with your food. · Eat meat and vegetables or grains together. The iron in meat helps your body absorb the iron in other foods. Where can you learn more? Go to http://digna-santiago.info/.   Enter [id number] in the search box to learn more about \"Iron-Rich Diet: Care Instructions. \"  Current as of: November 7, 2018  Content Version: 12.1  © 3749-2036 [employer]. Care instructions adapted under license by [employer] (which disclaims liability or warranty for this information). If you have questions about a medical condition or this instruction, always ask your healthcare professional. [address] any warranty or liability for your use of this information. Gas and Bloating: Care Instructions  Your Care Instructions    Gas and bloating can be uncomfortable and embarrassing problems. All people pass gas, but some people produce more gas than others, sometimes enough to cause distress. It is normal to pass gas from 6 to 20 times per day. Excess gas usually is not caused by a serious health problem. Gas and bloating usually are caused by something you eat or drink, including some food supplements and medicines. Gas and bloating are usually harmless and go away without treatment. However, changing your diet can help end the problem. Some over-the-counter medicines can help prevent gas and relieve bloating. Follow-up care is a key part of your treatment and safety. Be sure to make and go to all appointments, and call your doctor if you are having problems. It's also a good idea to know your test results and keep a list of the medicines you take. How can you care for yourself at home? · Keep a food diary if you think a food gives you gas. Write down what you eat or drink. Also record when you get gas. If you notice that a food seems to cause your gas each time, avoid it and see if the gas goes away. Examples of foods that cause gas include:  ? Fried and fatty foods. ? Beans. ? Vegetables such as artichokes, asparagus, broccoli, brussels sprouts, cabbage, cauliflower, cucumbers, green peppers, onions, peas, radishes, and raw potatoes. ?  Fruits such as apricots, bananas, melons, peaches, pears, prunes, and raw apples. ? Wheat and wheat bran. · Soak dry beans in water overnight, then dump the water and cook the soaked beans in new water. This can help prevent gas and bloating. · If you have problems with lactose, avoid dairy products such as milk and cheese. · Try not to swallow air. Do not drink through a straw, gulp your food, or chew gum. · Take an over-the-counter medicine. Read and follow all instructions on the label. ? Food enzymes, such as Beano, can be added to gas-producing foods to prevent gas. ? Antacids, such as Maalox Anti-Gas and Mylanta Gas, can relieve bloating by making you burp. Be careful when you take over-the-counter antacid medicines. Many of these medicines have aspirin in them. Read the label to make sure that you are not taking more than the recommended dose. Too much aspirin can be harmful. ? Activated charcoal tablets, such as CharcoCaps, may decrease odor from gas you pass. ? If you have problems with lactose, you can take medicines such as Dairy Ease and Lactaid with dairy products to prevent gas and bloating. · Get some exercise regularly. When should you call for help? Call 911 anytime you think you may need emergency care. For example, call if:    · You have gas and signs of a heart attack, such as:  ? Chest pain or pressure. ? Sweating. ? Shortness of breath. ? Nausea or vomiting. ? Pain that spreads from the chest to the neck, jaw, or one or both shoulders or arms. ? Dizziness or lightheadedness. ? A fast or uneven pulse. After calling 911, chew 1 adult-strength aspirin. Wait for an ambulance.  Do not try to drive yourself.    Call your doctor now or seek immediate medical care if:    · You have severe belly pain.     · You have blood in your stool.    Watch closely for changes in your health, and be sure to contact your doctor if:    · You have blood or pus in your urine.     · Your urine is cloudy or smells bad.     · You are burping and have trouble swallowing.     · You feel bloated and have swelling in your belly.     · You do not get better as expected. Where can you learn more? Go to http://digna-santiago.info/. Enter F456 in the search box to learn more about \"Gas and Bloating: Care Instructions. \"  Current as of: September 23, 2018  Content Version: 12.1  © 4789-6862 [employer]. Care instructions adapted under license by [employer] (which disclaims liability or warranty for this information). If you have questions about a medical condition or this instruction, always ask your healthcare professional. [address] [address] any warranty or liability for your use of this information.

## 2019-07-22 NOTE — PROGRESS NOTES
Chief Complaint   Patient presents with    Complete Physical     1. Have you been to the ER, urgent care clinic since your last visit? Hospitalized since your last visit? No    2. Have you seen or consulted any other health care providers outside of the [address] [place] [address] since your last visit? Include any pap smears or colon screening.  [facility]- Dr. Sabas Tolliver

## 2019-07-23 ENCOUNTER — HOSPITAL ENCOUNTER (OUTPATIENT)
Dept: LAB | Age: 34
Discharge: HOME | End: 2019-07-23

## 2019-07-23 LAB — SENTARA SPECIMEN COL,SENBCF: NORMAL

## 2019-07-23 PROCEDURE — 99001 SPECIMEN HANDLING PT-LAB: CPT

## 2019-07-24 LAB
MEV IGG SER IA-ACNC: 2.8 AI
MUV IGG SER-ACNC: 4.9 AI
RUBV IGG SERPL IA-ACNC: 4.4 AI
VZV IGM SER IA-ACNC: 7.4 AI

## 2019-07-25 NOTE — PROGRESS NOTES
Let pt know that she is immune with MMR and varicella. Does she need hepatitis titer? ? I have not ordered it .can you please double check and if needed can add to drawn blood. Thanks.

## 2019-08-12 RX ORDER — LANOLIN ALCOHOL/MO/W.PET/CERES
CREAM (GRAM) TOPICAL
Qty: 60 TAB | Refills: 0 | Status: SHIPPED | OUTPATIENT
Start: 2019-08-12

## 2019-09-16 ENCOUNTER — TELEPHONE (OUTPATIENT)
Dept: FAMILY MEDICINE CLINIC | Age: 34
End: 2019-09-16

## 2021-05-09 ENCOUNTER — EMERGENCY (EMERGENCY)
Facility: HOSPITAL | Age: 36
LOS: 1 days | Discharge: HOME | End: 2021-05-09
Attending: STUDENT IN AN ORGANIZED HEALTH CARE EDUCATION/TRAINING PROGRAM | Admitting: STUDENT IN AN ORGANIZED HEALTH CARE EDUCATION/TRAINING PROGRAM
Payer: COMMERCIAL

## 2021-05-09 VITALS
SYSTOLIC BLOOD PRESSURE: 121 MMHG | TEMPERATURE: 98 F | OXYGEN SATURATION: 100 % | DIASTOLIC BLOOD PRESSURE: 75 MMHG | HEART RATE: 74 BPM | RESPIRATION RATE: 16 BRPM

## 2021-05-09 LAB
ALBUMIN SERPL ELPH-MCNC: 4.6 G/DL — SIGNIFICANT CHANGE UP (ref 3.3–5)
ALP SERPL-CCNC: 57 U/L — SIGNIFICANT CHANGE UP (ref 40–120)
ALT FLD-CCNC: 22 U/L — SIGNIFICANT CHANGE UP (ref 4–33)
ANION GAP SERPL CALC-SCNC: 9 MMOL/L — SIGNIFICANT CHANGE UP (ref 7–14)
APPEARANCE UR: CLEAR — SIGNIFICANT CHANGE UP
AST SERPL-CCNC: 21 U/L — SIGNIFICANT CHANGE UP (ref 4–32)
B PERT DNA SPEC QL NAA+PROBE: SIGNIFICANT CHANGE UP
BACTERIA # UR AUTO: NEGATIVE — SIGNIFICANT CHANGE UP
BASOPHILS # BLD AUTO: 0 K/UL — SIGNIFICANT CHANGE UP (ref 0–0.2)
BASOPHILS NFR BLD AUTO: 0 % — SIGNIFICANT CHANGE UP (ref 0–2)
BILIRUB SERPL-MCNC: 0.6 MG/DL — SIGNIFICANT CHANGE UP (ref 0.2–1.2)
BILIRUB UR-MCNC: NEGATIVE — SIGNIFICANT CHANGE UP
BUN SERPL-MCNC: 8 MG/DL — SIGNIFICANT CHANGE UP (ref 7–23)
C PNEUM DNA SPEC QL NAA+PROBE: SIGNIFICANT CHANGE UP
CALCIUM SERPL-MCNC: 9.9 MG/DL — SIGNIFICANT CHANGE UP (ref 8.4–10.5)
CHLORIDE SERPL-SCNC: 102 MMOL/L — SIGNIFICANT CHANGE UP (ref 98–107)
CO2 SERPL-SCNC: 28 MMOL/L — SIGNIFICANT CHANGE UP (ref 22–31)
COLOR SPEC: YELLOW — SIGNIFICANT CHANGE UP
CREAT SERPL-MCNC: 0.76 MG/DL — SIGNIFICANT CHANGE UP (ref 0.5–1.3)
DIFF PNL FLD: ABNORMAL
EOSINOPHIL # BLD AUTO: 0.09 K/UL — SIGNIFICANT CHANGE UP (ref 0–0.5)
EOSINOPHIL NFR BLD AUTO: 1.8 % — SIGNIFICANT CHANGE UP (ref 0–6)
EPI CELLS # UR: 3 /HPF — SIGNIFICANT CHANGE UP (ref 0–5)
FLUAV SUBTYP SPEC NAA+PROBE: SIGNIFICANT CHANGE UP
FLUBV RNA SPEC QL NAA+PROBE: SIGNIFICANT CHANGE UP
GLUCOSE SERPL-MCNC: 94 MG/DL — SIGNIFICANT CHANGE UP (ref 70–99)
GLUCOSE UR QL: NEGATIVE — SIGNIFICANT CHANGE UP
HADV DNA SPEC QL NAA+PROBE: SIGNIFICANT CHANGE UP
HCG SERPL-ACNC: <5 MIU/ML — SIGNIFICANT CHANGE UP
HCOV 229E RNA SPEC QL NAA+PROBE: SIGNIFICANT CHANGE UP
HCOV HKU1 RNA SPEC QL NAA+PROBE: SIGNIFICANT CHANGE UP
HCOV NL63 RNA SPEC QL NAA+PROBE: SIGNIFICANT CHANGE UP
HCOV OC43 RNA SPEC QL NAA+PROBE: SIGNIFICANT CHANGE UP
HCT VFR BLD CALC: 38.4 % — SIGNIFICANT CHANGE UP (ref 34.5–45)
HGB BLD-MCNC: 12.1 G/DL — SIGNIFICANT CHANGE UP (ref 11.5–15.5)
HMPV RNA SPEC QL NAA+PROBE: SIGNIFICANT CHANGE UP
HPIV1 RNA SPEC QL NAA+PROBE: SIGNIFICANT CHANGE UP
HPIV2 RNA SPEC QL NAA+PROBE: SIGNIFICANT CHANGE UP
HPIV3 RNA SPEC QL NAA+PROBE: SIGNIFICANT CHANGE UP
HPIV4 RNA SPEC QL NAA+PROBE: SIGNIFICANT CHANGE UP
HYALINE CASTS # UR AUTO: 2 /LPF — SIGNIFICANT CHANGE UP (ref 0–7)
IANC: 2.53 K/UL — SIGNIFICANT CHANGE UP (ref 1.5–8.5)
KETONES UR-MCNC: NEGATIVE — SIGNIFICANT CHANGE UP
LEUKOCYTE ESTERASE UR-ACNC: NEGATIVE — SIGNIFICANT CHANGE UP
LIDOCAIN IGE QN: 32 U/L — SIGNIFICANT CHANGE UP (ref 7–60)
LYMPHOCYTES # BLD AUTO: 1.54 K/UL — SIGNIFICANT CHANGE UP (ref 1–3.3)
LYMPHOCYTES # BLD AUTO: 30.3 % — SIGNIFICANT CHANGE UP (ref 13–44)
MCHC RBC-ENTMCNC: 22.9 PG — LOW (ref 27–34)
MCHC RBC-ENTMCNC: 31.5 GM/DL — LOW (ref 32–36)
MCV RBC AUTO: 72.6 FL — LOW (ref 80–100)
MONOCYTES # BLD AUTO: 0.18 K/UL — SIGNIFICANT CHANGE UP (ref 0–0.9)
MONOCYTES NFR BLD AUTO: 3.6 % — SIGNIFICANT CHANGE UP (ref 2–14)
NEUTROPHILS # BLD AUTO: 2.59 K/UL — SIGNIFICANT CHANGE UP (ref 1.8–7.4)
NEUTROPHILS NFR BLD AUTO: 50.9 % — SIGNIFICANT CHANGE UP (ref 43–77)
NITRITE UR-MCNC: NEGATIVE — SIGNIFICANT CHANGE UP
PH UR: 8.5 — HIGH (ref 5–8)
PLATELET # BLD AUTO: 312 K/UL — SIGNIFICANT CHANGE UP (ref 150–400)
POTASSIUM SERPL-MCNC: 4.1 MMOL/L — SIGNIFICANT CHANGE UP (ref 3.5–5.3)
POTASSIUM SERPL-SCNC: 4.1 MMOL/L — SIGNIFICANT CHANGE UP (ref 3.5–5.3)
PROT SERPL-MCNC: 8.2 G/DL — SIGNIFICANT CHANGE UP (ref 6–8.3)
PROT UR-MCNC: ABNORMAL
RAPID RVP RESULT: SIGNIFICANT CHANGE UP
RBC # BLD: 5.29 M/UL — HIGH (ref 3.8–5.2)
RBC # FLD: 14.3 % — SIGNIFICANT CHANGE UP (ref 10.3–14.5)
RBC CASTS # UR COMP ASSIST: 4 /HPF — SIGNIFICANT CHANGE UP (ref 0–4)
RSV RNA SPEC QL NAA+PROBE: SIGNIFICANT CHANGE UP
RV+EV RNA SPEC QL NAA+PROBE: SIGNIFICANT CHANGE UP
SARS-COV-2 RNA SPEC QL NAA+PROBE: SIGNIFICANT CHANGE UP
SODIUM SERPL-SCNC: 139 MMOL/L — SIGNIFICANT CHANGE UP (ref 135–145)
SP GR SPEC: 1.03 — HIGH (ref 1.01–1.02)
UROBILINOGEN FLD QL: ABNORMAL
WBC # BLD: 5.09 K/UL — SIGNIFICANT CHANGE UP (ref 3.8–10.5)
WBC # FLD AUTO: 5.09 K/UL — SIGNIFICANT CHANGE UP (ref 3.8–10.5)
WBC UR QL: 5 /HPF — SIGNIFICANT CHANGE UP (ref 0–5)

## 2021-05-09 PROCEDURE — 76705 ECHO EXAM OF ABDOMEN: CPT | Mod: 26

## 2021-05-09 PROCEDURE — 99285 EMERGENCY DEPT VISIT HI MDM: CPT

## 2021-05-09 RX ORDER — ONDANSETRON 8 MG/1
1 TABLET, FILM COATED ORAL
Qty: 9 | Refills: 0
Start: 2021-05-09 | End: 2021-05-11

## 2021-05-09 RX ORDER — SODIUM CHLORIDE 9 MG/ML
1000 INJECTION, SOLUTION INTRAVENOUS ONCE
Refills: 0 | Status: COMPLETED | OUTPATIENT
Start: 2021-05-09 | End: 2021-05-09

## 2021-05-09 RX ADMIN — SODIUM CHLORIDE 1000 MILLILITER(S): 9 INJECTION, SOLUTION INTRAVENOUS at 16:58

## 2021-05-09 NOTE — ED PROVIDER NOTE - PATIENT PORTAL LINK FT
You can access the FollowMyHealth Patient Portal offered by [facility] by registering at the following website: [email or web address]. By joining [employer]’s FollowMyHealth portal, you will also be able to view your health information using other applications (apps) compatible with our system.

## 2021-05-09 NOTE — ED PROVIDER NOTE - ATTENDING CONTRIBUTION TO CARE
GEN: no acute respiratory distress. nontoxic, speaking comfortably in full sentences, ambulating with steady gait.  HEENT: NCAT. face symmetrical. PERRL 4mm, EOMI, MMM, oropharynx wnl.  Neck: no JVD, trachea midline, no LAD  CV: RRR. +S1S2, no murmur. 2+ pulses in 4 extremities  Chest: CTA B/l. no wheezing, rales, rhonchi. no retractions. good air movement.   ABD: +BS, soft, non distended, mild epigastric tenderness. No guarding/rebound. neg diehl's sign  : no cva or suprapubic tenderness  MSK: No clubbing, cyanosis, edema. FROM of all extremities. no tenderness to palpation. No midline or paraspinal tenderness.  Neuro: AAOX3. Sensation intact, motor 5/5 throughout.   SKIN: No erythema, lesions or rash   34 yo f past medical history endometriosis, s/p right cystectomy, bilateral tubal ligation presents with n/v/d epigastric pain x 4 days. denies fever chills, cp, sob, back pain, urinary symptoms. irreg periods, just recently restarted ocp for regulation. exam as above. assess for biliary etiology, low suspicion for appe. plan: us, labs, symptom relief prn, reassess.

## 2021-05-09 NOTE — ED PROVIDER NOTE - OBJECTIVE STATEMENT
36 y/o F with PMHx of endometriosis with post b/l tubal ligation presents to the ED with abdominal pain and n/v/d x 4 days. Began after eating new food at work. No prior episodes before. Afebrile. Denies cough, chills. Emesis and diarrhea are nonbloody. Also denies chest pain, SOB, back pain, dysuria, hematuria. LMP in April unclear date.

## 2021-05-09 NOTE — ED PROVIDER NOTE - CLINICAL SUMMARY MEDICAL DECISION MAKING FREE TEXT BOX
34 y/o F with PMHx of endometriosis here for abdominal pain and n/v/d x 4 days. Able to tolerate PO however, food worsens her pain. Tender to epigastric and RUQ area. Gastroenteritis vs viral syndrome. However will eval with RUQ US and labs for biliary pathology.

## 2021-05-09 NOTE — ED PROVIDER NOTE - PHYSICAL EXAMINATION
Vitals: I have reviewed the patients vital signs.  General: well appearing, well nourished, no acute distress  HEENT: atraumatic, normocephalic, airway patent, EOMI and appropriate tracking  Neck: no JVD, no tracheal deviation  Chest/Lungs: no trauma, symmetric chest rise, speaking in complete sentences  Heart: Regular rate, regular rhythm, skin well perfused  Abdomen: Soft, mild epigastric and RUQ tenderness, no rebound or guarding  Neuro: A+Ox3, ambulating without difficulty, non dysarthric speech  Eyes: EOMI, no conjunctival injection  MSK: all limbs at baseline strength, no wasting or atrophy,   Skin: no bleeding, no cyanosis, no jaundice, no new emergent lesions

## 2021-05-09 NOTE — ED PROVIDER NOTE - NS ED ROS FT
Constitutional: (-) fever (+) vomiting  Eyes/ENT: (-) vision changes, (-) hearing changes  Cardiovascular: (-) chest pain, (-) wheezing  Respiratory: (-) cough, (-) shortness of breath  Gastrointestinal: (+) vomiting, (+) diarrhea, (+) abdominal pain  : (-) dysuria   Musculoskeletal: (-) back pain  Integumentary: (-) rash, (-) edema  Neurological: (-)loc  Allergic/Immunologic: (-) pruritus  Endocrine: No history of thyroid disease

## 2021-05-09 NOTE — ED ADULT TRIAGE NOTE - CHIEF COMPLAINT QUOTE
Pt states that she has had n/v/d since Thursday, denies fever chills or cough, denies sick contacts.  Pt denies past medical history

## 2021-05-09 NOTE — ED PROVIDER NOTE - NSFOLLOWUPINSTRUCTIONS_ED_ALL_ED_FT
You came to the ER with nausea, vomiting, diarrhea, abdominal pain. We evaluated your blood and did an ultrasound and did not find an exact cause. At this time you can manage this conservatively at home. We have sent Zofran to your pharmacy to help with the nausea, please take it as needed for symptoms    Please return to the ER if you are unable to eat or drink, pass out, have worsening symptoms, chest pain, difficulty breathing, or for any concerns you would like evaluated.

## 2022-03-15 PROBLEM — N80.9 ENDOMETRIOSIS, UNSPECIFIED: Chronic | Status: ACTIVE | Noted: 2021-05-09

## 2022-04-28 ENCOUNTER — TRANSCRIPTION ENCOUNTER (OUTPATIENT)
Age: 37
End: 2022-04-28

## 2022-04-28 VITALS
RESPIRATION RATE: 16 BRPM | HEART RATE: 67 BPM | HEIGHT: 65 IN | SYSTOLIC BLOOD PRESSURE: 105 MMHG | DIASTOLIC BLOOD PRESSURE: 67 MMHG | TEMPERATURE: 98 F | OXYGEN SATURATION: 100 % | WEIGHT: 154.32 LBS

## 2022-04-28 NOTE — ASU PATIENT PROFILE, ADULT - FALL HARM RISK - UNIVERSAL INTERVENTIONS
Bed in lowest position, wheels locked, appropriate side rails in place/Call bell, personal items and telephone in reach/Instruct patient to call for assistance before getting out of bed or chair/Non-slip footwear when patient is out of bed/[place] to call system/Physically safe environment - no spills, clutter or unnecessary equipment/Purposeful Proactive Rounding/Room/bathroom lighting operational, light cord in reach

## 2022-04-29 ENCOUNTER — INPATIENT (INPATIENT)
Facility: HOSPITAL | Age: 37
LOS: 6 days | Discharge: HOME | DRG: 742 | End: 2022-05-06
Attending: OBSTETRICS & GYNECOLOGY | Admitting: OBSTETRICS & GYNECOLOGY
Payer: COMMERCIAL

## 2022-04-29 ENCOUNTER — TRANSCRIPTION ENCOUNTER (OUTPATIENT)
Age: 37
End: 2022-04-29

## 2022-04-29 DIAGNOSIS — Z98.890 OTHER SPECIFIED POSTPROCEDURAL STATES: Chronic | ICD-10-CM

## 2022-04-29 DIAGNOSIS — N80.1 ENDOMETRIOSIS OF OVARY: ICD-10-CM

## 2022-04-29 DIAGNOSIS — K66.0 PERITONEAL ADHESIONS (POSTPROCEDURAL) (POSTINFECTION): ICD-10-CM

## 2022-04-29 DIAGNOSIS — K59.00 CONSTIPATION, UNSPECIFIED: ICD-10-CM

## 2022-04-29 DIAGNOSIS — Z90.721 ACQUIRED ABSENCE OF OVARIES, UNILATERAL: Chronic | ICD-10-CM

## 2022-04-29 DIAGNOSIS — K91.71 ACCIDENTAL PUNCTURE AND LACERATION OF A DIGESTIVE SYSTEM ORGAN OR STRUCTURE DURING A DIGESTIVE SYSTEM PROCEDURE: ICD-10-CM

## 2022-04-29 DIAGNOSIS — N80.3 ENDOMETRIOSIS OF PELVIC PERITONEUM: ICD-10-CM

## 2022-04-29 DIAGNOSIS — Z79.899 OTHER LONG TERM (CURRENT) DRUG THERAPY: ICD-10-CM

## 2022-04-29 DIAGNOSIS — R50.82 POSTPROCEDURAL FEVER: ICD-10-CM

## 2022-04-29 DIAGNOSIS — D25.9 LEIOMYOMA OF UTERUS, UNSPECIFIED: ICD-10-CM

## 2022-04-29 DIAGNOSIS — R07.89 OTHER CHEST PAIN: ICD-10-CM

## 2022-04-29 DIAGNOSIS — N80.4 ENDOMETRIOSIS OF RECTOVAGINAL SEPTUM AND VAGINA: ICD-10-CM

## 2022-04-29 DIAGNOSIS — N80.0 ENDOMETRIOSIS OF UTERUS: ICD-10-CM

## 2022-04-29 DIAGNOSIS — N80.5 ENDOMETRIOSIS OF INTESTINE: ICD-10-CM

## 2022-04-29 DIAGNOSIS — R19.7 DIARRHEA, UNSPECIFIED: ICD-10-CM

## 2022-04-29 LAB
BLD GP AB SCN SERPL QL: NEGATIVE — SIGNIFICANT CHANGE UP
RH IG SCN BLD-IMP: POSITIVE — SIGNIFICANT CHANGE UP

## 2022-04-29 PROCEDURE — 93010 ELECTROCARDIOGRAM REPORT: CPT

## 2022-04-29 PROCEDURE — 88305 TISSUE EXAM BY PATHOLOGIST: CPT | Mod: 26

## 2022-04-29 DEVICE — SURGICEL POWDER 3 GRAMS: Type: IMPLANTABLE DEVICE | Status: FUNCTIONAL

## 2022-04-29 RX ORDER — SODIUM CHLORIDE 9 MG/ML
1000 INJECTION, SOLUTION INTRAVENOUS
Refills: 0 | Status: DISCONTINUED | OUTPATIENT
Start: 2022-04-29 | End: 2022-04-30

## 2022-04-29 RX ORDER — ACETAMINOPHEN 500 MG
1000 TABLET ORAL EVERY 6 HOURS
Refills: 0 | Status: DISCONTINUED | OUTPATIENT
Start: 2022-04-29 | End: 2022-05-04

## 2022-04-29 RX ORDER — KETOROLAC TROMETHAMINE 30 MG/ML
30 SYRINGE (ML) INJECTION EVERY 8 HOURS
Refills: 0 | Status: DISCONTINUED | OUTPATIENT
Start: 2022-04-29 | End: 2022-04-30

## 2022-04-29 RX ORDER — SIMETHICONE 80 MG/1
80 TABLET, CHEWABLE ORAL EVERY 6 HOURS
Refills: 0 | Status: DISCONTINUED | OUTPATIENT
Start: 2022-04-29 | End: 2022-05-06

## 2022-04-29 RX ORDER — PHENAZOPYRIDINE HCL 100 MG
200 TABLET ORAL ONCE
Refills: 0 | Status: COMPLETED | OUTPATIENT
Start: 2022-04-29 | End: 2022-04-29

## 2022-04-29 RX ORDER — IBUPROFEN 200 MG
600 TABLET ORAL EVERY 6 HOURS
Refills: 0 | Status: DISCONTINUED | OUTPATIENT
Start: 2022-04-29 | End: 2022-05-04

## 2022-04-29 RX ORDER — ELAGOLIX 200 MG/1
1 TABLET, FILM COATED ORAL
Qty: 0 | Refills: 0 | DISCHARGE

## 2022-04-29 RX ORDER — POLYETHYLENE GLYCOL 3350 17 G/17G
17 POWDER, FOR SOLUTION ORAL EVERY 24 HOURS
Refills: 0 | Status: DISCONTINUED | OUTPATIENT
Start: 2022-04-30 | End: 2022-04-30

## 2022-04-29 RX ORDER — HEPARIN SODIUM 5000 [USP'U]/ML
5000 INJECTION INTRAVENOUS; SUBCUTANEOUS ONCE
Refills: 0 | Status: COMPLETED | OUTPATIENT
Start: 2022-04-29 | End: 2022-04-29

## 2022-04-29 RX ORDER — CELECOXIB 200 MG/1
400 CAPSULE ORAL ONCE
Refills: 0 | Status: COMPLETED | OUTPATIENT
Start: 2022-04-29 | End: 2022-04-29

## 2022-04-29 RX ORDER — OXYCODONE HYDROCHLORIDE 5 MG/1
5 TABLET ORAL
Refills: 0 | Status: DISCONTINUED | OUTPATIENT
Start: 2022-04-29 | End: 2022-05-06

## 2022-04-29 RX ORDER — ACETAMINOPHEN 500 MG
1000 TABLET ORAL ONCE
Refills: 0 | Status: COMPLETED | OUTPATIENT
Start: 2022-04-29 | End: 2022-04-29

## 2022-04-29 RX ORDER — HYDROMORPHONE HYDROCHLORIDE 2 MG/ML
0.5 INJECTION INTRAMUSCULAR; INTRAVENOUS; SUBCUTANEOUS
Refills: 0 | Status: DISCONTINUED | OUTPATIENT
Start: 2022-04-29 | End: 2022-05-04

## 2022-04-29 RX ORDER — OXYCODONE HYDROCHLORIDE 5 MG/1
10 TABLET ORAL EVERY 4 HOURS
Refills: 0 | Status: DISCONTINUED | OUTPATIENT
Start: 2022-04-29 | End: 2022-05-06

## 2022-04-29 RX ORDER — ONDANSETRON 8 MG/1
8 TABLET, FILM COATED ORAL EVERY 8 HOURS
Refills: 0 | Status: DISCONTINUED | OUTPATIENT
Start: 2022-04-29 | End: 2022-05-06

## 2022-04-29 RX ORDER — GABAPENTIN 400 MG/1
300 CAPSULE ORAL ONCE
Refills: 0 | Status: COMPLETED | OUTPATIENT
Start: 2022-04-29 | End: 2022-04-29

## 2022-04-29 RX ADMIN — Medication 30 MILLIGRAM(S): at 22:00

## 2022-04-29 RX ADMIN — SODIUM CHLORIDE 100 MILLILITER(S): 9 INJECTION, SOLUTION INTRAVENOUS at 21:21

## 2022-04-29 RX ADMIN — ONDANSETRON 8 MILLIGRAM(S): 8 TABLET, FILM COATED ORAL at 21:21

## 2022-04-29 RX ADMIN — HYDROMORPHONE HYDROCHLORIDE 0.5 MILLIGRAM(S): 2 INJECTION INTRAMUSCULAR; INTRAVENOUS; SUBCUTANEOUS at 16:58

## 2022-04-29 RX ADMIN — Medication 1000 MILLIGRAM(S): at 22:10

## 2022-04-29 RX ADMIN — OXYCODONE HYDROCHLORIDE 10 MILLIGRAM(S): 5 TABLET ORAL at 21:00

## 2022-04-29 RX ADMIN — CELECOXIB 400 MILLIGRAM(S): 200 CAPSULE ORAL at 06:32

## 2022-04-29 RX ADMIN — HYDROMORPHONE HYDROCHLORIDE 0.5 MILLIGRAM(S): 2 INJECTION INTRAMUSCULAR; INTRAVENOUS; SUBCUTANEOUS at 15:47

## 2022-04-29 RX ADMIN — Medication 200 MILLIGRAM(S): at 07:09

## 2022-04-29 RX ADMIN — Medication 30 MILLIGRAM(S): at 16:58

## 2022-04-29 RX ADMIN — HYDROMORPHONE HYDROCHLORIDE 0.5 MILLIGRAM(S): 2 INJECTION INTRAMUSCULAR; INTRAVENOUS; SUBCUTANEOUS at 14:05

## 2022-04-29 RX ADMIN — Medication 30 MILLIGRAM(S): at 15:46

## 2022-04-29 RX ADMIN — OXYCODONE HYDROCHLORIDE 10 MILLIGRAM(S): 5 TABLET ORAL at 20:08

## 2022-04-29 RX ADMIN — Medication 1000 MILLIGRAM(S): at 06:31

## 2022-04-29 RX ADMIN — Medication 1000 MILLIGRAM(S): at 21:22

## 2022-04-29 RX ADMIN — HEPARIN SODIUM 5000 UNIT(S): 5000 INJECTION INTRAVENOUS; SUBCUTANEOUS at 06:37

## 2022-04-29 RX ADMIN — GABAPENTIN 300 MILLIGRAM(S): 400 CAPSULE ORAL at 06:31

## 2022-04-29 RX ADMIN — SIMETHICONE 80 MILLIGRAM(S): 80 TABLET, CHEWABLE ORAL at 21:22

## 2022-04-29 RX ADMIN — Medication 30 MILLIGRAM(S): at 21:22

## 2022-04-29 NOTE — BRIEF OPERATIVE NOTE - OPERATION/FINDINGS
Procedure: laparoscopic enterolysis and shave excision of rectal endometriosis    Description: Abdomen entered using optiview port on L side abdomen through prior incision. Significant endometriosis found on L pelvic sidewall near ovary. Thick plaque between uterus and rectum sharply excised with minimal use of cautery. Two rectal wall defects down to the submucosa (one in lower rectum, one in upper) repaired primarily with interrupted 3-0 silk. Methylene blue injected via rectal tube showed no evidence of leak. Case is ongoing; see GYN note for further details and final I/Os. Procedure: laparoscopic enterolysis and shave excision of rectal endometriosis    Description: Abdomen entered using optiview port on L side abdomen through prior incision. Significant endometriosis found on L pelvic sidewall near ovary. Thick plaque between uterus and rectum sharply excised with minimal use of cautery. Lower-to-mid rectal de-muscularization of 2.5cm area repaired primarily with interrupted 3-0 silk. De-serosalized area on upper rectum also repaired.  Methylene blue injected via rectal tube showed no evidence of leak. Case is ongoing; see GYN note for further details and final I/Os.

## 2022-04-29 NOTE — PROGRESS NOTE ADULT - SUBJECTIVE AND OBJECTIVE BOX
Surgery Post-Op Note      Procedure: Laparoscopic enterolysis  Excision, endometriosis, laparoscopic      Surgeon: Dr. Marroquin    Subjective: Patient complains of abdominal pain, improved with analgesia Denies nausea or vomiting, chest pain or dyspnea      Vital Signs Last 24 Hrs  T(C): 37.1 (29 Apr 2022 13:39), Max: 37.1 (29 Apr 2022 13:39)  T(F): 98.8 (29 Apr 2022 13:39), Max: 98.8 (29 Apr 2022 13:39)  HR: 91 (29 Apr 2022 15:45) (71 - 108)  BP: 116/59 (29 Apr 2022 15:45) (108/55 - 123/59)  BP(mean): 83 (29 Apr 2022 15:45) (75 - 83)  RR: 16 (29 Apr 2022 15:45) (14 - 24)  SpO2: 100% (29 Apr 2022 15:45) (100% - 100%)    Physical Exam:  General: NAD, resting comfortably in bed  Pulmonary: Nonlabored breathing, no respiratory distress  Cardiovascular: NSR  Abdominal: soft, mild tenderness appropriate for condition, no rebound or guarding, port sited cdi  Extremities: WWP, normal strength  Neuro: A/O x 3, CNs II-XII grossly intact, no focal deficits, normal sensation  Pulses: palpable distal pulses    ABO Interpretation: A (04-29 @ 07:48)        Radiology and Additional Studies:    Assessment:36y Female s/p above procedure    Plan:  Pain/nausea control PRN  Incentive spirometer/OOB/AAT  IVF, Diet: NPO  AM labs  Plan per GYN team

## 2022-04-29 NOTE — BRIEF OPERATIVE NOTE - OPERATION/FINDINGS
Multiple adhesions noticed while entering the abdomen, fibroid uterus adherent to the bladder and the rectum, multiple endometriosis lesions, s/p b/l salpingectomy. Extensive endometriosis excision done, lysis of adhesions, Abdomen entered using direct visualization in the LUQ. Multiple adhesions noticed while entering the abdomen, fibroid uterus adherent to the bladder and the rectum, multiple endometriosis lesions, s/p b/l salpingectomy. Extensive endometriosis excision done from the pelvic wall, uterosacral ligament, rectal serosa, uterine serosa, lysis of adhesions, ureterolysis, resection of ruminant of right ovary, cystoscopy and D&C. Abdomen entered using direct visualization in the LUQ. Multiple adhesions noticed while entering the abdomen, fibroid uterus adherent to the bladder and the rectum, multiple endometriosis lesions, s/p b/l salpingectomy. Extensive endometriosis excision done from the pelvic wall, uterosacral ligament, rectal serosa, uterine serosa, lysis of adhesions, ureterolysis, resection of ruminant of right ovary, myomectomy of one subserosal posterior fibroid 2 cm, cystoscopy and D&C. Abdomen entered using direct visualization in the LUQ. Multiple adhesions noticed while entering the abdomen, fibroid uterus adherent to the bladder and the rectum, multiple endometriosis lesions, s/p b/l salpingectomy. Extensive endometriosis excision done from the pelvic wall, uterosacral ligament, rectal serosa, uterine serosa, lysis of adhesions, ureterolysis, resection of ruminant of right ovary, myomectomy of one subserosal posterior fibroid 2 cm, cystoscopy and D&C. 2 small full thickness rectal defects repaired by general surgery

## 2022-04-29 NOTE — BRIEF OPERATIVE NOTE - SPECIMENS
pelvic wall, uterosacral ligament, rectal serosa, uterine serosa, ruminant of right ovary, EMC. pelvic wall, uterosacral ligament, rectal serosa, uterine serosa, ruminant of right ovary, 1 fibroid,  EMC.

## 2022-04-29 NOTE — H&P ADULT - NSICDXPASTSURGICALHX_GEN_ALL_CORE_FT
PAST SURGICAL HISTORY:  History of salpingostomy bilateral, 2018    S/P right oophorectomy Partial 2010

## 2022-04-29 NOTE — H&P ADULT - HISTORY OF PRESENT ILLNESS
Patient is a 36y y.o. [id number] for scheduled r/a Scheduled endometriosis excision. Patient has a hx of parital right oopherectomy Denies chest pain, palpitations, SOB, n/v, diarrhea, constipation.    Ob hx: _  Gyn hx: _  PMH: Denies  Meds: Denies  PSH: Denies  Allergies: Denies    Physical Exam  Gen: Well-appearing. NAD.  CV: RRR, no murmurs  Resp: Breathing comfortably on RA. Lungs CTAB  Abd: Soft, non-tender, non-distended. _wk size uterus.  Ext: No calf tenderness   Patient is a 36y y.o. G0 presents for scheduled r/a Scheduled endometriosis excision. Patient has a hx of parital right oopherectomy 2010 due to endometrioma, b/l salpingectomy 2018. Denies chest pain, palpitations, SOB, n/v, diarrhea, constipation.    Ob hx: G0  Gyn hx: Endometriosis,  parital right oopherectomy 2010 due to endometrioma, b/l salpingectomy 2018.   PMH: Denies  Meds: Denies  PSH: Denies  Allergies: Denies    Physical Exam  Gen: Well-appearing. NAD.  Resp: Breathing comfortably on RA.  Abd: Soft, non-tender, non-distended. _wk size uterus.  Ext: No calf tenderness

## 2022-04-29 NOTE — BRIEF OPERATIVE NOTE - NSICDXBRIEFPROCEDURE_GEN_ALL_CORE_FT
PROCEDURES:  Laparoscopic enterolysis 29-Apr-2022 11:55:24  Pop Souza  Excision, endometriosis, laparoscopic 29-Apr-2022 11:58:19  Pop Souza  
PROCEDURES:  Myomectomy, uterus, robot-assisted, laparoscopic, with cystoscopy 29-Apr-2022 14:35:11  Jerilyn Villalpando

## 2022-04-29 NOTE — H&P ADULT - ASSESSMENT
36y G0 with PMH of endometriosis for scheduled r/a endo excision, admitted for pain control and postoperative monitoring.     Plan   - NPO, IVF  - _ERAS protocol  - Consents signed       36y G0 with PMH of endometriosis for scheduled r/a endo excision, admitted for pain control and postoperative monitoring.     Plan   - NPO, IVF  - _ERAS protocol  - Consents signed  - Starting Hb 11.9  - Starting Cr 0.80

## 2022-04-30 LAB
ANION GAP SERPL CALC-SCNC: 8 MMOL/L — SIGNIFICANT CHANGE UP (ref 5–17)
BASOPHILS # BLD AUTO: 0 K/UL — SIGNIFICANT CHANGE UP (ref 0–0.2)
BASOPHILS NFR BLD AUTO: 0 % — SIGNIFICANT CHANGE UP (ref 0–2)
BUN SERPL-MCNC: 9 MG/DL — SIGNIFICANT CHANGE UP (ref 7–23)
CALCIUM SERPL-MCNC: 9 MG/DL — SIGNIFICANT CHANGE UP (ref 8.4–10.5)
CHLORIDE SERPL-SCNC: 104 MMOL/L — SIGNIFICANT CHANGE UP (ref 96–108)
CK MB CFR SERPL CALC: 7.1 NG/ML — HIGH (ref 0–6.7)
CK SERPL-CCNC: 1062 U/L — HIGH (ref 25–170)
CO2 SERPL-SCNC: 27 MMOL/L — SIGNIFICANT CHANGE UP (ref 22–31)
CREAT SERPL-MCNC: 0.82 MG/DL — SIGNIFICANT CHANGE UP (ref 0.5–1.3)
EGFR: 94 ML/MIN/1.73M2 — SIGNIFICANT CHANGE UP
EOSINOPHIL # BLD AUTO: 0 K/UL — SIGNIFICANT CHANGE UP (ref 0–0.5)
EOSINOPHIL NFR BLD AUTO: 0 % — SIGNIFICANT CHANGE UP (ref 0–6)
GIANT PLATELETS BLD QL SMEAR: PRESENT — SIGNIFICANT CHANGE UP
GLUCOSE SERPL-MCNC: 94 MG/DL — SIGNIFICANT CHANGE UP (ref 70–99)
HCT VFR BLD CALC: 33 % — LOW (ref 34.5–45)
HGB BLD-MCNC: 10.4 G/DL — LOW (ref 11.5–15.5)
HYPOCHROMIA BLD QL: SIGNIFICANT CHANGE UP
LYMPHOCYTES # BLD AUTO: 1.15 K/UL — SIGNIFICANT CHANGE UP (ref 1–3.3)
LYMPHOCYTES # BLD AUTO: 18.2 % — SIGNIFICANT CHANGE UP (ref 13–44)
MAGNESIUM SERPL-MCNC: 1.7 MG/DL — SIGNIFICANT CHANGE UP (ref 1.6–2.6)
MANUAL SMEAR VERIFICATION: SIGNIFICANT CHANGE UP
MCHC RBC-ENTMCNC: 23.5 PG — LOW (ref 27–34)
MCHC RBC-ENTMCNC: 31.5 GM/DL — LOW (ref 32–36)
MCV RBC AUTO: 74.5 FL — LOW (ref 80–100)
MICROCYTES BLD QL: SIGNIFICANT CHANGE UP
MONOCYTES # BLD AUTO: 0.29 K/UL — SIGNIFICANT CHANGE UP (ref 0–0.9)
MONOCYTES NFR BLD AUTO: 4.5 % — SIGNIFICANT CHANGE UP (ref 2–14)
NEUTROPHILS # BLD AUTO: 4.9 K/UL — SIGNIFICANT CHANGE UP (ref 1.8–7.4)
NEUTROPHILS NFR BLD AUTO: 77.3 % — HIGH (ref 43–77)
OVALOCYTES BLD QL SMEAR: SLIGHT — SIGNIFICANT CHANGE UP
PHOSPHATE SERPL-MCNC: 4 MG/DL — SIGNIFICANT CHANGE UP (ref 2.5–4.5)
PLAT MORPH BLD: NORMAL — SIGNIFICANT CHANGE UP
PLATELET # BLD AUTO: 239 K/UL — SIGNIFICANT CHANGE UP (ref 150–400)
POIKILOCYTOSIS BLD QL AUTO: SLIGHT — SIGNIFICANT CHANGE UP
POTASSIUM SERPL-MCNC: 3.8 MMOL/L — SIGNIFICANT CHANGE UP (ref 3.5–5.3)
POTASSIUM SERPL-SCNC: 3.8 MMOL/L — SIGNIFICANT CHANGE UP (ref 3.5–5.3)
RBC # BLD: 4.43 M/UL — SIGNIFICANT CHANGE UP (ref 3.8–5.2)
RBC # FLD: 14.6 % — HIGH (ref 10.3–14.5)
RBC BLD AUTO: ABNORMAL
SMUDGE CELLS # BLD: PRESENT — SIGNIFICANT CHANGE UP
SODIUM SERPL-SCNC: 139 MMOL/L — SIGNIFICANT CHANGE UP (ref 135–145)
TARGETS BLD QL SMEAR: SLIGHT — SIGNIFICANT CHANGE UP
TROPONIN T SERPL-MCNC: 0.01 NG/ML — SIGNIFICANT CHANGE UP (ref 0–0.01)
TROPONIN T SERPL-MCNC: 0.01 NG/ML — SIGNIFICANT CHANGE UP (ref 0–0.01)
WBC # BLD: 6.34 K/UL — SIGNIFICANT CHANGE UP (ref 3.8–10.5)
WBC # FLD AUTO: 6.34 K/UL — SIGNIFICANT CHANGE UP (ref 3.8–10.5)

## 2022-04-30 PROCEDURE — 99253 IP/OBS CNSLTJ NEW/EST LOW 45: CPT | Mod: GC

## 2022-04-30 RX ORDER — POTASSIUM CHLORIDE 20 MEQ
20 PACKET (EA) ORAL ONCE
Refills: 0 | Status: COMPLETED | OUTPATIENT
Start: 2022-04-30 | End: 2022-04-30

## 2022-04-30 RX ORDER — ENOXAPARIN SODIUM 100 MG/ML
40 INJECTION SUBCUTANEOUS EVERY 24 HOURS
Refills: 0 | Status: DISCONTINUED | OUTPATIENT
Start: 2022-04-30 | End: 2022-05-06

## 2022-04-30 RX ORDER — MAGNESIUM SULFATE 500 MG/ML
2 VIAL (ML) INJECTION ONCE
Refills: 0 | Status: COMPLETED | OUTPATIENT
Start: 2022-04-30 | End: 2022-04-30

## 2022-04-30 RX ADMIN — Medication 30 MILLIGRAM(S): at 13:57

## 2022-04-30 RX ADMIN — Medication 30 MILLIGRAM(S): at 05:48

## 2022-04-30 RX ADMIN — Medication 1000 MILLIGRAM(S): at 15:27

## 2022-04-30 RX ADMIN — SODIUM CHLORIDE 100 MILLILITER(S): 9 INJECTION, SOLUTION INTRAVENOUS at 03:17

## 2022-04-30 RX ADMIN — Medication 1000 MILLIGRAM(S): at 21:23

## 2022-04-30 RX ADMIN — SIMETHICONE 80 MILLIGRAM(S): 80 TABLET, CHEWABLE ORAL at 09:37

## 2022-04-30 RX ADMIN — SIMETHICONE 80 MILLIGRAM(S): 80 TABLET, CHEWABLE ORAL at 15:28

## 2022-04-30 RX ADMIN — OXYCODONE HYDROCHLORIDE 10 MILLIGRAM(S): 5 TABLET ORAL at 07:25

## 2022-04-30 RX ADMIN — SIMETHICONE 80 MILLIGRAM(S): 80 TABLET, CHEWABLE ORAL at 21:23

## 2022-04-30 RX ADMIN — Medication 1000 MILLIGRAM(S): at 15:37

## 2022-04-30 RX ADMIN — Medication 1000 MILLIGRAM(S): at 03:17

## 2022-04-30 RX ADMIN — Medication 25 GRAM(S): at 09:36

## 2022-04-30 RX ADMIN — POLYETHYLENE GLYCOL 3350 17 GRAM(S): 17 POWDER, FOR SOLUTION ORAL at 15:28

## 2022-04-30 RX ADMIN — Medication 1000 MILLIGRAM(S): at 03:52

## 2022-04-30 RX ADMIN — Medication 1000 MILLIGRAM(S): at 22:15

## 2022-04-30 RX ADMIN — SODIUM CHLORIDE 100 MILLILITER(S): 9 INJECTION, SOLUTION INTRAVENOUS at 04:28

## 2022-04-30 RX ADMIN — SIMETHICONE 80 MILLIGRAM(S): 80 TABLET, CHEWABLE ORAL at 03:17

## 2022-04-30 RX ADMIN — ENOXAPARIN SODIUM 40 MILLIGRAM(S): 100 INJECTION SUBCUTANEOUS at 09:37

## 2022-04-30 RX ADMIN — Medication 30 MILLIGRAM(S): at 04:28

## 2022-04-30 RX ADMIN — Medication 1000 MILLIGRAM(S): at 09:37

## 2022-04-30 RX ADMIN — Medication 1000 MILLIGRAM(S): at 09:42

## 2022-04-30 RX ADMIN — Medication 20 MILLIEQUIVALENT(S): at 13:58

## 2022-04-30 RX ADMIN — Medication 30 MILLIGRAM(S): at 14:16

## 2022-04-30 RX ADMIN — OXYCODONE HYDROCHLORIDE 10 MILLIGRAM(S): 5 TABLET ORAL at 08:15

## 2022-04-30 NOTE — PROGRESS NOTE ADULT - SUBJECTIVE AND OBJECTIVE BOX
SUBJECTIVE: Patient seen and examined bedside. Pt reports feeling well this morning. States abdominal pain is well controlled. Denies nausea or vomiting. Tolerating clear liquid diet. Denies flatus or BMs.         Vital Signs Last 24 Hrs  T(C): 36.9 (30 Apr 2022 05:21), Max: 37.1 (29 Apr 2022 13:39)  T(F): 98.5 (30 Apr 2022 05:21), Max: 98.8 (29 Apr 2022 13:39)  HR: 71 (30 Apr 2022 05:21) (71 - 108)  BP: 110/71 (30 Apr 2022 05:21) (105/54 - 125/76)  BP(mean): 77 (29 Apr 2022 16:30) (75 - 84)  RR: 19 (30 Apr 2022 05:21) (14 - 24)  SpO2: 96% (30 Apr 2022 05:21) (96% - 100%)  I&O's Detail    29 Apr 2022 07:01  -  30 Apr 2022 07:00  --------------------------------------------------------  IN:    Lactated Ringers: 1200 mL  Total IN: 1200 mL    OUT:    Indwelling Catheter - Urethral (mL): 2070 mL  Total OUT: 2070 mL    Total NET: -870 mL          General: NAD, resting comfortably in bed  C/V: NSR  Pulm: Nonlabored breathing, no respiratory distress  Abd: soft, mild tenderness appropriate for condition, no rebound or guarding, port sited cdi  Extrem: WWP, no edema, SCDs in place        LABS:                        10.4   6.34  )-----------( 239      ( 30 Apr 2022 06:31 )             33.0     04-30    139  |  104  |  9   ----------------------------<  94  3.8   |  27  |  0.82    Ca    9.0      30 Apr 2022 06:31  Phos  4.0     04-30  Mg     1.7     04-30            RADIOLOGY & ADDITIONAL STUDIES:   SUBJECTIVE: Patient seen and examined bedside. Pt reports feeling well this morning. States she has mild abdominal pain and intermittent nausea. Denies emesis. Denies flatus or BMs.         Vital Signs Last 24 Hrs  T(C): 36.9 (30 Apr 2022 05:21), Max: 37.1 (29 Apr 2022 13:39)  T(F): 98.5 (30 Apr 2022 05:21), Max: 98.8 (29 Apr 2022 13:39)  HR: 71 (30 Apr 2022 05:21) (71 - 108)  BP: 110/71 (30 Apr 2022 05:21) (105/54 - 125/76)  BP(mean): 77 (29 Apr 2022 16:30) (75 - 84)  RR: 19 (30 Apr 2022 05:21) (14 - 24)  SpO2: 96% (30 Apr 2022 05:21) (96% - 100%)  I&O's Detail    29 Apr 2022 07:01  -  30 Apr 2022 07:00  --------------------------------------------------------  IN:    Lactated Ringers: 1200 mL  Total IN: 1200 mL    OUT:    Indwelling Catheter - Urethral (mL): 2070 mL  Total OUT: 2070 mL    Total NET: -870 mL          General: NAD, resting comfortably in bed  C/V: NSR  Pulm: Nonlabored breathing, no respiratory distress  Abd: soft, mildly distended, mild incisional TTP, no rebound or guarding, port sited cdi  Extrem: WWP, no edema, SCDs in place        LABS:                        10.4   6.34  )-----------( 239      ( 30 Apr 2022 06:31 )             33.0     04-30    139  |  104  |  9   ----------------------------<  94  3.8   |  27  |  0.82    Ca    9.0      30 Apr 2022 06:31  Phos  4.0     04-30  Mg     1.7     04-30            RADIOLOGY & ADDITIONAL STUDIES:

## 2022-04-30 NOTE — PROGRESS NOTE ADULT - ASSESSMENT
36 y.o. otherwise healthy female pt. Now POD1 from r/a endometriosis excision, myomectomy, cysto, D+C, 2 small full thickness rectal defects repaired by general surgery.     Plan:  Miralax starting 4/30  Rest of care per primary team 36 y.o. otherwise healthy female pt. Now POD1 from r/a endometriosis excision, myomectomy, cysto, D+C, 2 small full thickness rectal defects repaired by general surgery. Pt reporting abdominal distension and nausea post-operatively.     Plan:  Keep NPO until ROBF  Hold Miralax until ROBF  Rest of care per primary team.

## 2022-04-30 NOTE — PROGRESS NOTE ADULT - SUBJECTIVE AND OBJECTIVE BOX
GYN Progress Note    Patient seen at bedside.  Pain controlled overnight  Tolerating clears.  Not oob yet.  medellin in place.  no flatus yet.    Denies CP, palpitations, SOB, fever, chills, nausea, vomiting.    Vital Signs Last 24 Hrs  T(C): 36.8 (30 Apr 2022 08:26), Max: 37.1 (29 Apr 2022 13:39)  T(F): 98.3 (30 Apr 2022 08:26), Max: 98.8 (29 Apr 2022 13:39)  HR: 67 (30 Apr 2022 08:26) (67 - 108)  BP: 110/61 (30 Apr 2022 08:26) (105/54 - 125/76)  BP(mean): 77 (29 Apr 2022 16:30) (75 - 84)  RR: 17 (30 Apr 2022 08:26) (14 - 24)  SpO2: 97% (30 Apr 2022 08:26) (96% - 100%)    Physical Exam:  Gen: No Acute Distress  GI: soft, appropriately tender, mildly distended, normoactive BS, no rebound, no guarding.  Incision C/D/I  Ext: SCDs in place, wnl    I&O's Summary    29 Apr 2022 07:01  -  30 Apr 2022 07:00  --------------------------------------------------------  IN: 1200 mL / OUT: 2070 mL / NET: -870 mL    30 Apr 2022 07:01  -  30 Apr 2022 11:26  --------------------------------------------------------  IN: 450 mL / OUT: 300 mL / NET: 150 mL      MEDICATIONS  (STANDING):  acetaminophen     Tablet .. 1000 milliGRAM(s) Oral every 6 hours  enoxaparin Injectable 40 milliGRAM(s) SubCutaneous every 24 hours  ketorolac   Injectable 30 milliGRAM(s) IV Push every 8 hours  lactated ringers. 1000 milliLiter(s) (100 mL/Hr) IV Continuous <Continuous>  polyethylene glycol 3350 17 Gram(s) Oral every 24 hours  potassium chloride    Tablet ER 20 milliEquivalent(s) Oral once  simethicone 80 milliGRAM(s) Chew every 6 hours    MEDICATIONS  (PRN):  acetaminophen     Tablet .. 1000 milliGRAM(s) Oral every 6 hours PRN Mild Pain (1 - 3)  HYDROmorphone  Injectable 0.5 milliGRAM(s) IV Push every 15 minutes PRN Severe Pain (7 - 10)  ibuprofen  Tablet. 600 milliGRAM(s) Oral every 6 hours PRN Mild Pain (1 - 3)  ondansetron Injectable 8 milliGRAM(s) IV Push every 8 hours PRN Nausea and/or Vomiting  oxyCODONE    IR 5 milliGRAM(s) Oral every 3 hours PRN Moderate Pain (4 - 6)  oxyCODONE    IR 10 milliGRAM(s) Oral every 4 hours PRN Severe Pain (7 - 10)      LABS:                        10.4   6.34  )-----------( 239      ( 30 Apr 2022 06:31 )             33.0     04-30    139  |  104  |  9   ----------------------------<  94  3.8   |  27  |  0.82    Ca    9.0      30 Apr 2022 06:31  Phos  4.0     04-30  Mg     1.7     04-30

## 2022-04-30 NOTE — CONSULT NOTE ADULT - ATTENDING COMMENTS
36 y.o. female who presented for scheduled endometriosis excision. Patient is s/p laparoscopic enterolysis and myomectomy yesterday. She is POD # 1. Cardiology consulted for atypical chest pain w/ associated SOB that lasted several minutes.     - Atypical CP, no ischemic EKG changes, neg trop  - Low suspicion for ischemia

## 2022-04-30 NOTE — CONSULT NOTE ADULT - ASSESSMENT
Patient is a 36 y.o. female who presented for scheduled endometriosis excision. Patient is s/p laparoscopic enterolysis and myomectomy yesterday. She is POD # 1. Cardiology consulted for atypical chest pain w/ associated SOB that lasted several minutes.     #Atypical chest pain   Last night complained of SOB while talking on the phone with chest heaviness that resolved without any medications. She thinks it may have been because she has been unable to take deep breaths 2/2 pain form her abdominal incisions. The episode lasted a couple of minutes and resolved. This AM, she walked to the bathroom and did not experience any chest pain/ SOB/ ROBERTSON.   - EKG: NSR with non- specific EKG changes   - Troponin this AM negative with mild CK/ CKMB elevation. Repeat trop negative. Please add on CK and CKMB to cardiac enzymes from most recent BMP.   - Please repeat EKG as none in chart from today   - She has no risk factors and denies any fam hx of CAD. Atypical chest pain. Low suspicion for ischemia   - Non-urgent Echo on Monday     Cardiology to follow.  Patient is a 36 y.o. female who presented for scheduled endometriosis excision. Patient is s/p laparoscopic enterolysis and myomectomy yesterday. She is POD # 1. Cardiology consulted for atypical chest pain w/ associated SOB that lasted several minutes.     #Atypical chest pain   Last night complained of SOB while talking on the phone with chest heaviness that resolved without any medications. She thinks it may have been because she has been unable to take deep breaths 2/2 pain form her abdominal incisions. The episode lasted a couple of minutes and resolved. This AM, she walked to the bathroom and did not experience any chest pain/ SOB/ ROBERTSON.   - EKG: low voltage, NSR with non- specific EKG changes   - Troponin this AM negative with mild CK/ CKMB elevation. Repeat trop negative. Please add on CK and CKMB to cardiac enzymes from most recent BMP.   - Please repeat EKG as none in chart from today   - She has no risk factors and denies any fam hx of CAD. Atypical chest pain. Low suspicion for ischemia   - Non-urgent Echo on Monday     Cardiology to follow.

## 2022-04-30 NOTE — CONSULT NOTE ADULT - SUBJECTIVE AND OBJECTIVE BOX
HPI:  Patient is a 36y y.o. G0 presents for scheduled r/a Scheduled endometriosis excision. Patient has a hx of parital right oopherectomy 2010 due to endometrioma, b/l salpingectomy 2018. Denies chest pain, palpitations, SOB, n/v, diarrhea, constipation.    Ob hx: G0  Gyn hx: Endometriosis,  parital right oopherectomy 2010 due to endometrioma, b/l salpingectomy 2018.   PMH: Denies  Meds: Denies  PSH: Denies  Allergies: Denies    Physical Exam  Gen: Well-appearing. NAD.  Resp: Breathing comfortably on RA.  Abd: Soft, non-tender, non-distended. _wk size uterus.  Ext: No calf tenderness   (29 Apr 2022 14:02)      ROS: A 10-point review of systems was otherwise negative.    PAST MEDICAL & SURGICAL HISTORY:  Endometriosis    S/P right oophorectomy  Partial 2010    History of salpingostomy  bilateral, 2018        SOCIAL HISTORY:  FAMILY HISTORY:      ALLERGIES: 	  No Known Allergies            MEDICATIONS:  acetaminophen     Tablet .. 1000 milliGRAM(s) Oral every 6 hours  acetaminophen     Tablet .. 1000 milliGRAM(s) Oral every 6 hours PRN  enoxaparin Injectable 40 milliGRAM(s) SubCutaneous every 24 hours  HYDROmorphone  Injectable 0.5 milliGRAM(s) IV Push every 15 minutes PRN  ibuprofen  Tablet. 600 milliGRAM(s) Oral every 6 hours PRN  lactated ringers. 1000 milliLiter(s) IV Continuous <Continuous>  ondansetron Injectable 8 milliGRAM(s) IV Push every 8 hours PRN  oxyCODONE    IR 5 milliGRAM(s) Oral every 3 hours PRN  oxyCODONE    IR 10 milliGRAM(s) Oral every 4 hours PRN  polyethylene glycol 3350 17 Gram(s) Oral every 24 hours  simethicone 80 milliGRAM(s) Chew every 6 hours      PHYSICAL EXAM:  T(C): 36.8 (04-30-22 @ 08:26), Max: 36.9 (04-29-22 @ 20:00)  HR: 67 (04-30-22 @ 08:26) (67 - 91)  BP: 110/61 (04-30-22 @ 08:26) (105/54 - 125/76)  RR: 17 (04-30-22 @ 08:26) (14 - 24)  SpO2: 97% (04-30-22 @ 08:26) (96% - 100%)  Wt(kg): --    GEN: Awake, comfortable. NAD.   HEENT: NCAT, PERRL, EOMI. Mucosa moist. No JVD.   RESP: CTA b/l  CV: RRR, normal s1/s2. No m/r/g.  ABD: Soft, NTND. BS+  EXT: Warm. No edema, clubbing, or cyanosis.   NEURO: AAOx3. No focal deficits.    I&O's Summary    29 Apr 2022 07:01  -  30 Apr 2022 07:00  --------------------------------------------------------  IN: 1200 mL / OUT: 2070 mL / NET: -870 mL    30 Apr 2022 07:01  -  30 Apr 2022 14:02  --------------------------------------------------------  IN: 750 mL / OUT: 700 mL / NET: 50 mL        	  LABS:	 	    CARDIAC MARKERS:                                  10.4   6.34  )-----------( 239      ( 30 Apr 2022 06:31 )             33.0     04-30    139  |  104  |  9   ----------------------------<  94  3.8   |  27  |  0.82    Ca    9.0      30 Apr 2022 06:31  Phos  4.0     04-30  Mg     1.7     04-30      proBNP:   Lipid Profile:   HgA1c:   TSH:     TELEMETRY: 	    ECG:  	  RADIOLOGY:   ECHO:  STRESS:  CATH:   HPI:  Patient is a 36 y.o. female who presented for scheduled endometriosis excision. Patient is s/p laparoscopic enterolysis and myomectomy yesterday. Last night complained of SOB while talking on the phone with chest heaviness that resolved without any medications. She thinks it may have been because she has been unable to take deep breaths 2/2 pain form her abdominal incisions. The episode lasted a couple of minutes and resolved. This AM, she walked to the bathroom and did not experience any chest pain/ SOB/ ROBERTSON. Was slightly dizzy but otherwise no complaints.    PHYSICAL EXAM:  T(C): 36.8 (04-30-22 @ 08:26), Max: 36.9 (04-29-22 @ 20:00)  HR: 67 (04-30-22 @ 08:26) (67 - 91)  BP: 110/61 (04-30-22 @ 08:26) (105/54 - 125/76)  RR: 17 (04-30-22 @ 08:26) (14 - 24)  SpO2: 97% (04-30-22 @ 08:26) (96% - 100%)  Wt(kg): --    GEN: Awake, comfortable. NAD.   HEENT: NCAT, PERRL, EOMI. Mucosa moist. No JVD.   RESP: CTA b/l  CV: RRR, normal s1/s2. No m/r/g.  ABD: Soft, NTND. BS+  EXT: Warm. No edema, clubbing, or cyanosis.   NEURO: AAOx3. No focal deficits.    I&O's Summary    29 Apr 2022 07:01  -  30 Apr 2022 07:00  --------------------------------------------------------  IN: 1200 mL / OUT: 2070 mL / NET: -870 mL    30 Apr 2022 07:01  -  30 Apr 2022 14:02  --------------------------------------------------------  IN: 750 mL / OUT: 700 mL / NET: 50 mL        	  LABS:	 	    CARDIAC MARKERS:                                  10.4   6.34  )-----------( 239      ( 30 Apr 2022 06:31 )             33.0     04-30    139  |  104  |  9   ----------------------------<  94  3.8   |  27  |  0.82    Ca    9.0      30 Apr 2022 06:31  Phos  4.0     04-30  Mg     1.7     04-30

## 2022-04-30 NOTE — PROGRESS NOTE ADULT - ASSESSMENT
36 y.o. G0 POD1 s/p r/a endometriosis excision, myomectomy, cysto, D+C, 2 small full thickness rectal defects repaired by general surgery    Neuro: tylenol/toradol, oxy prn  CV: LR at 100cc/hr  Pulm: RA  FEN/GI: LRD, zofran prn, simethicone atc  : f/u TOV@1730  VTE prophylaxis: SCD

## 2022-05-01 LAB
ANION GAP SERPL CALC-SCNC: 8 MMOL/L — SIGNIFICANT CHANGE UP (ref 5–17)
BASOPHILS # BLD AUTO: 0.02 K/UL — SIGNIFICANT CHANGE UP (ref 0–0.2)
BASOPHILS NFR BLD AUTO: 0.3 % — SIGNIFICANT CHANGE UP (ref 0–2)
BUN SERPL-MCNC: 8 MG/DL — SIGNIFICANT CHANGE UP (ref 7–23)
CALCIUM SERPL-MCNC: 8.9 MG/DL — SIGNIFICANT CHANGE UP (ref 8.4–10.5)
CHLORIDE SERPL-SCNC: 103 MMOL/L — SIGNIFICANT CHANGE UP (ref 96–108)
CO2 SERPL-SCNC: 27 MMOL/L — SIGNIFICANT CHANGE UP (ref 22–31)
CREAT SERPL-MCNC: 0.73 MG/DL — SIGNIFICANT CHANGE UP (ref 0.5–1.3)
EGFR: 109 ML/MIN/1.73M2 — SIGNIFICANT CHANGE UP
EOSINOPHIL # BLD AUTO: 0.02 K/UL — SIGNIFICANT CHANGE UP (ref 0–0.5)
EOSINOPHIL NFR BLD AUTO: 0.3 % — SIGNIFICANT CHANGE UP (ref 0–6)
GLUCOSE SERPL-MCNC: 87 MG/DL — SIGNIFICANT CHANGE UP (ref 70–99)
HCT VFR BLD CALC: 29.8 % — LOW (ref 34.5–45)
HGB BLD-MCNC: 9.5 G/DL — LOW (ref 11.5–15.5)
IMM GRANULOCYTES NFR BLD AUTO: 0.3 % — SIGNIFICANT CHANGE UP (ref 0–1.5)
LYMPHOCYTES # BLD AUTO: 1.47 K/UL — SIGNIFICANT CHANGE UP (ref 1–3.3)
LYMPHOCYTES # BLD AUTO: 23.1 % — SIGNIFICANT CHANGE UP (ref 13–44)
MCHC RBC-ENTMCNC: 23.1 PG — LOW (ref 27–34)
MCHC RBC-ENTMCNC: 31.9 GM/DL — LOW (ref 32–36)
MCV RBC AUTO: 72.3 FL — LOW (ref 80–100)
MONOCYTES # BLD AUTO: 0.4 K/UL — SIGNIFICANT CHANGE UP (ref 0–0.9)
MONOCYTES NFR BLD AUTO: 6.3 % — SIGNIFICANT CHANGE UP (ref 2–14)
NEUTROPHILS # BLD AUTO: 4.44 K/UL — SIGNIFICANT CHANGE UP (ref 1.8–7.4)
NEUTROPHILS NFR BLD AUTO: 69.7 % — SIGNIFICANT CHANGE UP (ref 43–77)
NRBC # BLD: 0 /100 WBCS — SIGNIFICANT CHANGE UP (ref 0–0)
PLATELET # BLD AUTO: 223 K/UL — SIGNIFICANT CHANGE UP (ref 150–400)
POTASSIUM SERPL-MCNC: 3.9 MMOL/L — SIGNIFICANT CHANGE UP (ref 3.5–5.3)
POTASSIUM SERPL-SCNC: 3.9 MMOL/L — SIGNIFICANT CHANGE UP (ref 3.5–5.3)
RBC # BLD: 4.12 M/UL — SIGNIFICANT CHANGE UP (ref 3.8–5.2)
RBC # FLD: 14.3 % — SIGNIFICANT CHANGE UP (ref 10.3–14.5)
SODIUM SERPL-SCNC: 138 MMOL/L — SIGNIFICANT CHANGE UP (ref 135–145)
WBC # BLD: 6.37 K/UL — SIGNIFICANT CHANGE UP (ref 3.8–10.5)
WBC # FLD AUTO: 6.37 K/UL — SIGNIFICANT CHANGE UP (ref 3.8–10.5)

## 2022-05-01 RX ORDER — IBUPROFEN 200 MG
600 TABLET ORAL EVERY 6 HOURS
Refills: 0 | Status: DISCONTINUED | OUTPATIENT
Start: 2022-05-01 | End: 2022-05-04

## 2022-05-01 RX ADMIN — Medication 1000 MILLIGRAM(S): at 21:45

## 2022-05-01 RX ADMIN — SIMETHICONE 80 MILLIGRAM(S): 80 TABLET, CHEWABLE ORAL at 21:08

## 2022-05-01 RX ADMIN — Medication 1000 MILLIGRAM(S): at 15:42

## 2022-05-01 RX ADMIN — Medication 1000 MILLIGRAM(S): at 10:45

## 2022-05-01 RX ADMIN — SIMETHICONE 80 MILLIGRAM(S): 80 TABLET, CHEWABLE ORAL at 09:46

## 2022-05-01 RX ADMIN — Medication 1000 MILLIGRAM(S): at 04:04

## 2022-05-01 RX ADMIN — Medication 1000 MILLIGRAM(S): at 16:40

## 2022-05-01 RX ADMIN — Medication 1000 MILLIGRAM(S): at 03:15

## 2022-05-01 RX ADMIN — Medication 1000 MILLIGRAM(S): at 21:08

## 2022-05-01 RX ADMIN — Medication 1000 MILLIGRAM(S): at 09:46

## 2022-05-01 RX ADMIN — ENOXAPARIN SODIUM 40 MILLIGRAM(S): 100 INJECTION SUBCUTANEOUS at 09:46

## 2022-05-01 RX ADMIN — SIMETHICONE 80 MILLIGRAM(S): 80 TABLET, CHEWABLE ORAL at 15:41

## 2022-05-01 RX ADMIN — SIMETHICONE 80 MILLIGRAM(S): 80 TABLET, CHEWABLE ORAL at 03:15

## 2022-05-01 NOTE — PROGRESS NOTE ADULT - SUBJECTIVE AND OBJECTIVE BOX
Pt seen and examined at bedside. Pt states mild abdominal pain. Pt [x] ambulating, tolerating CL diet, [x] flatus,  [x] urinating adequately.   Pt denies fever, chills, chest pain, SOB, nausea, vomiting, lightheadedness, dizziness.      T(F): 98.4 (05-01-22 @ 05:48), Max: 98.6 (04-30-22 @ 23:52)  HR: 71 (05-01-22 @ 05:48) (67 - 74)  BP: 117/77 (05-01-22 @ 05:48) (110/61 - 120/80)  RR: 18 (05-01-22 @ 05:48) (16 - 18)  SpO2: 99% (05-01-22 @ 05:48) (95% - 99%)  Wt(kg): --  I&O's Summary    29 Apr 2022 07:01  -  30 Apr 2022 07:00  --------------------------------------------------------  IN: 1200 mL / OUT: 2070 mL / NET: -870 mL    30 Apr 2022 07:01  -  01 May 2022 06:53  --------------------------------------------------------  IN: 1650 mL / OUT: 1650 mL / NET: 0 mL        MEDICATIONS  (STANDING):  acetaminophen     Tablet .. 1000 milliGRAM(s) Oral every 6 hours  enoxaparin Injectable 40 milliGRAM(s) SubCutaneous every 24 hours  simethicone 80 milliGRAM(s) Chew every 6 hours    MEDICATIONS  (PRN):  acetaminophen     Tablet .. 1000 milliGRAM(s) Oral every 6 hours PRN Mild Pain (1 - 3)  HYDROmorphone  Injectable 0.5 milliGRAM(s) IV Push every 15 minutes PRN Severe Pain (7 - 10)  ibuprofen  Tablet. 600 milliGRAM(s) Oral every 6 hours PRN Mild Pain (1 - 3)  ondansetron Injectable 8 milliGRAM(s) IV Push every 8 hours PRN Nausea and/or Vomiting  oxyCODONE    IR 5 milliGRAM(s) Oral every 3 hours PRN Moderate Pain (4 - 6)  oxyCODONE    IR 10 milliGRAM(s) Oral every 4 hours PRN Severe Pain (7 - 10)      Physical Exam:  Constitutional: NAD  Pulmonary: no increased work of breathing  Cardiovascular: Regular rate and rhythm   Abdomen: incision sites clean, dry, intact. Soft, mildly tender, [non] distended, no guarding, no rebound, [+] bowel sounds  Extremities: no lower extremity edema or calf tenderness. SCDs in place     LABS:                        10.4   6.34  )-----------( 239      ( 30 Apr 2022 06:31 )             33.0     04-30    139  |  104  |  9   ----------------------------<  94  3.8   |  27  |  0.82    Ca    9.0      30 Apr 2022 06:31  Phos  4.0     04-30  Mg     1.7     04-30            RADIOLOGY & ADDITIONAL TESTS:

## 2022-05-01 NOTE — PROGRESS NOTE ADULT - ASSESSMENT
36 y.o. G0 POD2 s/p r/a endometriosis excision, myomectomy, cysto, D+C, 2 small full thickness rectal defects repaired by general surgery    Neuro: tylenol/toradol, oxy prn  CV: heplocked; cardiology following for episode of chest pain POD1, now resolved. Negative troponins, and low suspicion for ischemic cardiac etiology.  Will f/u TTE (ordered)   Pulm: RA  FEN/GI: tolerating CLD, now passing more flatus. Possibly advance today, will f/u general surgery recommendations. ,Zofran prn, simethicone atc  : voiding   VTE prophylaxis: SCDs, lovenox

## 2022-05-01 NOTE — PROGRESS NOTE ADULT - ASSESSMENT
36 y.o. otherwise healthy female pt. Now POD1 from r/a endometriosis excision, myomectomy, cysto, D+C, 2 small full thickness rectal defects repaired by general surgery. Pt reporting abdominal distension and nausea post-operatively, now significantly improved. HD stable.    Advance diet as tolerated as pt is passing flatus, no N/V  Rest of care per primary team    Team 1C to continue to follow    d/w chief resident and attending Dr. High

## 2022-05-01 NOTE — PROGRESS NOTE ADULT - SUBJECTIVE AND OBJECTIVE BOX
SUBJECTIVE: Pt seen and examined at bedside this am by surgery team. GILBERT OVN. Tolerating diet, pain well controlled. +F/-BM. Denies f/n/v/cp/sob.    MEDICATIONS  (STANDING):  acetaminophen     Tablet .. 1000 milliGRAM(s) Oral every 6 hours  enoxaparin Injectable 40 milliGRAM(s) SubCutaneous every 24 hours  simethicone 80 milliGRAM(s) Chew every 6 hours    MEDICATIONS  (PRN):  acetaminophen     Tablet .. 1000 milliGRAM(s) Oral every 6 hours PRN Mild Pain (1 - 3)  HYDROmorphone  Injectable 0.5 milliGRAM(s) IV Push every 15 minutes PRN Severe Pain (7 - 10)  ibuprofen  Tablet. 600 milliGRAM(s) Oral every 6 hours PRN Mild Pain (1-3)  ibuprofen  Tablet. 600 milliGRAM(s) Oral every 6 hours PRN Mild Pain (1 - 3)  ondansetron Injectable 8 milliGRAM(s) IV Push every 8 hours PRN Nausea and/or Vomiting  oxyCODONE    IR 5 milliGRAM(s) Oral every 3 hours PRN Moderate Pain (4 - 6)  oxyCODONE    IR 10 milliGRAM(s) Oral every 4 hours PRN Severe Pain (7 - 10)    Vital Signs Last 24 Hrs  T(C): 36.8 (01 May 2022 13:05), Max: 37 (30 Apr 2022 23:52)  T(F): 98.2 (01 May 2022 13:05), Max: 98.6 (30 Apr 2022 23:52)  HR: 80 (01 May 2022 13:05) (68 - 80)  BP: 115/70 (01 May 2022 13:05) (113/73 - 120/80)  BP(mean): 91 (30 Apr 2022 14:00) (91 - 91)  RR: 18 (01 May 2022 13:05) (16 - 18)  SpO2: 99% (01 May 2022 13:05) (95% - 100%)    PHYSICAL EXAM:    Constitutional: A&Ox3, NAD    Respiratory: non labored breathing, no respiratory distress    Cardiovascular: NSR, RRR    Gastrointestinal: abdomen soft, mildly distended, appropriately ttp to incisions. Dressings c/d/i.     Extremities: wwp, no calf tenderness or edema. SCDs in place     I&O's Detail    30 Apr 2022 07:01  -  01 May 2022 07:00  --------------------------------------------------------  IN:    IV PiggyBack: 50 mL    Lactated Ringers: 1400 mL    Oral Fluid: 200 mL  Total IN: 1650 mL    OUT:    Indwelling Catheter - Urethral (mL): 300 mL    Voided (mL): 1350 mL  Total OUT: 1650 mL    Total NET: 0 mL      01 May 2022 07:01  -  01 May 2022 13:06  --------------------------------------------------------  IN:    Oral Fluid: 360 mL  Total IN: 360 mL    OUT:    Voided (mL): 300 mL  Total OUT: 300 mL    Total NET: 60 mL          LABS:                        9.5    6.37  )-----------( 223      ( 01 May 2022 07:10 )             29.8     05-01    138  |  103  |  8   ----------------------------<  87  3.9   |  27  |  0.73    Ca    8.9      01 May 2022 07:10  Phos  4.0     04-30  Mg     1.7     04-30            RADIOLOGY & ADDITIONAL STUDIES:

## 2022-05-02 LAB
ANION GAP SERPL CALC-SCNC: 9 MMOL/L — SIGNIFICANT CHANGE UP (ref 5–17)
BUN SERPL-MCNC: 8 MG/DL — SIGNIFICANT CHANGE UP (ref 7–23)
CALCIUM SERPL-MCNC: 9.5 MG/DL — SIGNIFICANT CHANGE UP (ref 8.4–10.5)
CHLORIDE SERPL-SCNC: 103 MMOL/L — SIGNIFICANT CHANGE UP (ref 96–108)
CO2 SERPL-SCNC: 29 MMOL/L — SIGNIFICANT CHANGE UP (ref 22–31)
CREAT SERPL-MCNC: 0.71 MG/DL — SIGNIFICANT CHANGE UP (ref 0.5–1.3)
EGFR: 112 ML/MIN/1.73M2 — SIGNIFICANT CHANGE UP
GLUCOSE SERPL-MCNC: 88 MG/DL — SIGNIFICANT CHANGE UP (ref 70–99)
HCT VFR BLD CALC: 30.2 % — LOW (ref 34.5–45)
HGB BLD-MCNC: 9.4 G/DL — LOW (ref 11.5–15.5)
MAGNESIUM SERPL-MCNC: 1.8 MG/DL — SIGNIFICANT CHANGE UP (ref 1.6–2.6)
MCHC RBC-ENTMCNC: 22.9 PG — LOW (ref 27–34)
MCHC RBC-ENTMCNC: 31.1 GM/DL — LOW (ref 32–36)
MCV RBC AUTO: 73.5 FL — LOW (ref 80–100)
NRBC # BLD: 0 /100 WBCS — SIGNIFICANT CHANGE UP (ref 0–0)
PHOSPHATE SERPL-MCNC: 3.9 MG/DL — SIGNIFICANT CHANGE UP (ref 2.5–4.5)
PLATELET # BLD AUTO: 219 K/UL — SIGNIFICANT CHANGE UP (ref 150–400)
POTASSIUM SERPL-MCNC: 4.2 MMOL/L — SIGNIFICANT CHANGE UP (ref 3.5–5.3)
POTASSIUM SERPL-SCNC: 4.2 MMOL/L — SIGNIFICANT CHANGE UP (ref 3.5–5.3)
RBC # BLD: 4.11 M/UL — SIGNIFICANT CHANGE UP (ref 3.8–5.2)
RBC # FLD: 14.4 % — SIGNIFICANT CHANGE UP (ref 10.3–14.5)
SODIUM SERPL-SCNC: 141 MMOL/L — SIGNIFICANT CHANGE UP (ref 135–145)
WBC # BLD: 5.98 K/UL — SIGNIFICANT CHANGE UP (ref 3.8–10.5)
WBC # FLD AUTO: 5.98 K/UL — SIGNIFICANT CHANGE UP (ref 3.8–10.5)

## 2022-05-02 PROCEDURE — 99253 IP/OBS CNSLTJ NEW/EST LOW 45: CPT

## 2022-05-02 RX ORDER — MAGNESIUM SULFATE 500 MG/ML
2 VIAL (ML) INJECTION ONCE
Refills: 0 | Status: COMPLETED | OUTPATIENT
Start: 2022-05-02 | End: 2022-05-02

## 2022-05-02 RX ORDER — LORATADINE 10 MG/1
10 TABLET ORAL DAILY
Refills: 0 | Status: DISCONTINUED | OUTPATIENT
Start: 2022-05-02 | End: 2022-05-06

## 2022-05-02 RX ADMIN — SIMETHICONE 80 MILLIGRAM(S): 80 TABLET, CHEWABLE ORAL at 15:48

## 2022-05-02 RX ADMIN — Medication 1000 MILLIGRAM(S): at 15:48

## 2022-05-02 RX ADMIN — LORATADINE 10 MILLIGRAM(S): 10 TABLET ORAL at 18:50

## 2022-05-02 RX ADMIN — Medication 1000 MILLIGRAM(S): at 16:28

## 2022-05-02 RX ADMIN — ONDANSETRON 8 MILLIGRAM(S): 8 TABLET, FILM COATED ORAL at 04:06

## 2022-05-02 RX ADMIN — Medication 1000 MILLIGRAM(S): at 03:38

## 2022-05-02 RX ADMIN — Medication 25 GRAM(S): at 09:46

## 2022-05-02 RX ADMIN — SIMETHICONE 80 MILLIGRAM(S): 80 TABLET, CHEWABLE ORAL at 23:07

## 2022-05-02 RX ADMIN — Medication 1000 MILLIGRAM(S): at 23:07

## 2022-05-02 RX ADMIN — Medication 1000 MILLIGRAM(S): at 09:46

## 2022-05-02 RX ADMIN — ENOXAPARIN SODIUM 40 MILLIGRAM(S): 100 INJECTION SUBCUTANEOUS at 09:46

## 2022-05-02 RX ADMIN — Medication 1000 MILLIGRAM(S): at 03:14

## 2022-05-02 RX ADMIN — SIMETHICONE 80 MILLIGRAM(S): 80 TABLET, CHEWABLE ORAL at 09:46

## 2022-05-02 RX ADMIN — SIMETHICONE 80 MILLIGRAM(S): 80 TABLET, CHEWABLE ORAL at 03:14

## 2022-05-02 RX ADMIN — Medication 1000 MILLIGRAM(S): at 10:16

## 2022-05-02 NOTE — PROGRESS NOTE ADULT - SUBJECTIVE AND OBJECTIVE BOX
SUBJECTIVE: Pt seen and examined at bedside with chief. Pt denies any complaints. Pain well controlled. Tolerating diet without N/V. Admits to      MEDICATIONS  (STANDING):  acetaminophen     Tablet .. 1000 milliGRAM(s) Oral every 6 hours  enoxaparin Injectable 40 milliGRAM(s) SubCutaneous every 24 hours  magnesium sulfate  IVPB 2 Gram(s) IV Intermittent once  simethicone 80 milliGRAM(s) Chew every 6 hours    MEDICATIONS  (PRN):  acetaminophen     Tablet .. 1000 milliGRAM(s) Oral every 6 hours PRN Mild Pain (1 - 3)  HYDROmorphone  Injectable 0.5 milliGRAM(s) IV Push every 15 minutes PRN Severe Pain (7 - 10)  ibuprofen  Tablet. 600 milliGRAM(s) Oral every 6 hours PRN Mild Pain (1-3)  ibuprofen  Tablet. 600 milliGRAM(s) Oral every 6 hours PRN Mild Pain (1 - 3)  ondansetron Injectable 8 milliGRAM(s) IV Push every 8 hours PRN Nausea and/or Vomiting  oxyCODONE    IR 5 milliGRAM(s) Oral every 3 hours PRN Moderate Pain (4 - 6)  oxyCODONE    IR 10 milliGRAM(s) Oral every 4 hours PRN Severe Pain (7 - 10)      Vital Signs Last 24 Hrs  T(C): 37 (02 May 2022 05:29), Max: 37 (02 May 2022 05:29)  T(F): 98.6 (02 May 2022 05:29), Max: 98.6 (02 May 2022 05:29)  HR: 71 (02 May 2022 05:29) (71 - 80)  BP: 116/78 (02 May 2022 05:29) (106/66 - 116/78)  BP(mean): --  RR: 16 (02 May 2022 05:29) (16 - 18)  SpO2: 99% (02 May 2022 05:29) (94% - 100%)    PHYSICAL EXAM:      Constitutional: A&Ox3    Respiratory: non labored breathing, no respiratory distress    Cardiovascular: NSR, RRR    Gastrointestinal: soft, decrease in distension. non tender                 Incision: CDI     Genitourinary: Voiding     Extremities: (-) edema                  I&O's Detail    01 May 2022 07:01  -  02 May 2022 07:00  --------------------------------------------------------  IN:    Oral Fluid: 360 mL  Total IN: 360 mL    OUT:    Voided (mL): 1200 mL  Total OUT: 1200 mL    Total NET: -840 mL          LABS:                        9.4    5.98  )-----------( 219      ( 02 May 2022 06:42 )             30.2     05-02    141  |  103  |  8   ----------------------------<  88  4.2   |  29  |  0.71    Ca    9.5      02 May 2022 06:42  Phos  3.9     05-02  Mg     1.8     05-02            RADIOLOGY & ADDITIONAL STUDIES:

## 2022-05-02 NOTE — PROGRESS NOTE ADULT - ASSESSMENT
Patient is a 36 y.o. female who presented for scheduled endometriosis excision. Patient is s/p laparoscopic enterolysis and myomectomy 4/29. Cardiology consulted for atypical chest pain w/ associated SOB that lasted several minutes.     #Atypical chest pain   Last night complained of SOB while talking on the phone with chest heaviness that resolved without any medications. She thinks it may have been because she has been unable to take deep breaths 2/2 pain form her abdominal incisions. The episode lasted a couple of minutes and resolved. This AM, she walked to the bathroom and did not experience any chest pain/ SOB/ ROBERTSON.   - EKG: low voltage, NSR with non- specific EKG changes   - Troponin this AM negative with mild CK/ CKMB elevation. Repeat trop negative. Please add on CK and CKMB to cardiac enzymes from most recent BMP.   - Please repeat EKG as none in chart from today   - She has no risk factors and denies any fam hx of CAD. Atypical chest pain. Low suspicion for ischemia   - Non-urgent Echo on Monday     *incomplete  Patient is a 36 y.o. female who presented for scheduled endometriosis excision. Patient is s/p laparoscopic enterolysis and myomectomy 4/29. Cardiology consulted for atypical chest pain w/ associated SOB that lasted several minutes.     #Atypical chest pain   Last night complained of SOB while talking on the phone with chest heaviness that resolved without any medications. She thinks it may have been because she has been unable to take deep breaths 2/2 pain form her abdominal incisions. The episode lasted a couple of minutes and resolved. This AM, she walked to the bathroom and did not experience any chest pain/ SOB/ ROBERTSON.   - EKG: low voltage, NSR with non- specific EKG changes   - Troponin this AM negative with mild CK/ CKMB elevation. Repeat trop negative.   - She has no risk factors and denies any fam hx of CAD. Atypical chest pain. Low suspicion for ischemia   - patient does not need TTE    Cardiology will sign off. Please re-consult as needed

## 2022-05-02 NOTE — PROGRESS NOTE ADULT - ASSESSMENT
36 y.o. G0 POD3 s/p r/a endometriosis excision, myomectomy, cysto, D+C, 2 small full thickness rectal defects repaired by general surgery    Neuro: tylenol/toradol, oxy prn  CV: heplocked; cardiology following for episode of chest pain POD1, now resolved. Negative troponins, and low suspicion for ischemic cardiac etiology.  Will f/u TTE (ordered)   Pulm: RA  FEN/GI: tolerating LRD, f/u general surgery recommendations. ,Zofran prn, simethicone atc  : voiding   VTE prophylaxis: SCDs, lovenox

## 2022-05-02 NOTE — PROGRESS NOTE ADULT - SUBJECTIVE AND OBJECTIVE BOX
better PO intake  bowel function returning  AVSS  Abd softer  less distended    OOB  Pulmonary toilet

## 2022-05-02 NOTE — PROGRESS NOTE ADULT - SUBJECTIVE AND OBJECTIVE BOX
Pt seen and examined at bedside. Pt states mild abdominal pain. Pt [x] ambulating, tolerating LR diet, [x] flatus,  [x] urinating adequately.   Pt denies fever, chills, chest pain, SOB, nausea, vomiting, lightheadedness, dizziness.      T(F): 98.6 (05-02-22 @ 05:29), Max: 98.6 (05-02-22 @ 05:29)  HR: 71 (05-02-22 @ 05:29) (71 - 80)  BP: 116/78 (05-02-22 @ 05:29) (106/66 - 116/78)  RR: 16 (05-02-22 @ 05:29) (16 - 18)  SpO2: 99% (05-02-22 @ 05:29) (94% - 100%)  Wt(kg): --  I&O's Summary    01 May 2022 07:01  -  02 May 2022 07:00  --------------------------------------------------------  IN: 360 mL / OUT: 1200 mL / NET: -840 mL        MEDICATIONS  (STANDING):  acetaminophen     Tablet .. 1000 milliGRAM(s) Oral every 6 hours  enoxaparin Injectable 40 milliGRAM(s) SubCutaneous every 24 hours  simethicone 80 milliGRAM(s) Chew every 6 hours    MEDICATIONS  (PRN):  acetaminophen     Tablet .. 1000 milliGRAM(s) Oral every 6 hours PRN Mild Pain (1 - 3)  HYDROmorphone  Injectable 0.5 milliGRAM(s) IV Push every 15 minutes PRN Severe Pain (7 - 10)  ibuprofen  Tablet. 600 milliGRAM(s) Oral every 6 hours PRN Mild Pain (1-3)  ibuprofen  Tablet. 600 milliGRAM(s) Oral every 6 hours PRN Mild Pain (1 - 3)  ondansetron Injectable 8 milliGRAM(s) IV Push every 8 hours PRN Nausea and/or Vomiting  oxyCODONE    IR 5 milliGRAM(s) Oral every 3 hours PRN Moderate Pain (4 - 6)  oxyCODONE    IR 10 milliGRAM(s) Oral every 4 hours PRN Severe Pain (7 - 10)      Physical Exam:  Constitutional: NAD  Pulmonary: no increased work of breathing  Cardiovascular: Regular rate and rhythm   Abdomen: incision sites clean, dry, intact. Soft, mildly tender, [mildly] distended, no guarding, no rebound, [+] bowel sounds  Extremities: no lower extremity edema or calf tenderness. SCDs in place     LABS:                        9.4    5.98  )-----------( 219      ( 02 May 2022 06:42 )             30.2     05-02    141  |  103  |  8   ----------------------------<  88  4.2   |  29  |  0.71    Ca    9.5      02 May 2022 06:42  Phos  3.9     05-02  Mg     1.8     05-02            RADIOLOGY & ADDITIONAL TESTS:

## 2022-05-02 NOTE — PROGRESS NOTE ADULT - ASSESSMENT
36 y.o. otherwise healthy female pt. Now POD#3 from endometriosis excision, myomectomy, cysto, D+C, 2 small full thickness rectal defects repaired by general surgery.    continue LRD  Care per primary  Team 1C to continue to follow  [phone]

## 2022-05-02 NOTE — PROGRESS NOTE ADULT - SUBJECTIVE AND OBJECTIVE BOX
INTERVAL EVENTS:    PAST MEDICAL & SURGICAL HISTORY:  Endometriosis    History of surgery  2010 ovary    History of neck surgery    History of surgery  2018 fallopian tube    S/P right oophorectomy  Partial 2010    History of salpingostomy  bilateral, 2018        MEDICATIONS  (STANDING):  acetaminophen     Tablet .. 1000 milliGRAM(s) Oral every 6 hours  enoxaparin Injectable 40 milliGRAM(s) SubCutaneous every 24 hours  magnesium sulfate  IVPB 2 Gram(s) IV Intermittent once  simethicone 80 milliGRAM(s) Chew every 6 hours    MEDICATIONS  (PRN):  acetaminophen     Tablet .. 1000 milliGRAM(s) Oral every 6 hours PRN Mild Pain (1 - 3)  HYDROmorphone  Injectable 0.5 milliGRAM(s) IV Push every 15 minutes PRN Severe Pain (7 - 10)  ibuprofen  Tablet. 600 milliGRAM(s) Oral every 6 hours PRN Mild Pain (1-3)  ibuprofen  Tablet. 600 milliGRAM(s) Oral every 6 hours PRN Mild Pain (1 - 3)  ondansetron Injectable 8 milliGRAM(s) IV Push every 8 hours PRN Nausea and/or Vomiting  oxyCODONE    IR 5 milliGRAM(s) Oral every 3 hours PRN Moderate Pain (4 - 6)  oxyCODONE    IR 10 milliGRAM(s) Oral every 4 hours PRN Severe Pain (7 - 10)    Vital Signs Last 24 Hrs  T(C): 36.9 (02 May 2022 08:41), Max: 37 (02 May 2022 05:29)  T(F): 98.5 (02 May 2022 08:41), Max: 98.6 (02 May 2022 05:29)  HR: 68 (02 May 2022 08:41) (68 - 80)  BP: 116/74 (02 May 2022 08:41) (106/66 - 116/78)  BP(mean): --  RR: 16 (02 May 2022 08:41) (16 - 18)  SpO2: 98% (02 May 2022 08:41) (94% - 99%)    PHYSICAL EXAM:  GEN: NAD  HEENT: EOMI   RESP: CTA b/l  CV: RRR. Normal S1/S2. No m/r/g.  ABD: soft, non-distended  EXT: No edema   NEURO: alert and attentive    LABS:                        9.4    5.98  )-----------( 219      ( 02 May 2022 06:42 )             30.2     05-02    141  |  103  |  8   ----------------------------<  88  4.2   |  29  |  0.71    Ca    9.5      02 May 2022 06:42  Phos  3.9     05-02  Mg     1.8     05-02      CARDIAC MARKERS ( 30 Apr 2022 14:29 )  x     / 0.01 ng/mL / x     / x     / x              I&O's Summary    01 May 2022 07:01  -  02 May 2022 07:00  --------------------------------------------------------  IN: 360 mL / OUT: 1200 mL / NET: -840 mL    02 May 2022 07:01  -  02 May 2022 09:39  --------------------------------------------------------  IN: 0 mL / OUT: 250 mL / NET: -250 mL               INTERVAL EVENTS: grace    PAST MEDICAL & SURGICAL HISTORY:  Endometriosis    History of surgery  2010 ovary    History of neck surgery    History of surgery  2018 fallopian tube    S/P right oophorectomy  Partial 2010    History of salpingostomy  bilateral, 2018        MEDICATIONS  (STANDING):  acetaminophen     Tablet .. 1000 milliGRAM(s) Oral every 6 hours  enoxaparin Injectable 40 milliGRAM(s) SubCutaneous every 24 hours  magnesium sulfate  IVPB 2 Gram(s) IV Intermittent once  simethicone 80 milliGRAM(s) Chew every 6 hours    MEDICATIONS  (PRN):  acetaminophen     Tablet .. 1000 milliGRAM(s) Oral every 6 hours PRN Mild Pain (1 - 3)  HYDROmorphone  Injectable 0.5 milliGRAM(s) IV Push every 15 minutes PRN Severe Pain (7 - 10)  ibuprofen  Tablet. 600 milliGRAM(s) Oral every 6 hours PRN Mild Pain (1-3)  ibuprofen  Tablet. 600 milliGRAM(s) Oral every 6 hours PRN Mild Pain (1 - 3)  ondansetron Injectable 8 milliGRAM(s) IV Push every 8 hours PRN Nausea and/or Vomiting  oxyCODONE    IR 5 milliGRAM(s) Oral every 3 hours PRN Moderate Pain (4 - 6)  oxyCODONE    IR 10 milliGRAM(s) Oral every 4 hours PRN Severe Pain (7 - 10)    Vital Signs Last 24 Hrs  T(C): 36.9 (02 May 2022 08:41), Max: 37 (02 May 2022 05:29)  T(F): 98.5 (02 May 2022 08:41), Max: 98.6 (02 May 2022 05:29)  HR: 68 (02 May 2022 08:41) (68 - 80)  BP: 116/74 (02 May 2022 08:41) (106/66 - 116/78)  BP(mean): --  RR: 16 (02 May 2022 08:41) (16 - 18)  SpO2: 98% (02 May 2022 08:41) (94% - 99%)    PHYSICAL EXAM:  GEN: NAD  HEENT: EOMI   RESP: CTA b/l  CV: RRR. Normal S1/S2. No m/r/g.  ABD: soft, non-distended  EXT: No edema   NEURO: alert and attentive    LABS:                        9.4    5.98  )-----------( 219      ( 02 May 2022 06:42 )             30.2     05-02    141  |  103  |  8   ----------------------------<  88  4.2   |  29  |  0.71    Ca    9.5      02 May 2022 06:42  Phos  3.9     05-02  Mg     1.8     05-02      CARDIAC MARKERS ( 30 Apr 2022 14:29 )  x     / 0.01 ng/mL / x     / x     / x              I&O's Summary    01 May 2022 07:01  -  02 May 2022 07:00  --------------------------------------------------------  IN: 360 mL / OUT: 1200 mL / NET: -840 mL    02 May 2022 07:01  -  02 May 2022 09:39  --------------------------------------------------------  IN: 0 mL / OUT: 250 mL / NET: -250 mL

## 2022-05-03 LAB
ANION GAP SERPL CALC-SCNC: 8 MMOL/L — SIGNIFICANT CHANGE UP (ref 5–17)
ANISOCYTOSIS BLD QL: SLIGHT — SIGNIFICANT CHANGE UP
BASOPHILS # BLD AUTO: 0 K/UL — SIGNIFICANT CHANGE UP (ref 0–0.2)
BASOPHILS NFR BLD AUTO: 0 % — SIGNIFICANT CHANGE UP (ref 0–2)
BUN SERPL-MCNC: 6 MG/DL — LOW (ref 7–23)
CALCIUM SERPL-MCNC: 9 MG/DL — SIGNIFICANT CHANGE UP (ref 8.4–10.5)
CHLORIDE SERPL-SCNC: 102 MMOL/L — SIGNIFICANT CHANGE UP (ref 96–108)
CO2 SERPL-SCNC: 26 MMOL/L — SIGNIFICANT CHANGE UP (ref 22–31)
CREAT SERPL-MCNC: 0.71 MG/DL — SIGNIFICANT CHANGE UP (ref 0.5–1.3)
EGFR: 112 ML/MIN/1.73M2 — SIGNIFICANT CHANGE UP
EOSINOPHIL # BLD AUTO: 0 K/UL — SIGNIFICANT CHANGE UP (ref 0–0.5)
EOSINOPHIL NFR BLD AUTO: 0 % — SIGNIFICANT CHANGE UP (ref 0–6)
GIANT PLATELETS BLD QL SMEAR: PRESENT — SIGNIFICANT CHANGE UP
GLUCOSE SERPL-MCNC: 102 MG/DL — HIGH (ref 70–99)
HCT VFR BLD CALC: 29.8 % — LOW (ref 34.5–45)
HCT VFR BLD CALC: 31.9 % — LOW (ref 34.5–45)
HGB BLD-MCNC: 10.2 G/DL — LOW (ref 11.5–15.5)
HGB BLD-MCNC: 9.4 G/DL — LOW (ref 11.5–15.5)
HYPOCHROMIA BLD QL: SLIGHT — SIGNIFICANT CHANGE UP
LYMPHOCYTES # BLD AUTO: 1.63 K/UL — SIGNIFICANT CHANGE UP (ref 1–3.3)
LYMPHOCYTES # BLD AUTO: 15.6 % — SIGNIFICANT CHANGE UP (ref 13–44)
MAGNESIUM SERPL-MCNC: 1.7 MG/DL — SIGNIFICANT CHANGE UP (ref 1.6–2.6)
MANUAL SMEAR VERIFICATION: SIGNIFICANT CHANGE UP
MCHC RBC-ENTMCNC: 23 PG — LOW (ref 27–34)
MCHC RBC-ENTMCNC: 23.3 PG — LOW (ref 27–34)
MCHC RBC-ENTMCNC: 31.5 GM/DL — LOW (ref 32–36)
MCHC RBC-ENTMCNC: 32 GM/DL — SIGNIFICANT CHANGE UP (ref 32–36)
MCV RBC AUTO: 71.8 FL — LOW (ref 80–100)
MCV RBC AUTO: 73.9 FL — LOW (ref 80–100)
MICROCYTES BLD QL: SLIGHT — SIGNIFICANT CHANGE UP
MONOCYTES # BLD AUTO: 0.73 K/UL — SIGNIFICANT CHANGE UP (ref 0–0.9)
MONOCYTES NFR BLD AUTO: 7 % — SIGNIFICANT CHANGE UP (ref 2–14)
NEUTROPHILS # BLD AUTO: 8.07 K/UL — HIGH (ref 1.8–7.4)
NEUTROPHILS NFR BLD AUTO: 77.4 % — HIGH (ref 43–77)
NRBC # BLD: 0 /100 WBCS — SIGNIFICANT CHANGE UP (ref 0–0)
OVALOCYTES BLD QL SMEAR: SLIGHT — SIGNIFICANT CHANGE UP
PHOSPHATE SERPL-MCNC: 3.6 MG/DL — SIGNIFICANT CHANGE UP (ref 2.5–4.5)
PLAT MORPH BLD: ABNORMAL
PLATELET # BLD AUTO: 252 K/UL — SIGNIFICANT CHANGE UP (ref 150–400)
PLATELET # BLD AUTO: 269 K/UL — SIGNIFICANT CHANGE UP (ref 150–400)
POIKILOCYTOSIS BLD QL AUTO: SLIGHT — SIGNIFICANT CHANGE UP
POLYCHROMASIA BLD QL SMEAR: SLIGHT — SIGNIFICANT CHANGE UP
POTASSIUM SERPL-MCNC: 3.8 MMOL/L — SIGNIFICANT CHANGE UP (ref 3.5–5.3)
POTASSIUM SERPL-SCNC: 3.8 MMOL/L — SIGNIFICANT CHANGE UP (ref 3.5–5.3)
RBC # BLD: 4.03 M/UL — SIGNIFICANT CHANGE UP (ref 3.8–5.2)
RBC # BLD: 4.44 M/UL — SIGNIFICANT CHANGE UP (ref 3.8–5.2)
RBC # FLD: 14 % — SIGNIFICANT CHANGE UP (ref 10.3–14.5)
RBC # FLD: 14.1 % — SIGNIFICANT CHANGE UP (ref 10.3–14.5)
RBC BLD AUTO: ABNORMAL
SODIUM SERPL-SCNC: 136 MMOL/L — SIGNIFICANT CHANGE UP (ref 135–145)
WBC # BLD: 10.42 K/UL — SIGNIFICANT CHANGE UP (ref 3.8–10.5)
WBC # BLD: 7.52 K/UL — SIGNIFICANT CHANGE UP (ref 3.8–10.5)
WBC # FLD AUTO: 10.42 K/UL — SIGNIFICANT CHANGE UP (ref 3.8–10.5)
WBC # FLD AUTO: 7.52 K/UL — SIGNIFICANT CHANGE UP (ref 3.8–10.5)

## 2022-05-03 PROCEDURE — 71045 X-RAY EXAM CHEST 1 VIEW: CPT | Mod: 26

## 2022-05-03 PROCEDURE — 74177 CT ABD & PELVIS W/CONTRAST: CPT | Mod: 26

## 2022-05-03 RX ORDER — MAGNESIUM SULFATE 500 MG/ML
2 VIAL (ML) INJECTION ONCE
Refills: 0 | Status: COMPLETED | OUTPATIENT
Start: 2022-05-03 | End: 2022-05-03

## 2022-05-03 RX ORDER — IOHEXOL 300 MG/ML
30 INJECTION, SOLUTION INTRAVENOUS ONCE
Refills: 0 | Status: DISCONTINUED | OUTPATIENT
Start: 2022-05-03 | End: 2022-05-06

## 2022-05-03 RX ORDER — SODIUM CHLORIDE 9 MG/ML
1000 INJECTION, SOLUTION INTRAVENOUS
Refills: 0 | Status: DISCONTINUED | OUTPATIENT
Start: 2022-05-03 | End: 2022-05-04

## 2022-05-03 RX ORDER — PIPERACILLIN AND TAZOBACTAM 4; .5 G/20ML; G/20ML
3.38 INJECTION, POWDER, LYOPHILIZED, FOR SOLUTION INTRAVENOUS EVERY 6 HOURS
Refills: 0 | Status: DISCONTINUED | OUTPATIENT
Start: 2022-05-03 | End: 2022-05-04

## 2022-05-03 RX ORDER — POTASSIUM CHLORIDE 20 MEQ
20 PACKET (EA) ORAL ONCE
Refills: 0 | Status: COMPLETED | OUTPATIENT
Start: 2022-05-03 | End: 2022-05-03

## 2022-05-03 RX ORDER — PIPERACILLIN AND TAZOBACTAM 4; .5 G/20ML; G/20ML
3.38 INJECTION, POWDER, LYOPHILIZED, FOR SOLUTION INTRAVENOUS ONCE
Refills: 0 | Status: COMPLETED | OUTPATIENT
Start: 2022-05-03 | End: 2022-05-03

## 2022-05-03 RX ORDER — LANOLIN ALCOHOL/MO/W.PET/CERES
1 CREAM (GRAM) TOPICAL AT BEDTIME
Refills: 0 | Status: DISCONTINUED | OUTPATIENT
Start: 2022-05-03 | End: 2022-05-06

## 2022-05-03 RX ADMIN — ENOXAPARIN SODIUM 40 MILLIGRAM(S): 100 INJECTION SUBCUTANEOUS at 09:43

## 2022-05-03 RX ADMIN — SIMETHICONE 80 MILLIGRAM(S): 80 TABLET, CHEWABLE ORAL at 04:37

## 2022-05-03 RX ADMIN — Medication 1000 MILLIGRAM(S): at 13:07

## 2022-05-03 RX ADMIN — Medication 1000 MILLIGRAM(S): at 00:05

## 2022-05-03 RX ADMIN — Medication 1000 MILLIGRAM(S): at 05:30

## 2022-05-03 RX ADMIN — Medication 25 GRAM(S): at 10:36

## 2022-05-03 RX ADMIN — PIPERACILLIN AND TAZOBACTAM 200 GRAM(S): 4; .5 INJECTION, POWDER, LYOPHILIZED, FOR SOLUTION INTRAVENOUS at 22:09

## 2022-05-03 RX ADMIN — SODIUM CHLORIDE 125 MILLILITER(S): 9 INJECTION, SOLUTION INTRAVENOUS at 22:09

## 2022-05-03 RX ADMIN — Medication 1000 MILLIGRAM(S): at 18:29

## 2022-05-03 RX ADMIN — SIMETHICONE 80 MILLIGRAM(S): 80 TABLET, CHEWABLE ORAL at 22:09

## 2022-05-03 RX ADMIN — Medication 1000 MILLIGRAM(S): at 04:37

## 2022-05-03 RX ADMIN — Medication 20 MILLIEQUIVALENT(S): at 09:43

## 2022-05-03 RX ADMIN — Medication 600 MILLIGRAM(S): at 23:10

## 2022-05-03 RX ADMIN — Medication 600 MILLIGRAM(S): at 22:08

## 2022-05-03 NOTE — PROGRESS NOTE ADULT - ASSESSMENT
36 y.o. otherwise healthy female pt. Now POD#3 from endometriosis excision, myomectomy, cysto, D+C, 2 small full thickness rectal defects repaired by general surgery.    continue LRD  Care per primary  Team 1C to continue to follow  [phone]

## 2022-05-03 NOTE — PROVIDER CONTACT NOTE (CHANGE IN STATUS NOTIFICATION) - ACTION/TREATMENT ORDERED:
Pt already received Tylenol, given ice packs and encouraged increased fluid intake. Blood cultures ordered and taken. Will re-assess post ice packs

## 2022-05-03 NOTE — PROGRESS NOTE ADULT - SUBJECTIVE AND OBJECTIVE BOX
slight improvement  AVSS  Labs stable  Abd soft sl distention    OOB  Pulmonary toilet  Will follow while hospitalized

## 2022-05-03 NOTE — PROVIDER CONTACT NOTE (CHANGE IN STATUS NOTIFICATION) - SITUATION
Pt originally had a low grade fever of 100.3F. Given Tylenol. On reassessment fever increased to 101F.

## 2022-05-03 NOTE — PROGRESS NOTE ADULT - SUBJECTIVE AND OBJECTIVE BOX
SUBJECTIVE: Pt seen and examined at bedside with chief. Pt complaints of feeling bloated. Tolerating diet without N/V. Admits to BF     MEDICATIONS  (STANDING):  acetaminophen     Tablet .. 1000 milliGRAM(s) Oral every 6 hours  enoxaparin Injectable 40 milliGRAM(s) SubCutaneous every 24 hours  loratadine 10 milliGRAM(s) Oral daily  simethicone 80 milliGRAM(s) Chew every 6 hours    MEDICATIONS  (PRN):  acetaminophen     Tablet .. 1000 milliGRAM(s) Oral every 6 hours PRN Mild Pain (1 - 3)  HYDROmorphone  Injectable 0.5 milliGRAM(s) IV Push every 15 minutes PRN Severe Pain (7 - 10)  ibuprofen  Tablet. 600 milliGRAM(s) Oral every 6 hours PRN Mild Pain (1 - 3)  ibuprofen  Tablet. 600 milliGRAM(s) Oral every 6 hours PRN Mild Pain (1-3)  ondansetron Injectable 8 milliGRAM(s) IV Push every 8 hours PRN Nausea and/or Vomiting  oxyCODONE    IR 5 milliGRAM(s) Oral every 3 hours PRN Moderate Pain (4 - 6)  oxyCODONE    IR 10 milliGRAM(s) Oral every 4 hours PRN Severe Pain (7 - 10)      Vital Signs Last 24 Hrs  T(C): 37.2 (03 May 2022 05:00), Max: 37.4 (02 May 2022 20:50)  T(F): 99 (03 May 2022 05:00), Max: 99.3 (02 May 2022 20:50)  HR: 96 (03 May 2022 05:00) (68 - 96)  BP: 115/71 (03 May 2022 05:00) (100/66 - 122/80)  BP(mean): 86 (03 May 2022 05:00) (86 - 86)  RR: 17 (03 May 2022 05:00) (16 - 18)  SpO2: 98% (03 May 2022 05:00) (98% - 99%)    PHYSICAL EXAM:      Constitutional: A&Ox3    Respiratory: non labored breathing, no respiratory distress    Cardiovascular: NSR, RRR    Gastrointestinal: soft, mildly distended. appropriately tender                 Incision: CDI    Genitourinary: Voiding     Extremities: (-) edema                  I&O's Detail    02 May 2022 07:01  -  03 May 2022 07:00  --------------------------------------------------------  IN:    Oral Fluid: 1560 mL  Total IN: 1560 mL    OUT:    Voided (mL): 2850 mL  Total OUT: 2850 mL    Total NET: -1290 mL          LABS:                        9.4    7.52  )-----------( 252      ( 03 May 2022 05:49 )             29.8     05-03    136  |  102  |  6<L>  ----------------------------<  102<H>  3.8   |  26  |  0.71    Ca    9.0      03 May 2022 05:49  Phos  3.6     05-03  Mg     1.7     05-03            RADIOLOGY & ADDITIONAL STUDIES:

## 2022-05-03 NOTE — PROGRESS NOTE ADULT - SUBJECTIVE AND OBJECTIVE BOX
Pt seen and examined at bedside. Pt states mild abdominal pain with feelings of gas and bloating overnight. Pt [x] ambulating, tolerating LRD diet, [+] flatus,  [+] urinating adequately.   Pt denies fever, chills, chest pain, SOB, nausea, vomiting, lightheadedness, dizziness.      T(F): 99 (05-03-22 @ 05:00), Max: 99.3 (05-02-22 @ 20:50)  HR: 96 (05-03-22 @ 05:00) (68 - 96)  BP: 115/71 (05-03-22 @ 05:00) (100/66 - 122/80)  RR: 17 (05-03-22 @ 05:00) (16 - 18)  SpO2: 98% (05-03-22 @ 05:00) (98% - 99%)  Wt(kg): --  I&O's Summary    02 May 2022 07:01  -  03 May 2022 07:00  --------------------------------------------------------  IN: 1560 mL / OUT: 2850 mL / NET: -1290 mL        MEDICATIONS  (STANDING):  acetaminophen     Tablet .. 1000 milliGRAM(s) Oral every 6 hours  enoxaparin Injectable 40 milliGRAM(s) SubCutaneous every 24 hours  loratadine 10 milliGRAM(s) Oral daily  simethicone 80 milliGRAM(s) Chew every 6 hours    MEDICATIONS  (PRN):  acetaminophen     Tablet .. 1000 milliGRAM(s) Oral every 6 hours PRN Mild Pain (1 - 3)  HYDROmorphone  Injectable 0.5 milliGRAM(s) IV Push every 15 minutes PRN Severe Pain (7 - 10)  ibuprofen  Tablet. 600 milliGRAM(s) Oral every 6 hours PRN Mild Pain (1 - 3)  ibuprofen  Tablet. 600 milliGRAM(s) Oral every 6 hours PRN Mild Pain (1-3)  ondansetron Injectable 8 milliGRAM(s) IV Push every 8 hours PRN Nausea and/or Vomiting  oxyCODONE    IR 5 milliGRAM(s) Oral every 3 hours PRN Moderate Pain (4 - 6)  oxyCODONE    IR 10 milliGRAM(s) Oral every 4 hours PRN Severe Pain (7 - 10)      Physical Exam:  Constitutional: NAD  Pulmonary: no increased work of breathing  Cardiovascular: Regular rate and rhythm   Abdomen: incision sites clean, dry, intact. Soft, mildly tender, [moderately] distended, no guarding, no rebound, [+] bowel sounds  Extremities: no lower extremity edema or calf tenderness. SCDs in place     LABS:                        9.4    7.52  )-----------( 252      ( 03 May 2022 05:49 )             29.8     05-03    136  |  102  |  6<L>  ----------------------------<  102<H>  3.8   |  26  |  0.71    Ca    9.0      03 May 2022 05:49  Phos  3.6     05-03  Mg     1.7     05-03            RADIOLOGY & ADDITIONAL TESTS:

## 2022-05-03 NOTE — PROGRESS NOTE ADULT - ASSESSMENT
36 y.o. G0 POD4  s/p r/a endometriosis excision, myomectomy, cysto, D+C, 2 small full thickness rectal defects repaired by general surgery    Neuro: tylenol/toradol, oxy prn  CV: heplocked; cardiology following for episode of chest pain POD1, now resolved. Negative troponins, and low suspicion for ischemic cardiac etiology not requiring echo per cardiology.   Pulm: RA  FEN/GI: tolerating LRD, f/u general surgery recommendations. ,Zofran prn, simethicone atc  : voiding   VTE prophylaxis: SCDs, lovenox

## 2022-05-04 LAB
ALBUMIN SERPL ELPH-MCNC: 3.3 G/DL — SIGNIFICANT CHANGE UP (ref 3.3–5)
ALP SERPL-CCNC: 73 U/L — SIGNIFICANT CHANGE UP (ref 40–120)
ALT FLD-CCNC: 40 U/L — SIGNIFICANT CHANGE UP (ref 10–45)
ANION GAP SERPL CALC-SCNC: 8 MMOL/L — SIGNIFICANT CHANGE UP (ref 5–17)
APPEARANCE UR: CLEAR — SIGNIFICANT CHANGE UP
AST SERPL-CCNC: 42 U/L — HIGH (ref 10–40)
BACTERIA # UR AUTO: PRESENT /HPF
BILIRUB SERPL-MCNC: 0.7 MG/DL — SIGNIFICANT CHANGE UP (ref 0.2–1.2)
BILIRUB UR-MCNC: NEGATIVE — SIGNIFICANT CHANGE UP
BUN SERPL-MCNC: 4 MG/DL — LOW (ref 7–23)
CALCIUM SERPL-MCNC: 9.1 MG/DL — SIGNIFICANT CHANGE UP (ref 8.4–10.5)
CHLORIDE SERPL-SCNC: 104 MMOL/L — SIGNIFICANT CHANGE UP (ref 96–108)
CO2 SERPL-SCNC: 26 MMOL/L — SIGNIFICANT CHANGE UP (ref 22–31)
COLOR SPEC: YELLOW — SIGNIFICANT CHANGE UP
COMMENT - URINE: SIGNIFICANT CHANGE UP
COMMENT - URINE: SIGNIFICANT CHANGE UP
CREAT SERPL-MCNC: 0.72 MG/DL — SIGNIFICANT CHANGE UP (ref 0.5–1.3)
DIFF PNL FLD: ABNORMAL
EGFR: 110 ML/MIN/1.73M2 — SIGNIFICANT CHANGE UP
EPI CELLS # UR: SIGNIFICANT CHANGE UP /HPF (ref 0–5)
GLUCOSE SERPL-MCNC: 100 MG/DL — HIGH (ref 70–99)
GLUCOSE UR QL: NEGATIVE — SIGNIFICANT CHANGE UP
HCT VFR BLD CALC: 29.1 % — LOW (ref 34.5–45)
HGB BLD-MCNC: 9.4 G/DL — LOW (ref 11.5–15.5)
KETONES UR-MCNC: ABNORMAL MG/DL
LEUKOCYTE ESTERASE UR-ACNC: NEGATIVE — SIGNIFICANT CHANGE UP
MAGNESIUM SERPL-MCNC: 2 MG/DL — SIGNIFICANT CHANGE UP (ref 1.6–2.6)
MCHC RBC-ENTMCNC: 23.8 PG — LOW (ref 27–34)
MCHC RBC-ENTMCNC: 32.3 GM/DL — SIGNIFICANT CHANGE UP (ref 32–36)
MCV RBC AUTO: 73.7 FL — LOW (ref 80–100)
NITRITE UR-MCNC: NEGATIVE — SIGNIFICANT CHANGE UP
NRBC # BLD: 0 /100 WBCS — SIGNIFICANT CHANGE UP (ref 0–0)
PH UR: 6.5 — SIGNIFICANT CHANGE UP (ref 5–8)
PHOSPHATE SERPL-MCNC: 4.6 MG/DL — HIGH (ref 2.5–4.5)
PLATELET # BLD AUTO: 243 K/UL — SIGNIFICANT CHANGE UP (ref 150–400)
POTASSIUM SERPL-MCNC: 4 MMOL/L — SIGNIFICANT CHANGE UP (ref 3.5–5.3)
POTASSIUM SERPL-SCNC: 4 MMOL/L — SIGNIFICANT CHANGE UP (ref 3.5–5.3)
PROT SERPL-MCNC: 6.3 G/DL — SIGNIFICANT CHANGE UP (ref 6–8.3)
PROT UR-MCNC: ABNORMAL MG/DL
RBC # BLD: 3.95 M/UL — SIGNIFICANT CHANGE UP (ref 3.8–5.2)
RBC # FLD: 14.2 % — SIGNIFICANT CHANGE UP (ref 10.3–14.5)
RBC CASTS # UR COMP ASSIST: ABNORMAL /HPF
SODIUM SERPL-SCNC: 138 MMOL/L — SIGNIFICANT CHANGE UP (ref 135–145)
SP GR SPEC: 1.02 — SIGNIFICANT CHANGE UP (ref 1–1.03)
UROBILINOGEN FLD QL: 1 E.U./DL — SIGNIFICANT CHANGE UP
WBC # BLD: 8.94 K/UL — SIGNIFICANT CHANGE UP (ref 3.8–10.5)
WBC # FLD AUTO: 8.94 K/UL — SIGNIFICANT CHANGE UP (ref 3.8–10.5)
WBC UR QL: ABNORMAL /HPF

## 2022-05-04 PROCEDURE — 93306 TTE W/DOPPLER COMPLETE: CPT | Mod: 26

## 2022-05-04 PROCEDURE — 93970 EXTREMITY STUDY: CPT | Mod: 26

## 2022-05-04 RX ORDER — ACETAMINOPHEN 500 MG
1000 TABLET ORAL EVERY 6 HOURS
Refills: 0 | Status: DISCONTINUED | OUTPATIENT
Start: 2022-05-05 | End: 2022-05-05

## 2022-05-04 RX ORDER — IBUPROFEN 200 MG
600 TABLET ORAL EVERY 6 HOURS
Refills: 0 | Status: DISCONTINUED | OUTPATIENT
Start: 2022-05-05 | End: 2022-05-05

## 2022-05-04 RX ADMIN — PIPERACILLIN AND TAZOBACTAM 200 GRAM(S): 4; .5 INJECTION, POWDER, LYOPHILIZED, FOR SOLUTION INTRAVENOUS at 10:11

## 2022-05-04 RX ADMIN — ONDANSETRON 8 MILLIGRAM(S): 8 TABLET, FILM COATED ORAL at 06:17

## 2022-05-04 RX ADMIN — PIPERACILLIN AND TAZOBACTAM 200 GRAM(S): 4; .5 INJECTION, POWDER, LYOPHILIZED, FOR SOLUTION INTRAVENOUS at 03:34

## 2022-05-04 RX ADMIN — Medication 1000 MILLIGRAM(S): at 12:32

## 2022-05-04 RX ADMIN — Medication 1000 MILLIGRAM(S): at 01:11

## 2022-05-04 RX ADMIN — Medication 1 MILLIGRAM(S): at 23:25

## 2022-05-04 RX ADMIN — Medication 1000 MILLIGRAM(S): at 02:10

## 2022-05-04 RX ADMIN — ENOXAPARIN SODIUM 40 MILLIGRAM(S): 100 INJECTION SUBCUTANEOUS at 10:11

## 2022-05-04 RX ADMIN — Medication 1000 MILLIGRAM(S): at 18:36

## 2022-05-04 RX ADMIN — Medication 600 MILLIGRAM(S): at 19:55

## 2022-05-04 RX ADMIN — Medication 1000 MILLIGRAM(S): at 19:55

## 2022-05-04 RX ADMIN — Medication 600 MILLIGRAM(S): at 18:24

## 2022-05-04 RX ADMIN — Medication 1000 MILLIGRAM(S): at 13:30

## 2022-05-04 NOTE — DIETITIAN INITIAL EVALUATION ADULT - OTHER CALCULATIONS
lbs. IBW used to calculate energy needs due to pt's current body weight exceeding 120% of IBW (123%). Needs adjusted for age and wt, increased protein needs for postop demands

## 2022-05-04 NOTE — PROGRESS NOTE ADULT - SUBJECTIVE AND OBJECTIVE BOX
FEVER AND TACHYCARDIA LAST NIGHT  CT and CXR with no source of fever  now feels improved and AVSS  ABD soft less distended    OOB  pulmonary toilet  Will follow

## 2022-05-04 NOTE — PROGRESS NOTE ADULT - ASSESSMENT
36 y.o. G0 POD5  s/p r/a endometriosis excision, myomectomy, cysto, D+C, 2 small full thickness rectal defects repaired by general surgery    Neuro: tylenol/toradol, oxy prn  CV: heplocked; cardiology following for episode of chest pain POD1, now resolved. Negative troponins, and low suspicion for ischemic cardiac etiology not requiring echo per cardiology.   Pulm: RA  ID: febrile to 102 5/4 - BCx drawn, UCx collected and to be followed up. General surgery recommended CXR as well as CT A/P with contrast - prelim read negative for rectal leak, f/u final read.   FEN/GI: now NPO with improved distension after rectal contrast;, f/u general surgery recommendations. ,Zofran prn, simethicone atc  : voiding   VTE prophylaxis: SCDs, lovenox

## 2022-05-04 NOTE — DIETITIAN INITIAL EVALUATION ADULT - PERTINENT LABORATORY DATA
05-04    138  |  104  |  4<L>  ----------------------------<  100<H>  4.0   |  26  |  0.72    Ca    9.1      04 May 2022 05:59  Phos  4.6     05-04  Mg     2.0     05-04    TPro  6.3  /  Alb  3.3  /  TBili  0.7  /  DBili  x   /  AST  42<H>  /  ALT  40  /  AlkPhos  73  05-04

## 2022-05-04 NOTE — PROGRESS NOTE ADULT - SUBJECTIVE AND OBJECTIVE BOX
Pt seen and examined at bedside. Pt states mild abdominal pain. Pt [x] ambulating, now NPO; [x] flatus,  [x] urinating adequately.   Pt denies fever, chills, chest pain, SOB, nausea, vomiting, lightheadedness, dizziness.      T(F): 98.7 (05-04-22 @ 04:41), Max: 102.3 (05-03-22 @ 18:24)  HR: 76 (05-04-22 @ 04:41) (73 - 102)  BP: 98/65 (05-04-22 @ 04:41) (98/65 - 128/79)  RR: 18 (05-04-22 @ 04:41) (16 - 19)  SpO2: 98% (05-04-22 @ 04:41) (96% - 99%)  Wt(kg): --  I&O's Summary    02 May 2022 07:01  -  03 May 2022 07:00  --------------------------------------------------------  IN: 1560 mL / OUT: 2850 mL / NET: -1290 mL    03 May 2022 07:01  -  04 May 2022 06:42  --------------------------------------------------------  IN: 2100 mL / OUT: 2850 mL / NET: -750 mL        MEDICATIONS  (STANDING):  acetaminophen     Tablet .. 1000 milliGRAM(s) Oral every 6 hours  enoxaparin Injectable 40 milliGRAM(s) SubCutaneous every 24 hours  iohexol 300 mG (iodine)/mL Oral Solution 30 milliLiter(s) Oral once  lactated ringers. 1000 milliLiter(s) (125 mL/Hr) IV Continuous <Continuous>  loratadine 10 milliGRAM(s) Oral daily  melatonin 1 milliGRAM(s) Oral at bedtime  piperacillin/tazobactam IVPB.. 3.375 Gram(s) IV Intermittent every 6 hours  simethicone 80 milliGRAM(s) Chew every 6 hours    MEDICATIONS  (PRN):  acetaminophen     Tablet .. 1000 milliGRAM(s) Oral every 6 hours PRN Mild Pain (1 - 3)  HYDROmorphone  Injectable 0.5 milliGRAM(s) IV Push every 15 minutes PRN Severe Pain (7 - 10)  ibuprofen  Tablet. 600 milliGRAM(s) Oral every 6 hours PRN Mild Pain (1 - 3)  ibuprofen  Tablet. 600 milliGRAM(s) Oral every 6 hours PRN Mild Pain (1-3)  ondansetron Injectable 8 milliGRAM(s) IV Push every 8 hours PRN Nausea and/or Vomiting  oxyCODONE    IR 5 milliGRAM(s) Oral every 3 hours PRN Moderate Pain (4 - 6)  oxyCODONE    IR 10 milliGRAM(s) Oral every 4 hours PRN Severe Pain (7 - 10)      Physical Exam:  Constitutional: NAD  Pulmonary: no increased work of breathing  Cardiovascular: Regular rate and rhythm   Abdomen: incision sites clean, dry, intact. Soft, mildly tender, [mild- improved] distended, no guarding, no rebound, [+] bowel sounds  Extremities: no lower extremity edema or calf tenderness. SCDs in place     LABS:                        9.4    8.94  )-----------( 243      ( 04 May 2022 06:00 )             29.1     05-04    138  |  104  |  4<L>  ----------------------------<  100<H>  4.0   |  26  |  0.72    Ca    9.1      04 May 2022 05:59  Phos  4.6     05-04  Mg     2.0     05-04    TPro  6.3  /  Alb  3.3  /  TBili  0.7  /  DBili  x   /  AST  42<H>  /  ALT  40  /  AlkPhos  73  05-04          RADIOLOGY & ADDITIONAL TESTS:

## 2022-05-04 NOTE — DIETITIAN INITIAL EVALUATION ADULT - ADD RECOMMEND
1. Continue with low fiber diet, rec include [id number] (240 kcals, 8 g protein, 199 mls) to trial  2. Monitor %PO intake, encourage PO as able  >> honor pt food preferences  3. Monitor lytes, replete prn  4. diet edu review prn

## 2022-05-04 NOTE — DIETITIAN INITIAL EVALUATION ADULT - PERTINENT MEDS FT
MEDICATIONS  (STANDING):  acetaminophen     Tablet .. 1000 milliGRAM(s) Oral every 6 hours  enoxaparin Injectable 40 milliGRAM(s) SubCutaneous every 24 hours  iohexol 300 mG (iodine)/mL Oral Solution 30 milliLiter(s) Oral once  loratadine 10 milliGRAM(s) Oral daily  melatonin 1 milliGRAM(s) Oral at bedtime  simethicone 80 milliGRAM(s) Chew every 6 hours    MEDICATIONS  (PRN):  acetaminophen     Tablet .. 1000 milliGRAM(s) Oral every 6 hours PRN Mild Pain (1 - 3)  HYDROmorphone  Injectable 0.5 milliGRAM(s) IV Push every 15 minutes PRN Severe Pain (7 - 10)  ibuprofen  Tablet. 600 milliGRAM(s) Oral every 6 hours PRN Mild Pain (1 - 3)  ibuprofen  Tablet. 600 milliGRAM(s) Oral every 6 hours PRN Mild Pain (1-3)  ondansetron Injectable 8 milliGRAM(s) IV Push every 8 hours PRN Nausea and/or Vomiting  oxyCODONE    IR 5 milliGRAM(s) Oral every 3 hours PRN Moderate Pain (4 - 6)  oxyCODONE    IR 10 milliGRAM(s) Oral every 4 hours PRN Severe Pain (7 - 10)

## 2022-05-04 NOTE — CHART NOTE - NSCHARTNOTEFT_GEN_A_CORE
S: Pt has no complaints. Denies abdominal pain, CP, SOB, ROBERTSON, calf tenderness. Pain controlled with medication. Pt had large BM after CT AP w rectal contrast.    O:  T(C): 37.2 (05-04-22 @ 00:00), Max: 37.2 (05-04-22 @ 00:00)  T(F): 98.9 (05-04-22 @ 00:00), Max: 98.9 (05-04-22 @ 00:00)  HR: 94 (05-04-22 @ 00:00) (94 - 94)  BP: 107/71 (05-04-22 @ 00:00) (107/71 - 107/71)  RR: 18 (05-04-22 @ 00:00) (18 - 18)  SpO2: 98% (05-04-22 @ 00:00) (98% - 98%)               Gen: NAD, resting comfortably in bed  C/V: NSR  Pulm: Nonlabored breathing, no respiratory distress  Abd: soft, minimally distended, non tender in all quadrants, incisions c/d/i  Extrem: WWP, no calf edema, SCDs in place

## 2022-05-04 NOTE — DIETITIAN INITIAL EVALUATION ADULT - OTHER INFO
37 y/o female, with no PMHx except endometriosis, is now s/p r/a endometriosis excision, myomectomy, cysto, D+C, 2 small full thickness rectal defects repaired by general surgery on 4/29. Febrile 5/4, CT and CXR with no source of fever. CT A/p with contrast, neg for rectal leak, pending final read.     Pt seen resting on chair, endorses fatigue, and tiredness, wanting to sleep. Reports not changes in wt. Endorses lack of appetite PTA but has been forcing PO intake with understanding need for adequate PO. Remains with poor appetite at this time. Was NPO this morning, changed to low fiber diet, reports not feeling hungry, normally would eat fruits and soup, wanting something light to eat. Encouraged PO intake as able. Discussed importance of low fiber diet at this time. Pt verbalized understanding. Noted bad discomfort, distended, now with improvement. No n/v/d/c. + BM today. Skin: Rashid 20, surgical incision noted. RD to follow per protocol.

## 2022-05-05 LAB
ANION GAP SERPL CALC-SCNC: 13 MMOL/L — SIGNIFICANT CHANGE UP (ref 5–17)
BASOPHILS # BLD AUTO: 0.01 K/UL — SIGNIFICANT CHANGE UP (ref 0–0.2)
BASOPHILS NFR BLD AUTO: 0.1 % — SIGNIFICANT CHANGE UP (ref 0–2)
BUN SERPL-MCNC: 6 MG/DL — LOW (ref 7–23)
CALCIUM SERPL-MCNC: 9.5 MG/DL — SIGNIFICANT CHANGE UP (ref 8.4–10.5)
CHLORIDE SERPL-SCNC: 105 MMOL/L — SIGNIFICANT CHANGE UP (ref 96–108)
CO2 SERPL-SCNC: 25 MMOL/L — SIGNIFICANT CHANGE UP (ref 22–31)
CREAT SERPL-MCNC: 0.62 MG/DL — SIGNIFICANT CHANGE UP (ref 0.5–1.3)
CULTURE RESULTS: SIGNIFICANT CHANGE UP
EGFR: 118 ML/MIN/1.73M2 — SIGNIFICANT CHANGE UP
EOSINOPHIL # BLD AUTO: 0.07 K/UL — SIGNIFICANT CHANGE UP (ref 0–0.5)
EOSINOPHIL NFR BLD AUTO: 0.7 % — SIGNIFICANT CHANGE UP (ref 0–6)
GLUCOSE SERPL-MCNC: 93 MG/DL — SIGNIFICANT CHANGE UP (ref 70–99)
HCT VFR BLD CALC: 29.9 % — LOW (ref 34.5–45)
HGB BLD-MCNC: 9.6 G/DL — LOW (ref 11.5–15.5)
IMM GRANULOCYTES NFR BLD AUTO: 0.3 % — SIGNIFICANT CHANGE UP (ref 0–1.5)
LYMPHOCYTES # BLD AUTO: 1.34 K/UL — SIGNIFICANT CHANGE UP (ref 1–3.3)
LYMPHOCYTES # BLD AUTO: 13.7 % — SIGNIFICANT CHANGE UP (ref 13–44)
MAGNESIUM SERPL-MCNC: 1.8 MG/DL — SIGNIFICANT CHANGE UP (ref 1.6–2.6)
MCHC RBC-ENTMCNC: 23.6 PG — LOW (ref 27–34)
MCHC RBC-ENTMCNC: 32.1 GM/DL — SIGNIFICANT CHANGE UP (ref 32–36)
MCV RBC AUTO: 73.5 FL — LOW (ref 80–100)
MONOCYTES # BLD AUTO: 0.68 K/UL — SIGNIFICANT CHANGE UP (ref 0–0.9)
MONOCYTES NFR BLD AUTO: 6.9 % — SIGNIFICANT CHANGE UP (ref 2–14)
NEUTROPHILS # BLD AUTO: 7.67 K/UL — HIGH (ref 1.8–7.4)
NEUTROPHILS NFR BLD AUTO: 78.3 % — HIGH (ref 43–77)
NRBC # BLD: 0 /100 WBCS — SIGNIFICANT CHANGE UP (ref 0–0)
PHOSPHATE SERPL-MCNC: 4.1 MG/DL — SIGNIFICANT CHANGE UP (ref 2.5–4.5)
PLATELET # BLD AUTO: 259 K/UL — SIGNIFICANT CHANGE UP (ref 150–400)
POTASSIUM SERPL-MCNC: 4.1 MMOL/L — SIGNIFICANT CHANGE UP (ref 3.5–5.3)
POTASSIUM SERPL-SCNC: 4.1 MMOL/L — SIGNIFICANT CHANGE UP (ref 3.5–5.3)
RAPID RVP RESULT: SIGNIFICANT CHANGE UP
RBC # BLD: 4.07 M/UL — SIGNIFICANT CHANGE UP (ref 3.8–5.2)
RBC # FLD: 14.4 % — SIGNIFICANT CHANGE UP (ref 10.3–14.5)
SARS-COV-2 RNA SPEC QL NAA+PROBE: SIGNIFICANT CHANGE UP
SARS-COV-2 RNA SPEC QL NAA+PROBE: SIGNIFICANT CHANGE UP
SODIUM SERPL-SCNC: 143 MMOL/L — SIGNIFICANT CHANGE UP (ref 135–145)
SPECIMEN SOURCE: SIGNIFICANT CHANGE UP
WBC # BLD: 9.8 K/UL — SIGNIFICANT CHANGE UP (ref 3.8–10.5)
WBC # FLD AUTO: 9.8 K/UL — SIGNIFICANT CHANGE UP (ref 3.8–10.5)

## 2022-05-05 PROCEDURE — 99254 IP/OBS CNSLTJ NEW/EST MOD 60: CPT

## 2022-05-05 RX ORDER — MAGNESIUM SULFATE 500 MG/ML
1 VIAL (ML) INJECTION ONCE
Refills: 0 | Status: COMPLETED | OUTPATIENT
Start: 2022-05-05 | End: 2022-05-05

## 2022-05-05 RX ORDER — ACETAMINOPHEN 500 MG
650 TABLET ORAL EVERY 6 HOURS
Refills: 0 | Status: DISCONTINUED | OUTPATIENT
Start: 2022-05-05 | End: 2022-05-06

## 2022-05-05 RX ORDER — IBUPROFEN 200 MG
600 TABLET ORAL EVERY 6 HOURS
Refills: 0 | Status: DISCONTINUED | OUTPATIENT
Start: 2022-05-05 | End: 2022-05-06

## 2022-05-05 RX ADMIN — Medication 1000 MILLIGRAM(S): at 05:59

## 2022-05-05 RX ADMIN — ENOXAPARIN SODIUM 40 MILLIGRAM(S): 100 INJECTION SUBCUTANEOUS at 09:04

## 2022-05-05 RX ADMIN — Medication 1000 MILLIGRAM(S): at 00:29

## 2022-05-05 RX ADMIN — Medication 1000 MILLIGRAM(S): at 00:59

## 2022-05-05 RX ADMIN — ONDANSETRON 8 MILLIGRAM(S): 8 TABLET, FILM COATED ORAL at 05:44

## 2022-05-05 RX ADMIN — Medication 600 MILLIGRAM(S): at 13:30

## 2022-05-05 RX ADMIN — Medication 1 MILLIGRAM(S): at 22:31

## 2022-05-05 RX ADMIN — SIMETHICONE 80 MILLIGRAM(S): 80 TABLET, CHEWABLE ORAL at 05:29

## 2022-05-05 RX ADMIN — Medication 1000 MILLIGRAM(S): at 05:29

## 2022-05-05 RX ADMIN — Medication 650 MILLIGRAM(S): at 23:10

## 2022-05-05 RX ADMIN — Medication 650 MILLIGRAM(S): at 22:31

## 2022-05-05 RX ADMIN — Medication 600 MILLIGRAM(S): at 13:00

## 2022-05-05 NOTE — PROGRESS NOTE ADULT - ASSESSMENT
36 y.o. G0 POD6  s/p r/a endometriosis excision, myomectomy, cysto, D+C, 2 small full thickness rectal defects repaired by general surgery    Neuro: tylenol/toradol, oxy prn  CV: heplocked; cardiology following for episode of chest pain POD1, now resolved. Negative troponins, and low suspicion for ischemic cardiac etiology not requiring echo per cardiology.   Pulm: RA  ID: febrile to 102 5/4 - BCx drawn, UCx collected and to be followed up. General surgery recommended CXR as well as CT A/P with contrast - negative for rectal leak or abscess. Pt now s/p empiric zosyn  5/3-5/4; Febrile overnight again to 101.4, afebrile after tylenol. Covid negative (5/4), LE dopplers prelim negative (5/4). Will consult ID today   FEN/GI: LRD;, f/u general surgery recommendations. ,Zofran prn, simethicone atc  : voiding   VTE prophylaxis: SCDs, lovenox

## 2022-05-05 NOTE — CONSULT NOTE ADULT - ASSESSMENT
IMPRESSION:  Recurrent fevers without clear source. She overall appears non-toxic.  She reports some diarrhea today. Only other focal complaints was rhinorrhea which has since resolved.  CT scan without abscess.  There is some hemorrhagic fluid which can cause fever.  Would hold off on treating the urine culture as she has no symptoms     Recommend:  1.  Hold antibiotics at this time  2.  Check respiratory viral panel (RVP)  3.  If diarrhea continues, check C. Diff  4.  Check serum procalcitonin with next blood draw    ID team 1 will follow

## 2022-05-05 NOTE — PROGRESS NOTE ADULT - SUBJECTIVE AND OBJECTIVE BOX
feeling improved  avss  abd soft  less distended  less tender  labs OK  fever w/u so far negative with one of urine culture with 80,000 enterococcus--? source   will discuss

## 2022-05-05 NOTE — CONSULT NOTE ADULT - SUBJECTIVE AND OBJECTIVE BOX
HPI:  Patient is a 36y y.o. G0 presents for scheduled r/a Scheduled endometriosis excision. Patient has a hx of parital right oopherectomy 2010 due to endometrioma, b/l salpingectomy 2018. Denies chest pain, palpitations, SOB, n/v, diarrhea, constipation.    Patient underwent the procedure on  without immediate complications.   She remained in house due to constipation.  On 5/3 she had a fever > 101. It resolved with tylenol and then happened again on .  At present time she does not have dysuria or change in her urine.  She does not have chest pain, cough or SOB.  She reports some sneezing and runny nose 2 days ago that has resolved.  She reports 3 episodes of diarrhea that started this morning (watery, non-bloody)          REVIEW OF SYSTEMS:    General:	 no weakness; no fevers, no chills  Skin/Breast: no rash  Respiratory and Thorax: no SOB, no cough  Cardiovascular:	No chest pain  Gastrointestinal:	 no nausea, vomiting , diarrhea  Genitourinary:	no dysuria, no difficulty urinating, no hematuria  Musculoskeletal:	no weakness, no joint swelling/pain  Neurological:	no focal weakness/numbness  Endocrine:	no polyuria, no polydipsia      ANTIBIOTICS:  MEDICATIONS  (STANDING):  acetaminophen     Tablet .. 1000 milliGRAM(s) Oral every 6 hours  enoxaparin Injectable 40 milliGRAM(s) SubCutaneous every 24 hours  ibuprofen  Tablet. 600 milliGRAM(s) Oral every 6 hours  iohexol 300 mG (iodine)/mL Oral Solution 30 milliLiter(s) Oral once  loratadine 10 milliGRAM(s) Oral daily  melatonin 1 milliGRAM(s) Oral at bedtime  simethicone 80 milliGRAM(s) Chew every 6 hours    MEDICATIONS  (PRN):  ondansetron Injectable 8 milliGRAM(s) IV Push every 8 hours PRN Nausea and/or Vomiting  oxyCODONE    IR 5 milliGRAM(s) Oral every 3 hours PRN Moderate Pain (4 - 6)  oxyCODONE    IR 10 milliGRAM(s) Oral every 4 hours PRN Severe Pain (7 - 10)      Allergies    No Known Allergies    Intolerances        SOCIAL HISTORY:    FAMILY HISTORY:      Vital Signs Last 24 Hrs  T(C): 37.7 (05 May 2022 14:36), Max: 38.6 (04 May 2022 18:31)  T(F): 99.8 (05 May 2022 14:36), Max: 101.4 (04 May 2022 18:31)  HR: 96 (05 May 2022 14:36) (75 - 104)  BP: 98/62 (05 May 2022 14:36) (97/62 - 108/70)  BP(mean): --  RR: 18 (05 May 2022 14:36) (16 - 18)  SpO2: 100% (05 May 2022 14:36) (97% - 100%)    PHYSICAL EXAM:  Constitutional:  non-toxic, no distress  Eyes:  no icterus   Ear/Nose/Throat: no oral lesion, no sinus tenderness on percussion	  Neck:  supple  Respiratory: CTA kimberly  Cardiovascular: S1S2 RRR, no murmurs  Gastrointestinal:soft, (+) BS, no HSM  Extremities:no e/e/c  Vascular: DP Pulse:	right normal; left normal            LABS:                        9.6    9.80  )-----------( 259      ( 05 May 2022 07:07 )             29.9     05-05    143  |  105  |  6<L>  ----------------------------<  93  4.1   |  25  |  0.62    Ca    9.5      05 May 2022 07:07  Phos  4.1     05-05  Mg     1.8     05-05    TPro  6.3  /  Alb  3.3  /  TBili  0.7  /  DBili  x   /  AST  42<H>  /  ALT  40  /  AlkPhos  73  05-04      Urinalysis Basic - ( 04 May 2022 12:01 )    Color: Yellow / Appearance: Clear / S.025 / pH: x  Gluc: x / Ketone: Trace mg/dL  / Bili: Negative / Urobili: 1.0 E.U./dL   Blood: x / Protein: Trace mg/dL / Nitrite: NEGATIVE   Leuk Esterase: NEGATIVE / RBC: Many /HPF / WBC 5-10 /HPF   Sq Epi: x / Non Sq Epi: 0-5 /HPF / Bacteria: Present /HPF        MICROBIOLOGY:    Culture - Urine (22 @ 08:43)    Specimen Source: Clean Catch Clean Catch (Midstream)    Culture Results:   Less than 10,000 cols/cc; Insignificant amount of growth.    Culture - Urine (22 @ 08:43)    Specimen Source: Clean Catch Clean Catch (Midstream)    Culture Results:   80,000 CFU/ml Enterococcus faecalis Susceptibility to follow.  15,000 CFU/ml Corynebacterium amycolatum      RADIOLOGY & ADDITIONAL STUDIES:    < from: CT Abdomen and Pelvis w/ Oral Cont and w/ IV Cont (22 @ 20:39) >  . Area of low attenuation in the posterior aspect of the uterine fundus  presumed related to reported myomectomy.  2. A rectal tube is in place. Rectal contrast has been administered.  3. There is wall thickening involving the anterior aspect of the rectum   likely  at site of reported surgical repair.  4. The colon is distended with contrast. No extraluminal contrast is   identified  to suggest leak.  5. Small fluid, likely hemorrhagic products, and air present in the   operative  bed. No evidence of organized peripherally enhancing abscess collection.    < end of copied text >

## 2022-05-05 NOTE — PROGRESS NOTE ADULT - SUBJECTIVE AND OBJECTIVE BOX
Pt seen and examined at bedside. Pt states mild abdominal pain. Pt [x] ambulating, tolerating LR diet, [+] flatus/BM,  [+] urinating adequately.   Pt denies fever, chills, chest pain, SOB,  vomiting, lightheadedness, dizziness.  +mild nausea, no vomiting     T(F): 98.2 (22 @ 04:59), Max: 101.4 (22 @ 18:31)  HR: 80 (22 @ 04:59) (80 - 107)  BP: 102/60 (22 @ 04:59) (97/62 - 141/84)  RR: 16 (22 @ 04:59) (16 - 19)  SpO2: 98% (22 @ 04:59) (97% - 100%)  Wt(kg): --  I&O's Summary    03 May 2022 07:  -  04 May 2022 07:00  --------------------------------------------------------  IN: 2100 mL / OUT: 2850 mL / NET: -750 mL    04 May 2022 07:01  -  05 May 2022 06:58  --------------------------------------------------------  IN: 1085 mL / OUT: 1900 mL / NET: -815 mL        MEDICATIONS  (STANDING):  acetaminophen     Tablet .. 1000 milliGRAM(s) Oral every 6 hours  enoxaparin Injectable 40 milliGRAM(s) SubCutaneous every 24 hours  ibuprofen  Tablet. 600 milliGRAM(s) Oral every 6 hours  iohexol 300 mG (iodine)/mL Oral Solution 30 milliLiter(s) Oral once  loratadine 10 milliGRAM(s) Oral daily  melatonin 1 milliGRAM(s) Oral at bedtime  simethicone 80 milliGRAM(s) Chew every 6 hours    MEDICATIONS  (PRN):  ondansetron Injectable 8 milliGRAM(s) IV Push every 8 hours PRN Nausea and/or Vomiting  oxyCODONE    IR 5 milliGRAM(s) Oral every 3 hours PRN Moderate Pain (4 - 6)  oxyCODONE    IR 10 milliGRAM(s) Oral every 4 hours PRN Severe Pain (7 - 10)      Physical Exam:  Constitutional: NAD  Pulmonary: no increased work of breathing  Cardiovascular: Regular rate and rhythm   Abdomen: incision sites clean, dry, intact. Soft, mildly tender, [moderately] distended, no guarding, no rebound, [+] bowel sounds  Extremities: no lower extremity edema or calf tenderness. SCDs in place     LABS:                        9.4    8.94  )-----------( 243      ( 04 May 2022 06:00 )             29.1     05-04    138  |  104  |  4<L>  ----------------------------<  100<H>  4.0   |  26  |  0.72    Ca    9.1      04 May 2022 05:59  Phos  4.6     05-04  Mg     2.0     05-04    TPro  6.3  /  Alb  3.3  /  TBili  0.7  /  DBili  x   /  AST  42<H>  /  ALT  40  /  AlkPhos  73  05-04      Urinalysis Basic - ( 04 May 2022 12:01 )    Color: Yellow / Appearance: Clear / S.025 / pH: x  Gluc: x / Ketone: Trace mg/dL  / Bili: Negative / Urobili: 1.0 E.U./dL   Blood: x / Protein: Trace mg/dL / Nitrite: NEGATIVE   Leuk Esterase: NEGATIVE / RBC: Many /HPF / WBC 5-10 /HPF   Sq Epi: x / Non Sq Epi: 0-5 /HPF / Bacteria: Present /HPF        RADIOLOGY & ADDITIONAL TESTS:

## 2022-05-06 ENCOUNTER — TRANSCRIPTION ENCOUNTER (OUTPATIENT)
Age: 37
End: 2022-05-06

## 2022-05-06 VITALS
DIASTOLIC BLOOD PRESSURE: 71 MMHG | HEART RATE: 79 BPM | TEMPERATURE: 98 F | OXYGEN SATURATION: 98 % | RESPIRATION RATE: 17 BRPM | SYSTOLIC BLOOD PRESSURE: 105 MMHG

## 2022-05-06 LAB
-  AMPICILLIN: SIGNIFICANT CHANGE UP
-  CIPROFLOXACIN: SIGNIFICANT CHANGE UP
-  NITROFURANTOIN: SIGNIFICANT CHANGE UP
-  TETRACYCLINE: SIGNIFICANT CHANGE UP
-  VANCOMYCIN: SIGNIFICANT CHANGE UP
ALBUMIN SERPL ELPH-MCNC: 3.6 G/DL — SIGNIFICANT CHANGE UP (ref 3.3–5)
ALP SERPL-CCNC: 90 U/L — SIGNIFICANT CHANGE UP (ref 40–120)
ALT FLD-CCNC: 49 U/L — HIGH (ref 10–45)
ANION GAP SERPL CALC-SCNC: 9 MMOL/L — SIGNIFICANT CHANGE UP (ref 5–17)
AST SERPL-CCNC: 26 U/L — SIGNIFICANT CHANGE UP (ref 10–40)
BASOPHILS # BLD AUTO: 0.01 K/UL — SIGNIFICANT CHANGE UP (ref 0–0.2)
BASOPHILS NFR BLD AUTO: 0.1 % — SIGNIFICANT CHANGE UP (ref 0–2)
BILIRUB SERPL-MCNC: 0.3 MG/DL — SIGNIFICANT CHANGE UP (ref 0.2–1.2)
BUN SERPL-MCNC: 6 MG/DL — LOW (ref 7–23)
CALCIUM SERPL-MCNC: 9.3 MG/DL — SIGNIFICANT CHANGE UP (ref 8.4–10.5)
CHLORIDE SERPL-SCNC: 102 MMOL/L — SIGNIFICANT CHANGE UP (ref 96–108)
CO2 SERPL-SCNC: 26 MMOL/L — SIGNIFICANT CHANGE UP (ref 22–31)
CREAT SERPL-MCNC: 0.7 MG/DL — SIGNIFICANT CHANGE UP (ref 0.5–1.3)
CULTURE RESULTS: SIGNIFICANT CHANGE UP
EGFR: 114 ML/MIN/1.73M2 — SIGNIFICANT CHANGE UP
EOSINOPHIL # BLD AUTO: 0.07 K/UL — SIGNIFICANT CHANGE UP (ref 0–0.5)
EOSINOPHIL NFR BLD AUTO: 1 % — SIGNIFICANT CHANGE UP (ref 0–6)
GLUCOSE SERPL-MCNC: 88 MG/DL — SIGNIFICANT CHANGE UP (ref 70–99)
HCT VFR BLD CALC: 28.3 % — LOW (ref 34.5–45)
HGB BLD-MCNC: 9 G/DL — LOW (ref 11.5–15.5)
IMM GRANULOCYTES NFR BLD AUTO: 0.4 % — SIGNIFICANT CHANGE UP (ref 0–1.5)
LYMPHOCYTES # BLD AUTO: 1.18 K/UL — SIGNIFICANT CHANGE UP (ref 1–3.3)
LYMPHOCYTES # BLD AUTO: 16.8 % — SIGNIFICANT CHANGE UP (ref 13–44)
MAGNESIUM SERPL-MCNC: 2 MG/DL — SIGNIFICANT CHANGE UP (ref 1.6–2.6)
MCHC RBC-ENTMCNC: 22.6 PG — LOW (ref 27–34)
MCHC RBC-ENTMCNC: 31.8 GM/DL — LOW (ref 32–36)
MCV RBC AUTO: 70.9 FL — LOW (ref 80–100)
METHOD TYPE: SIGNIFICANT CHANGE UP
MONOCYTES # BLD AUTO: 0.69 K/UL — SIGNIFICANT CHANGE UP (ref 0–0.9)
MONOCYTES NFR BLD AUTO: 9.8 % — SIGNIFICANT CHANGE UP (ref 2–14)
NEUTROPHILS # BLD AUTO: 5.05 K/UL — SIGNIFICANT CHANGE UP (ref 1.8–7.4)
NEUTROPHILS NFR BLD AUTO: 71.9 % — SIGNIFICANT CHANGE UP (ref 43–77)
NRBC # BLD: 0 /100 WBCS — SIGNIFICANT CHANGE UP (ref 0–0)
ORGANISM # SPEC MICROSCOPIC CNT: SIGNIFICANT CHANGE UP
ORGANISM # SPEC MICROSCOPIC CNT: SIGNIFICANT CHANGE UP
PHOSPHATE SERPL-MCNC: 4.2 MG/DL — SIGNIFICANT CHANGE UP (ref 2.5–4.5)
PLATELET # BLD AUTO: 300 K/UL — SIGNIFICANT CHANGE UP (ref 150–400)
POTASSIUM SERPL-MCNC: 4 MMOL/L — SIGNIFICANT CHANGE UP (ref 3.5–5.3)
POTASSIUM SERPL-SCNC: 4 MMOL/L — SIGNIFICANT CHANGE UP (ref 3.5–5.3)
PROCALCITONIN SERPL-MCNC: 0.08 NG/ML — SIGNIFICANT CHANGE UP (ref 0.02–0.1)
PROT SERPL-MCNC: 7.1 G/DL — SIGNIFICANT CHANGE UP (ref 6–8.3)
RBC # BLD: 3.99 M/UL — SIGNIFICANT CHANGE UP (ref 3.8–5.2)
RBC # FLD: 13.9 % — SIGNIFICANT CHANGE UP (ref 10.3–14.5)
SODIUM SERPL-SCNC: 137 MMOL/L — SIGNIFICANT CHANGE UP (ref 135–145)
SPECIMEN SOURCE: SIGNIFICANT CHANGE UP
WBC # BLD: 7.03 K/UL — SIGNIFICANT CHANGE UP (ref 3.8–10.5)
WBC # FLD AUTO: 7.03 K/UL — SIGNIFICANT CHANGE UP (ref 3.8–10.5)

## 2022-05-06 PROCEDURE — 71045 X-RAY EXAM CHEST 1 VIEW: CPT

## 2022-05-06 PROCEDURE — 83735 ASSAY OF MAGNESIUM: CPT

## 2022-05-06 PROCEDURE — S2900: CPT

## 2022-05-06 PROCEDURE — 87086 URINE CULTURE/COLONY COUNT: CPT

## 2022-05-06 PROCEDURE — 84145 PROCALCITONIN (PCT): CPT

## 2022-05-06 PROCEDURE — 88305 TISSUE EXAM BY PATHOLOGIST: CPT

## 2022-05-06 PROCEDURE — 93970 EXTREMITY STUDY: CPT

## 2022-05-06 PROCEDURE — 84100 ASSAY OF PHOSPHORUS: CPT

## 2022-05-06 PROCEDURE — 87040 BLOOD CULTURE FOR BACTERIA: CPT

## 2022-05-06 PROCEDURE — 74177 CT ABD & PELVIS W/CONTRAST: CPT

## 2022-05-06 PROCEDURE — 36415 COLL VENOUS BLD VENIPUNCTURE: CPT

## 2022-05-06 PROCEDURE — 85027 COMPLETE CBC AUTOMATED: CPT

## 2022-05-06 PROCEDURE — 80048 BASIC METABOLIC PNL TOTAL CA: CPT

## 2022-05-06 PROCEDURE — 84484 ASSAY OF TROPONIN QUANT: CPT

## 2022-05-06 PROCEDURE — 85025 COMPLETE CBC W/AUTO DIFF WBC: CPT

## 2022-05-06 PROCEDURE — C1889: CPT

## 2022-05-06 PROCEDURE — 81001 URINALYSIS AUTO W/SCOPE: CPT

## 2022-05-06 PROCEDURE — 86850 RBC ANTIBODY SCREEN: CPT

## 2022-05-06 PROCEDURE — 87186 SC STD MICRODIL/AGAR DIL: CPT

## 2022-05-06 PROCEDURE — 82553 CREATINE MB FRACTION: CPT

## 2022-05-06 PROCEDURE — 93005 ELECTROCARDIOGRAM TRACING: CPT

## 2022-05-06 PROCEDURE — 99232 SBSQ HOSP IP/OBS MODERATE 35: CPT | Mod: GC

## 2022-05-06 PROCEDURE — U0003: CPT

## 2022-05-06 PROCEDURE — U0005: CPT

## 2022-05-06 PROCEDURE — 0225U NFCT DS DNA&RNA 21 SARSCOV2: CPT

## 2022-05-06 PROCEDURE — 80053 COMPREHEN METABOLIC PANEL: CPT

## 2022-05-06 PROCEDURE — 82550 ASSAY OF CK (CPK): CPT

## 2022-05-06 PROCEDURE — 86900 BLOOD TYPING SEROLOGIC ABO: CPT

## 2022-05-06 PROCEDURE — 93306 TTE W/DOPPLER COMPLETE: CPT

## 2022-05-06 PROCEDURE — 86901 BLOOD TYPING SEROLOGIC RH(D): CPT

## 2022-05-06 RX ORDER — ACETAMINOPHEN 500 MG
2 TABLET ORAL
Qty: 0 | Refills: 0 | DISCHARGE
Start: 2022-05-06

## 2022-05-06 RX ORDER — LORATADINE 10 MG/1
1 TABLET ORAL
Qty: 0 | Refills: 0 | DISCHARGE
Start: 2022-05-06

## 2022-05-06 RX ORDER — IBUPROFEN 200 MG
30 TABLET ORAL
Qty: 0 | Refills: 0 | DISCHARGE
Start: 2022-05-06

## 2022-05-06 RX ADMIN — LORATADINE 10 MILLIGRAM(S): 10 TABLET ORAL at 11:12

## 2022-05-06 RX ADMIN — Medication 650 MILLIGRAM(S): at 11:12

## 2022-05-06 RX ADMIN — ENOXAPARIN SODIUM 40 MILLIGRAM(S): 100 INJECTION SUBCUTANEOUS at 09:23

## 2022-05-06 NOTE — DISCHARGE NOTE PROVIDER - NSDCCPTREATMENT_GEN_ALL_CORE_FT
PRINCIPAL PROCEDURE  Procedure: Myomectomy, uterus, robot-assisted, laparoscopic, with cystoscopy  Findings and Treatment:       SECONDARY PROCEDURE  Procedure: Excision, endometriosis, laparoscopic  Findings and Treatment:

## 2022-05-06 NOTE — DISCHARGE NOTE PROVIDER - NSDCMRMEDTOKEN_GEN_ALL_CORE_FT
Orilissa 150 mg oral tablet: 1 tab(s) orally once a day   acetaminophen 325 mg oral tablet: 2 tab(s) orally every 6 hours, As needed, Mild Pain (1 - 3)  ibuprofen 100 mg/5 mL oral suspension: 30 milliliter(s) orally every 6 hours, As needed, Moderate Pain (4 - 6)  loratadine 10 mg oral tablet: 1 tab(s) orally once a day  Orilissa 150 mg oral tablet: 1 tab(s) orally once a day

## 2022-05-06 NOTE — DISCHARGE NOTE PROVIDER - NSDCHC_MEDRECSTATUS_GEN_ALL_CORE
Pre and Post op Patient Education/Teaching Flowsheet  Relevant Diagnosis:  Hernia  Teaching Topic:  Pre and post op teaching  Person(s) Involved in teaching:  Patient     Motivation Level:  Asks Questions:  Yes  Eager to Learn:  Yes  Cooperative:  Yes  Receptive (willing/able to accept information):  Yes  Any cultural factors/Worship beliefs that may influence understanding or compliance?  No    Patient/caregiver/family demonstrates understanding of the following:  Reason for the appointment, diagnosis, and treatment plan:  Yes  Patient demonstrates understanding of the following:  Pre-op bowel prep:  No  Post-op pain management recommendations (medications, ice compress, binder/athletic supporter (if applicable), etc.:  Yes  Inguinal hernia patients:  Post-op urinary retention- discussed signs/symptoms and visit to ER for High catheter placement and to stay in place for at least 48 hours:  NA  Restrictions:  Yes  Medications to take the day of surgery:  Per PCP  Blood thinner medications discussed and when to stop (if applicable):  Yes  Wound care:  Yes  Diabetes medication management (if applicable):  Per PCP  Which situations necessitate calling provider and whom to contact:  Discussed how to contact the hospital, nurse, and clinic scheduling staff if necessary      Date and time of surgery:  TBD  Location of surgery: [address] [place] [address]  History and Physical and any other testing necessary prior to surgery:  Yes  Required time line for completion of History and Physical and any pre-op testing:  Yes  Discuss need for someone to drive patient home and stay with them for 24 hours:  Yes  Pre-op showering/scrub information with Surgical Scrub:  Yes  NPO Guidelines:  NPO per Anesthesia Guidelines  COVID-19 Testing:  Yes    Infection Prevention: Patient demonstrates understanding of the following:  Patient instructed on hand hygiene:  Yes  Surgical procedure site care will be taught and will be reviewed at the  time of discharge  Signs and symptoms of infection taught:  Yes  Wound care reviewed and will be taught at the time of discharge  Central venous catheter care will be taught at the time of discharge (if applicable)    Post-op follow-up:  Instructional materials used/given/mailed:  [facility] Booklet, post op teaching sheet, Map, Soap, and arrival/location information    Surgical instructions mailed to patient           Admission Reconciliation is Not Complete  Discharge Reconciliation is Not Complete Admission Reconciliation is Completed  Discharge Reconciliation is Completed

## 2022-05-06 NOTE — DISCHARGE NOTE PROVIDER - HOSPITAL COURSE
35 yo POD7 s/p RA endometriosis excision, myomectomy (1x2cm), cystoscopy, D&C, 2 small full thickness rectal defects repaired by general surgery.  Pt was admitted post operatively for concern for chest pain.  Cardiology was consulted and ruled patient out for any malignant causes of chest pain and signed off.  General SUrgery managed pt diet and pt was slowly moved up to LRD which she is now tolerating. 35 yo POD7 s/p RA endometriosis excision, myomectomy (1x2cm), cystoscopy, D&C, 2 small full thickness rectal defects repaired by general surgery.  Pt was admitted post operatively for concern for chest pain.  Cardiology was consulted and ruled patient out for any malignant causes of chest pain and signed off.  General SUrgery managed pt diet and pt was slowly moved up to LRD which she is now tolerating. On POD4 pt started to have fevers, pt was started on IV abx preemptively, and pt was worked up completely for post operative fevers and all causes were ruled out. Ct scan done was negative showing no abscess, blood cultures showed no growth to date, RVP and covid were negative.  Infectious Disease was consulted for her post operative fevers and their work up was negative as well, and suggested no antibiotics upon discharge. Pt has now been afebrile for over 24 hours and is cleared for discharge per Dr Arroyo.  Pt is passing all post operative milestones- having bowel movements, tolerating regular diet, ambulating, voiding.  Pt is hemodynamically stable for discharge.  Pt to f/u with Dr Arroyo outpatient.

## 2022-05-06 NOTE — DISCHARGE NOTE NURSING/CASE MANAGEMENT/SOCIAL WORK - PATIENT PORTAL LINK FT
You can access the FollowMyHealth Patient Portal offered by [facility] by registering at the following website: [email or web address]. By joining [employer]’s FollowMyHealth portal, you will also be able to view your health information using other applications (apps) compatible with our system.

## 2022-05-06 NOTE — DISCHARGE NOTE PROVIDER - NSDCFUADDINST_GEN_ALL_CORE_FT
Pelvic rest (nothing in vagina) x6 weeks or unless otherwise instructed by physician. Schedule follow up appointment with Dr. Arroyo      in 1-2 weeks. Go to nearest ED if fever >100.4 F, severe abdominal pain or heavy vaginal bleeding.   Wound care: Showering is allowed, no baths. Do not scrub incision area. Pat and dry all areas where incisions are.   Take Motrin 600mg every 6 hours or Tylenol 1000mg every 8 hours as needed for pain  Pelvic rest (nothing in vagina) x6 weeks or unless otherwise instructed by physician. Schedule follow up appointment with Dr. Arroyo  in 1-2 weeks. Go to nearest ED if fever >100.4 F, severe abdominal pain or heavy vaginal bleeding.   Wound care: Showering is allowed, no baths. Do not scrub incision area. Pat and dry all areas where incisions are.   Take Motrin 600mg every 6 hours or Tylenol 1000mg every 8 hours as needed for pain

## 2022-05-06 NOTE — PROGRESS NOTE ADULT - ASSESSMENT
37 y/o Female who presented for scheduled endometriosis excision and now s/p laparoscopic enterolysis and myomectomy 4/29, found to have intermittent fevers despite negative blood cultures, imaging, and LE dopplers. We recommended RVP and procalcitonin which were both negative. Impression at this time is likely post-operative fevers but likely not from infection standpoint.     Recommendations:  - please do not treat E. faecalis UTI given patient without dysuria/UTI symptoms  - Patient without > 3 loose stools in 24 hours, so okay to not check c. diff at this time  - Okay for discharge from ID perspective.     D/W primary team. ID team 1 to sign-off at this time. Please reconsult with questions.     Note not final until attending attestation.

## 2022-05-06 NOTE — PROGRESS NOTE ADULT - REASON FOR ADMISSION
Scheduled endometriosis excision

## 2022-05-06 NOTE — PROGRESS NOTE ADULT - ASSESSMENT
36 y.o. G0 POD7  s/p r/a endometriosis excision, myomectomy, cysto, D+C, 2 small full thickness rectal defects repaired by general surgery    Neuro: tylenol/toradol, oxy prn  CV: heplocked; cardiology following for episode of chest pain POD1, now resolved. Negative troponins, and low suspicion for ischemic cardiac etiology not requiring echo per cardiology.   Pulm: RA  ID: post op fever started 5/3 at 15:31, TMax 102.3 on 5/3 at 18:24, T last 101.4 on 5/4 at 1830 - BCx without growth to day, UCx without significant growth. General surgery recommended CXR as well as CT A/P with contrast - negative for rectal leak or abscess. Pt now s/p empiric zosyn  5/3-5/4; Covid negative (5/4), LE dopplers prelim negative (5/4). ID following, appreciate recs  FEN/GI: LRD, f/u general surgery recommendations. ,Zofran prn, simethicone atc  : voiding   VTE prophylaxis: SCDs, lovenox

## 2022-05-06 NOTE — PROGRESS NOTE ADULT - SUBJECTIVE AND OBJECTIVE BOX
GYN Progress Note    Patient seen at bedside.  Pain controlled overnight.  Tolerating low res diet.  OOB ambulating without difficulty.  Voiding without issues.  +flatus. 1x very small BM, not watery.    Denies CP, palpitations, SOB, fever, chills, nausea, vomiting.    Vital Signs Last 24 Hrs  T(C): 36.8 (06 May 2022 04:55), Max: 37.7 (05 May 2022 14:36)  T(F): 98.3 (06 May 2022 04:55), Max: 99.8 (05 May 2022 14:36)  HR: 77 (06 May 2022 04:55) (77 - 96)  BP: 106/67 (06 May 2022 04:55) (98/62 - 108/71)  BP(mean): --  RR: 18 (06 May 2022 04:55) (17 - 18)  SpO2: 98% (06 May 2022 04:55) (98% - 100%)    Physical Exam:  Gen: No Acute Distress  GI: soft, appropriately tender, mildly distended, +BS, no rebound, no guarding.  Incision C/D/I  Ext: SCDs in place, [facility]    I&O's Summary    05 May 2022 07:01  -  06 May 2022 07:00  --------------------------------------------------------  IN: 830 mL / OUT: 2300 mL / NET: -1470 mL      MEDICATIONS  (STANDING):  enoxaparin Injectable 40 milliGRAM(s) SubCutaneous every 24 hours  iohexol 300 mG (iodine)/mL Oral Solution 30 milliLiter(s) Oral once  loratadine 10 milliGRAM(s) Oral daily  melatonin 1 milliGRAM(s) Oral at bedtime  simethicone 80 milliGRAM(s) Chew every 6 hours    MEDICATIONS  (PRN):  acetaminophen     Tablet .. 650 milliGRAM(s) Oral every 6 hours PRN Mild Pain (1 - 3)  ibuprofen  Suspension. 600 milliGRAM(s) Oral every 6 hours PRN Moderate Pain (4 - 6)  ondansetron Injectable 8 milliGRAM(s) IV Push every 8 hours PRN Nausea and/or Vomiting  oxyCODONE    IR 10 milliGRAM(s) Oral every 4 hours PRN Severe Pain (7 - 10)      LABS:                        9.0    7.03  )-----------( 300      ( 06 May 2022 06:00 )             28.3     05-06    137  |  102  |  6<L>  ----------------------------<  88  4.0   |  26  |  0.70    Ca    9.3      06 May 2022 06:00  Phos  4.2     05-06  Mg     2.0     05-06    TPro  7.1  /  Alb  3.6  /  TBili  0.3  /  DBili  x   /  AST  26  /  ALT  49<H>  /  AlkPhos  90  05-06      Urinalysis Basic - ( 04 May 2022 12:01 )    Color: Yellow / Appearance: Clear / S.025 / pH: x  Gluc: x / Ketone: Trace mg/dL  / Bili: Negative / Urobili: 1.0 E.U./dL   Blood: x / Protein: Trace mg/dL / Nitrite: NEGATIVE   Leuk Esterase: NEGATIVE / RBC: Many /HPF / WBC 5-10 /HPF   Sq Epi: x / Non Sq Epi: 0-5 /HPF / Bacteria: Present /HPF

## 2022-05-06 NOTE — DISCHARGE NOTE PROVIDER - CARE PROVIDER_API CALL
Shikha Arroyo)  Obstetrics and Gynecology  [address] [place] [address], [address]  [place] [address]  Phone: [phone]  Fax: [phone]  Follow Up Time: 2 weeks

## 2022-05-06 NOTE — PROGRESS NOTE ADULT - ATTENDING COMMENTS
Agree with above.  Fevers have resolved > 24 hrs. WBC and procalcitonin normal.  RVP negative.  Diarrhea resolved.  Urine culture is likely asymptomatic bacteruria given lack of symptoms or could be a contaminant given other urine culture is negative.  Fevers likely post-operative.  Ok to discharge from my standpoint.
Initial attending contact date  5/2/22    . See fellow note written above for details. I reviewed the fellow documentation. I have personally seen and examined this patient. I reviewed vitals, labs, medications, cardiac studies, and additional imaging. I agree with the above fellow's findings and plans as written above with the following additions/statements.    36 y.o. female who presented for scheduled endometriosis excision. Patient is s/p laparoscopic enterolysis and myomectomy 4/29. Cardiology consulted for atypical chest pain w/ associated SOB that lasted several minutes.     - Atypical CP  - EKG: low voltage, NSR with non- specific EKG changes   -trop negative  - She has no risk factors and denies any fam hx of CAD. Atypical chest pain. Low suspicion for ischemia   - patient does not need TTE  Please re-consult as needed

## 2022-05-06 NOTE — PROGRESS NOTE ADULT - SUBJECTIVE AND OBJECTIVE BOX
INFECTIOUS DISEASES CONSULT FOLLOW-UP NOTE    INTERVAL HPI/OVERNIGHT EVENTS: RVP and procal negative. Patient remained afebrile overnight. Reports to having one loose stool this AM but otherwise 2 solid bowel movements overnight. Reports no fevers, chills, cough, and abdominal pain.       ANTIBIOTICS/RELEVANT:    MEDICATIONS  (STANDING):  enoxaparin Injectable 40 milliGRAM(s) SubCutaneous every 24 hours  iohexol 300 mG (iodine)/mL Oral Solution 30 milliLiter(s) Oral once  loratadine 10 milliGRAM(s) Oral daily  melatonin 1 milliGRAM(s) Oral at bedtime  simethicone 80 milliGRAM(s) Chew every 6 hours    MEDICATIONS  (PRN):  acetaminophen     Tablet .. 650 milliGRAM(s) Oral every 6 hours PRN Mild Pain (1 - 3)  ibuprofen  Suspension. 600 milliGRAM(s) Oral every 6 hours PRN Moderate Pain (4 - 6)  ondansetron Injectable 8 milliGRAM(s) IV Push every 8 hours PRN Nausea and/or Vomiting  oxyCODONE    IR 10 milliGRAM(s) Oral every 4 hours PRN Severe Pain (7 - 10)        Vital Signs Last 24 Hrs  T(C): 36.7 (06 May 2022 08:18), Max: 37.7 (05 May 2022 14:36)  T(F): 98.1 (06 May 2022 08:18), Max: 99.8 (05 May 2022 14:36)  HR: 79 (06 May 2022 08:18) (77 - 96)  BP: 105/71 (06 May 2022 08:18) (98/62 - 108/71)  BP(mean): --  RR: 17 (06 May 2022 08:18) (17 - 18)  SpO2: 98% (06 May 2022 08:18) (98% - 100%)    05-05-22 @ 07:01  -  05-06-22 @ 07:00  --------------------------------------------------------  IN: 830 mL / OUT: 2300 mL / NET: -1470 mL    05-06-22 @ 07:01  -  05-06-22 @ 13:56  --------------------------------------------------------  IN: 240 mL / OUT: 200 mL / NET: 40 mL        PHYSICAL EXAM  Constitutional: alert, NAD  Eyes: the sclera and conjunctiva were normal.   ENT: the ears and nose were normal in appearance.   Neck: the appearance of the neck was normal and the neck was supple.   Pulmonary: no respiratory distress and lungs CTA bilaterally.   Heart: heart rate was normal and rhythm regular, normal S1 and S2  Vascular: no peripheral edema  Abdomen: normal bowel sounds, soft, non-tender  Neurological: no focal deficits  Psychiatric: the affect was normal      LABS:                        9.0    7.03  )-----------( 300      ( 06 May 2022 06:00 )             28.3     05-06    137  |  102  |  6<L>  ----------------------------<  88  4.0   |  26  |  0.70    Ca    9.3      06 May 2022 06:00  Phos  4.2     05-06  Mg     2.0     05-06    TPro  7.1  /  Alb  3.6  /  TBili  0.3  /  DBili  x   /  AST  26  /  ALT  49<H>  /  AlkPhos  90  05-06          MICROBIOLOGY:    Culture - Urine (collected 04 May 2022 08:43)  Source: Clean Catch Clean Catch (Midstream)  Final Report (05 May 2022 09:12):    Less than 10,000 cols/cc; Insignificant amount of growth.    Culture - Urine (collected 04 May 2022 08:43)  Source: Clean Catch Clean Catch (Midstream)  Final Report (06 May 2022 12:02):    80,000 CFU/ml Enterococcus faecalis    15,000 CFU/ml Corynebacterium amycolatum  Organism: Enterococcus faecalis (06 May 2022 12:02)  Organism: Enterococcus faecalis (06 May 2022 12:02)      -  Ampicillin: S <=2 Predicts results to ampicillin/sulbactam, amoxacillin-clavulanate and  piperacillin-tazobactam.      -  Ciprofloxacin: S <=1      -  Nitrofurantoin: S <=32 Should not be used to treat pyelonephritis.      -  Tetra/Doxy: R >8      -  Vancomycin: S 4      Method Type: OMAIRA    Culture - Blood (collected 03 May 2022 18:36)  Source: .Blood Blood  Preliminary Report (05 May 2022 19:01):    No growth at 2 days.    Culture - Blood (collected 03 May 2022 18:36)  Source: .Blood Blood  Preliminary Report (05 May 2022 19:01):    No growth at 2 days.        RADIOLOGY & ADDITIONAL STUDIES:  Reviewed

## 2022-05-06 NOTE — DISCHARGE NOTE NURSING/CASE MANAGEMENT/SOCIAL WORK - NSDCPEFALRISK_GEN_ALL_CORE
For information on Fall & Injury Prevention, visit: [email or web address] OR  [email or web address] OR  https://www.cdc.gov/steadi/patient.html

## 2022-05-06 NOTE — PROGRESS NOTE ADULT - PROVIDER SPECIALTY LIST ADULT
Cardiology
Colorectal Surgery
Colorectal Surgery
GYN
Surgery
Colorectal Surgery
Colorectal Surgery
GYN
GYN
Colorectal Surgery
GYN
GYN
Infectious Disease
OB
Surgery
Surgery
GYN

## 2022-05-08 LAB
CULTURE RESULTS: SIGNIFICANT CHANGE UP
CULTURE RESULTS: SIGNIFICANT CHANGE UP
SPECIMEN SOURCE: SIGNIFICANT CHANGE UP
SPECIMEN SOURCE: SIGNIFICANT CHANGE UP

## 2022-05-17 LAB — SURGICAL PATHOLOGY STUDY: SIGNIFICANT CHANGE UP

## 2023-08-23 NOTE — H&P ADULT - BIRTH SEX
Patient calling to ask if we needed labs drawn tomorrow as he was going for his INR.   Writer noticing last creat being elevated 1.20 - 1.40.    Patient reports his weight is up farther 175-178 lb's  Has puffy feet.   Has been taking 60 mg Torsemide daily for the past two weeks.  States when I was taking 80 mg daily, my feet/swelling went away.     Patient admits to eating better, appetite better, is on Prednisone.     BMP and BNP added to tomorrows lab draw.   Once resulted further instruction will be provided to the patient.  Patient agreed.   Reports Hodgkin's is in remission.    Female

## 2023-12-29 ENCOUNTER — EMERGENCY (EMERGENCY)
Facility: HOSPITAL | Age: 38
LOS: 1 days | Discharge: HOME | End: 2023-12-29
Attending: STUDENT IN AN ORGANIZED HEALTH CARE EDUCATION/TRAINING PROGRAM | Admitting: STUDENT IN AN ORGANIZED HEALTH CARE EDUCATION/TRAINING PROGRAM
Payer: COMMERCIAL

## 2023-12-29 VITALS
OXYGEN SATURATION: 100 % | HEART RATE: 68 BPM | TEMPERATURE: 98 F | DIASTOLIC BLOOD PRESSURE: 82 MMHG | RESPIRATION RATE: 16 BRPM | SYSTOLIC BLOOD PRESSURE: 127 MMHG

## 2023-12-29 VITALS
TEMPERATURE: 98 F | OXYGEN SATURATION: 97 % | RESPIRATION RATE: 17 BRPM | SYSTOLIC BLOOD PRESSURE: 128 MMHG | DIASTOLIC BLOOD PRESSURE: 82 MMHG | HEART RATE: 77 BPM

## 2023-12-29 DIAGNOSIS — Z98.890 OTHER SPECIFIED POSTPROCEDURAL STATES: Chronic | ICD-10-CM

## 2023-12-29 DIAGNOSIS — Z90.721 ACQUIRED ABSENCE OF OVARIES, UNILATERAL: Chronic | ICD-10-CM

## 2023-12-29 PROBLEM — N80.9 ENDOMETRIOSIS, UNSPECIFIED: Chronic | Status: ACTIVE | Noted: 2022-04-29

## 2023-12-29 LAB
ALBUMIN SERPL ELPH-MCNC: 4.1 G/DL — SIGNIFICANT CHANGE UP (ref 3.3–5)
ALBUMIN SERPL ELPH-MCNC: 4.1 G/DL — SIGNIFICANT CHANGE UP (ref 3.3–5)
ALP SERPL-CCNC: 73 U/L — SIGNIFICANT CHANGE UP (ref 40–120)
ALP SERPL-CCNC: 73 U/L — SIGNIFICANT CHANGE UP (ref 40–120)
ALT FLD-CCNC: 12 U/L — SIGNIFICANT CHANGE UP (ref 4–33)
ALT FLD-CCNC: 12 U/L — SIGNIFICANT CHANGE UP (ref 4–33)
ANION GAP SERPL CALC-SCNC: 6 MMOL/L — LOW (ref 7–14)
ANION GAP SERPL CALC-SCNC: 6 MMOL/L — LOW (ref 7–14)
APPEARANCE UR: CLEAR — SIGNIFICANT CHANGE UP
APPEARANCE UR: CLEAR — SIGNIFICANT CHANGE UP
AST SERPL-CCNC: 21 U/L — SIGNIFICANT CHANGE UP (ref 4–32)
AST SERPL-CCNC: 21 U/L — SIGNIFICANT CHANGE UP (ref 4–32)
B PERT DNA SPEC QL NAA+PROBE: SIGNIFICANT CHANGE UP
B PERT DNA SPEC QL NAA+PROBE: SIGNIFICANT CHANGE UP
B PERT+PARAPERT DNA PNL SPEC NAA+PROBE: SIGNIFICANT CHANGE UP
B PERT+PARAPERT DNA PNL SPEC NAA+PROBE: SIGNIFICANT CHANGE UP
BACTERIA # UR AUTO: NEGATIVE /HPF — SIGNIFICANT CHANGE UP
BACTERIA # UR AUTO: NEGATIVE /HPF — SIGNIFICANT CHANGE UP
BILIRUB SERPL-MCNC: 0.6 MG/DL — SIGNIFICANT CHANGE UP (ref 0.2–1.2)
BILIRUB SERPL-MCNC: 0.6 MG/DL — SIGNIFICANT CHANGE UP (ref 0.2–1.2)
BILIRUB UR-MCNC: NEGATIVE — SIGNIFICANT CHANGE UP
BILIRUB UR-MCNC: NEGATIVE — SIGNIFICANT CHANGE UP
BORDETELLA PARAPERTUSSIS (RAPRVP): SIGNIFICANT CHANGE UP
BORDETELLA PARAPERTUSSIS (RAPRVP): SIGNIFICANT CHANGE UP
BUN SERPL-MCNC: 9 MG/DL — SIGNIFICANT CHANGE UP (ref 7–23)
BUN SERPL-MCNC: 9 MG/DL — SIGNIFICANT CHANGE UP (ref 7–23)
C PNEUM DNA SPEC QL NAA+PROBE: SIGNIFICANT CHANGE UP
C PNEUM DNA SPEC QL NAA+PROBE: SIGNIFICANT CHANGE UP
CALCIUM SERPL-MCNC: 9.6 MG/DL — SIGNIFICANT CHANGE UP (ref 8.4–10.5)
CALCIUM SERPL-MCNC: 9.6 MG/DL — SIGNIFICANT CHANGE UP (ref 8.4–10.5)
CAST: 0 /LPF — SIGNIFICANT CHANGE UP (ref 0–4)
CAST: 0 /LPF — SIGNIFICANT CHANGE UP (ref 0–4)
CHLORIDE SERPL-SCNC: 105 MMOL/L — SIGNIFICANT CHANGE UP (ref 98–107)
CHLORIDE SERPL-SCNC: 105 MMOL/L — SIGNIFICANT CHANGE UP (ref 98–107)
CO2 SERPL-SCNC: 29 MMOL/L — SIGNIFICANT CHANGE UP (ref 22–31)
CO2 SERPL-SCNC: 29 MMOL/L — SIGNIFICANT CHANGE UP (ref 22–31)
COLOR SPEC: YELLOW — SIGNIFICANT CHANGE UP
COLOR SPEC: YELLOW — SIGNIFICANT CHANGE UP
CREAT SERPL-MCNC: 0.68 MG/DL — SIGNIFICANT CHANGE UP (ref 0.5–1.3)
CREAT SERPL-MCNC: 0.68 MG/DL — SIGNIFICANT CHANGE UP (ref 0.5–1.3)
DIFF PNL FLD: NEGATIVE — SIGNIFICANT CHANGE UP
DIFF PNL FLD: NEGATIVE — SIGNIFICANT CHANGE UP
EGFR: 114 ML/MIN/1.73M2 — SIGNIFICANT CHANGE UP
EGFR: 114 ML/MIN/1.73M2 — SIGNIFICANT CHANGE UP
FLUAV SUBTYP SPEC NAA+PROBE: SIGNIFICANT CHANGE UP
FLUAV SUBTYP SPEC NAA+PROBE: SIGNIFICANT CHANGE UP
FLUBV RNA SPEC QL NAA+PROBE: SIGNIFICANT CHANGE UP
FLUBV RNA SPEC QL NAA+PROBE: SIGNIFICANT CHANGE UP
GLUCOSE SERPL-MCNC: 83 MG/DL — SIGNIFICANT CHANGE UP (ref 70–99)
GLUCOSE SERPL-MCNC: 83 MG/DL — SIGNIFICANT CHANGE UP (ref 70–99)
GLUCOSE UR QL: NEGATIVE MG/DL — SIGNIFICANT CHANGE UP
GLUCOSE UR QL: NEGATIVE MG/DL — SIGNIFICANT CHANGE UP
HADV DNA SPEC QL NAA+PROBE: SIGNIFICANT CHANGE UP
HADV DNA SPEC QL NAA+PROBE: SIGNIFICANT CHANGE UP
HCOV 229E RNA SPEC QL NAA+PROBE: SIGNIFICANT CHANGE UP
HCOV 229E RNA SPEC QL NAA+PROBE: SIGNIFICANT CHANGE UP
HCOV HKU1 RNA SPEC QL NAA+PROBE: SIGNIFICANT CHANGE UP
HCOV HKU1 RNA SPEC QL NAA+PROBE: SIGNIFICANT CHANGE UP
HCOV NL63 RNA SPEC QL NAA+PROBE: SIGNIFICANT CHANGE UP
HCOV NL63 RNA SPEC QL NAA+PROBE: SIGNIFICANT CHANGE UP
HCOV OC43 RNA SPEC QL NAA+PROBE: SIGNIFICANT CHANGE UP
HCOV OC43 RNA SPEC QL NAA+PROBE: SIGNIFICANT CHANGE UP
HCT VFR BLD CALC: 34.2 % — LOW (ref 34.5–45)
HCT VFR BLD CALC: 34.2 % — LOW (ref 34.5–45)
HGB BLD-MCNC: 10.8 G/DL — LOW (ref 11.5–15.5)
HGB BLD-MCNC: 10.8 G/DL — LOW (ref 11.5–15.5)
HMPV RNA SPEC QL NAA+PROBE: SIGNIFICANT CHANGE UP
HMPV RNA SPEC QL NAA+PROBE: SIGNIFICANT CHANGE UP
HPIV1 RNA SPEC QL NAA+PROBE: SIGNIFICANT CHANGE UP
HPIV1 RNA SPEC QL NAA+PROBE: SIGNIFICANT CHANGE UP
HPIV2 RNA SPEC QL NAA+PROBE: SIGNIFICANT CHANGE UP
HPIV2 RNA SPEC QL NAA+PROBE: SIGNIFICANT CHANGE UP
HPIV3 RNA SPEC QL NAA+PROBE: SIGNIFICANT CHANGE UP
HPIV3 RNA SPEC QL NAA+PROBE: SIGNIFICANT CHANGE UP
HPIV4 RNA SPEC QL NAA+PROBE: SIGNIFICANT CHANGE UP
HPIV4 RNA SPEC QL NAA+PROBE: SIGNIFICANT CHANGE UP
KETONES UR-MCNC: NEGATIVE MG/DL — SIGNIFICANT CHANGE UP
KETONES UR-MCNC: NEGATIVE MG/DL — SIGNIFICANT CHANGE UP
LEUKOCYTE ESTERASE UR-ACNC: ABNORMAL
LEUKOCYTE ESTERASE UR-ACNC: ABNORMAL
LIDOCAIN IGE QN: 18 U/L — SIGNIFICANT CHANGE UP (ref 7–60)
LIDOCAIN IGE QN: 18 U/L — SIGNIFICANT CHANGE UP (ref 7–60)
M PNEUMO DNA SPEC QL NAA+PROBE: SIGNIFICANT CHANGE UP
M PNEUMO DNA SPEC QL NAA+PROBE: SIGNIFICANT CHANGE UP
MCHC RBC-ENTMCNC: 22.9 PG — LOW (ref 27–34)
MCHC RBC-ENTMCNC: 22.9 PG — LOW (ref 27–34)
MCHC RBC-ENTMCNC: 31.6 GM/DL — LOW (ref 32–36)
MCHC RBC-ENTMCNC: 31.6 GM/DL — LOW (ref 32–36)
MCV RBC AUTO: 72.5 FL — LOW (ref 80–100)
MCV RBC AUTO: 72.5 FL — LOW (ref 80–100)
NITRITE UR-MCNC: NEGATIVE — SIGNIFICANT CHANGE UP
NITRITE UR-MCNC: NEGATIVE — SIGNIFICANT CHANGE UP
NRBC # BLD: 0 /100 WBCS — SIGNIFICANT CHANGE UP (ref 0–0)
NRBC # BLD: 0 /100 WBCS — SIGNIFICANT CHANGE UP (ref 0–0)
NRBC # FLD: 0 K/UL — SIGNIFICANT CHANGE UP (ref 0–0)
NRBC # FLD: 0 K/UL — SIGNIFICANT CHANGE UP (ref 0–0)
PH UR: 6.5 — SIGNIFICANT CHANGE UP (ref 5–8)
PH UR: 6.5 — SIGNIFICANT CHANGE UP (ref 5–8)
PLATELET # BLD AUTO: 248 K/UL — SIGNIFICANT CHANGE UP (ref 150–400)
PLATELET # BLD AUTO: 248 K/UL — SIGNIFICANT CHANGE UP (ref 150–400)
POTASSIUM SERPL-MCNC: 4 MMOL/L — SIGNIFICANT CHANGE UP (ref 3.5–5.3)
POTASSIUM SERPL-MCNC: 4 MMOL/L — SIGNIFICANT CHANGE UP (ref 3.5–5.3)
POTASSIUM SERPL-SCNC: 4 MMOL/L — SIGNIFICANT CHANGE UP (ref 3.5–5.3)
POTASSIUM SERPL-SCNC: 4 MMOL/L — SIGNIFICANT CHANGE UP (ref 3.5–5.3)
PROT SERPL-MCNC: 7.5 G/DL — SIGNIFICANT CHANGE UP (ref 6–8.3)
PROT SERPL-MCNC: 7.5 G/DL — SIGNIFICANT CHANGE UP (ref 6–8.3)
PROT UR-MCNC: NEGATIVE MG/DL — SIGNIFICANT CHANGE UP
PROT UR-MCNC: NEGATIVE MG/DL — SIGNIFICANT CHANGE UP
RAPID RVP RESULT: SIGNIFICANT CHANGE UP
RAPID RVP RESULT: SIGNIFICANT CHANGE UP
RBC # BLD: 4.72 M/UL — SIGNIFICANT CHANGE UP (ref 3.8–5.2)
RBC # BLD: 4.72 M/UL — SIGNIFICANT CHANGE UP (ref 3.8–5.2)
RBC # FLD: 14.5 % — SIGNIFICANT CHANGE UP (ref 10.3–14.5)
RBC # FLD: 14.5 % — SIGNIFICANT CHANGE UP (ref 10.3–14.5)
RBC CASTS # UR COMP ASSIST: 1 /HPF — SIGNIFICANT CHANGE UP (ref 0–4)
RBC CASTS # UR COMP ASSIST: 1 /HPF — SIGNIFICANT CHANGE UP (ref 0–4)
RSV RNA SPEC QL NAA+PROBE: SIGNIFICANT CHANGE UP
RSV RNA SPEC QL NAA+PROBE: SIGNIFICANT CHANGE UP
RV+EV RNA SPEC QL NAA+PROBE: SIGNIFICANT CHANGE UP
RV+EV RNA SPEC QL NAA+PROBE: SIGNIFICANT CHANGE UP
SARS-COV-2 RNA SPEC QL NAA+PROBE: SIGNIFICANT CHANGE UP
SARS-COV-2 RNA SPEC QL NAA+PROBE: SIGNIFICANT CHANGE UP
SODIUM SERPL-SCNC: 140 MMOL/L — SIGNIFICANT CHANGE UP (ref 135–145)
SODIUM SERPL-SCNC: 140 MMOL/L — SIGNIFICANT CHANGE UP (ref 135–145)
SP GR SPEC: 1.02 — SIGNIFICANT CHANGE UP (ref 1–1.03)
SP GR SPEC: 1.02 — SIGNIFICANT CHANGE UP (ref 1–1.03)
SQUAMOUS # UR AUTO: 0 /HPF — SIGNIFICANT CHANGE UP (ref 0–5)
SQUAMOUS # UR AUTO: 0 /HPF — SIGNIFICANT CHANGE UP (ref 0–5)
UROBILINOGEN FLD QL: 0.2 MG/DL — SIGNIFICANT CHANGE UP (ref 0.2–1)
UROBILINOGEN FLD QL: 0.2 MG/DL — SIGNIFICANT CHANGE UP (ref 0.2–1)
WBC # BLD: 5.11 K/UL — SIGNIFICANT CHANGE UP (ref 3.8–10.5)
WBC # BLD: 5.11 K/UL — SIGNIFICANT CHANGE UP (ref 3.8–10.5)
WBC # FLD AUTO: 5.11 K/UL — SIGNIFICANT CHANGE UP (ref 3.8–10.5)
WBC # FLD AUTO: 5.11 K/UL — SIGNIFICANT CHANGE UP (ref 3.8–10.5)
WBC UR QL: 2 /HPF — SIGNIFICANT CHANGE UP (ref 0–5)
WBC UR QL: 2 /HPF — SIGNIFICANT CHANGE UP (ref 0–5)

## 2023-12-29 PROCEDURE — 99284 EMERGENCY DEPT VISIT MOD MDM: CPT

## 2023-12-29 PROCEDURE — 71046 X-RAY EXAM CHEST 2 VIEWS: CPT | Mod: 26

## 2023-12-29 RX ORDER — KETOROLAC TROMETHAMINE 30 MG/ML
15 SYRINGE (ML) INJECTION ONCE
Refills: 0 | Status: DISCONTINUED | OUTPATIENT
Start: 2023-12-29 | End: 2023-12-29

## 2023-12-29 RX ADMIN — Medication 15 MILLIGRAM(S): at 10:10

## 2023-12-29 NOTE — ED ADULT NURSE NOTE - NSFALLUNIVINTERV_ED_ALL_ED
Bed/Stretcher in lowest position, wheels locked, appropriate side rails in place/Call bell, personal items and telephone in reach/Instruct patient to call for assistance before getting out of bed/chair/stretcher/Non-slip footwear applied when patient is off stretcher/[place] to call system/Physically safe environment - no spills, clutter or unnecessary equipment/Purposeful proactive rounding/Room/bathroom lighting operational, light cord in reach Bed/Stretcher in lowest position, wheels locked, appropriate side rails in place/Call bell, personal items and telephone in reach/Instruct patient to call for assistance before getting out of bed/chair/stretcher/Non-slip footwear applied when patient is off stretcher/[place] to call system/Physically safe environment - no spills, clutter or unnecessary equipment/Purposeful proactive rounding/Room/bathroom lighting operational, light cord in reach

## 2023-12-29 NOTE — ED PROVIDER NOTE - CLINICAL SUMMARY MEDICAL DECISION MAKING FREE TEXT BOX
39 yo F with h/o endometriosis who presents to the ED c/o 5 days of abdominal pain and fatigue. She also reports a "weird" sensation in her chest when she eats but she has been tolerating PO. No fever, chills, SOB, palpitations, cough, runny nose, n/v/d, dysuria. On exam, pt's abdomen is soft and non-tender with no rebound or guarding. Possibly viral syndrome, doubt acute intraabdominal pathology with soft and non-tender abdomen, tolerating PO. Plan for labs, CXR, toradol for pain, re-assess no

## 2023-12-29 NOTE — ED PROVIDER NOTE - NSFOLLOWUPINSTRUCTIONS_ED_ALL_ED_FT
You were seen in the emergency department for abdominal pain and fatigue. You had blood work, chest x-ray, and urine done all of which were normal. Please follow-up with your primary care provider. Return to the emergency department for any new or worsening symptoms.

## 2023-12-29 NOTE — ED PROVIDER NOTE - PATIENT PORTAL LINK FT
You can access the FollowMyHealth Patient Portal offered by [facility] by registering at the following website: [email or web address]. By joining [employer]’s FollowMyHealth portal, you will also be able to view your health information using other applications (apps) compatible with our system. You can access the FollowMyHealth Patient Portal offered by [facility] by registering at the following website: [email or web address]. By joining [employer]’s FollowMyHealth portal, you will also be able to view your health information using other applications (apps) compatible with our system.

## 2023-12-29 NOTE — ED ADULT NURSE NOTE - SUICIDE SCREENING DEPRESSION
Patient: Jossue Hickey    Procedure: Procedure(s):  RIGHT EYE PHACOEMULSIFICATION, CATARACT, WITH INTRAOCULAR LENS IMPLANT       Anesthesia Type:  No value filed.    Note:  Disposition: Outpatient   Postop Pain Control: Uneventful            Sign Out: Well controlled pain   PONV: No   Neuro/Psych: Uneventful            Sign Out: Acceptable/Baseline neuro status   Airway/Respiratory: Uneventful            Sign Out: Acceptable/Baseline resp. status   CV/Hemodynamics: Uneventful            Sign Out: Acceptable CV status; No obvious hypovolemia; No obvious fluid overload   Other NRE: NONE   DID A NON-ROUTINE EVENT OCCUR? No       Last vitals:  Vitals Value Taken Time   /73 10/26/23 1231   Temp 36.1  C (97  F) 10/26/23 1231   Pulse 79 10/26/23 1231   Resp 16 10/26/23 1231   SpO2 98 % 10/26/23 1231       Electronically Signed By: Epi Shoemaker MD  October 26, 2023  12:55 PM  
Negative

## 2023-12-29 NOTE — ED PROVIDER NOTE - OBJECTIVE STATEMENT
37 yo F with h/o endometriosis who presents to the ED c/o 5 days of abdominal pain and fatigue. She also reports a "weird" sensation in her chest when she eats but she has been tolerating PO. No fever, chills, SOB, palpitations, cough, runny nose, n/v/d, dysuria.

## 2023-12-29 NOTE — ED ADULT NURSE NOTE - OBJECTIVE STATEMENT
Patient is a 39 yo female, phx endometriosis, presenting with lower abdominal/groin pain x 5 days, also endorsing chest discomfort. AAOx4, no signs of distress, abdomen soft, nontender, nondistended. Denies SOB, cough, fever, nausea, vomiting, diarrhea, urinary symptoms, vaginal bleeding. 20G PIV placed to RAC, labs sent per orders. Medicated for pain. CXR completed. Fall precautions maintained.

## 2024-01-27 NOTE — ED ADULT NURSE NOTE - NSSEPSISSUSPECTED_ED_A_ED
GI Treatment Plan    Lily Green is a 72 y.o. female admitted to hospital 1/2/2024 (Hospital Day: 26) due to Malignant carcinoid tumor of bronchus.     Interval History  S/p EGD on 1/26 with nonbleeding gastric ulcer. Her H&H is stable with no further episodes of bleeding.     Objective  Temp:  [97.4 °F (36.3 °C)-98.6 °F (37 °C)] 98.6 °F (37 °C) (01/27 0818)  Pulse:  [61-79] 65 (01/27 0818)  BP: (113-200)/(55-86) 135/67 (01/27 0818)  Resp:  [14-30] 17 (01/27 0818)  SpO2:  [93 %-100 %] 93 % (01/27 0818)    Laboratory    Recent Labs   Lab 01/25/24  1715 01/26/24  0421 01/27/24  0452   HGB 6.3* 9.6* 9.6*       Lab Results   Component Value Date    WBC 9.26 01/27/2024    HGB 9.6 (L) 01/27/2024    HCT 30.1 (L) 01/27/2024    MCV 91 01/27/2024     (L) 01/27/2024       Lab Results   Component Value Date     (L) 01/27/2024    K 4.3 01/27/2024     01/27/2024    CO2 25 01/27/2024    BUN 43 (H) 01/27/2024    CREATININE 3.3 (H) 01/27/2024    CALCIUM 8.5 (L) 01/27/2024    ANIONGAP 7 (L) 01/27/2024    ESTGFRAFRICA 6 10/14/2023    EGFRNONAA >60.0 04/22/2022       Lab Results   Component Value Date    ALT 5 (L) 01/27/2024    AST 16 01/27/2024    ALKPHOS 99 01/27/2024    BILITOT 0.6 01/27/2024       Lab Results   Component Value Date    INR 1.3 (H) 01/23/2024    INR 1.1 01/22/2024    INR 1.2 01/12/2024     Assessment/Plan:   GI  GIB (gastrointestinal bleeding)  72 year old female with ESRD on HD, GERD, carcinoid tumor of the right middle lobe s/p thoracotomy and resection with mediastinal lymph node dissection 1/2/2024, peritonitis secondary to diffuse colonic ischemia s/p ex-lap with total colectomy and end ileostomy 1/14/24 complicated by intra-abdominal hematoma requiring evacuation and ultimately ileostomy reversal  and ileocolonic anastomosis 8/2023 with complicated hospital course now with large episode of black stools with associated clots. Hgb 5.5, PTT 80.9, INR 1.3 as of 1/23, now s/p 3 U  pRBCs, FFP and protamine without further episodes of bleeding. EGD 1/24 with candida, procedure aborted due to food in the stomach. She had a large melenic stool on 1/26 and underwent repeat EGD on 1/26 with a nonbleeding gastric ulcer.  She is HDS, with stable H&H and no further episodes of bleeding.     Recommendations:  - Continue PO Protonix 40 mg BID for 8 weeks. Will need repeat EGD in 8 weeks to check healing.   - Check H pylori stool Ag (ordered). If positive, will need treatment.   - We are signing-off. Please call with any questions.    Thank you for involving us in the care of Lily Green. Please call with any additional questions, concerns or changes in the patient's clinical status.    Nadia Herring MD  Gastroenterology      No

## 2024-03-12 ENCOUNTER — APPOINTMENT (OUTPATIENT)
Dept: HUMAN REPRODUCTION | Facility: CLINIC | Age: 39
End: 2024-03-12
Payer: COMMERCIAL

## 2024-03-12 ENCOUNTER — TRANSCRIPTION ENCOUNTER (OUTPATIENT)
Age: 39
End: 2024-03-12

## 2024-03-12 PROCEDURE — 76830 TRANSVAGINAL US NON-OB: CPT

## 2024-03-12 PROCEDURE — 36415 COLL VENOUS BLD VENIPUNCTURE: CPT

## 2024-03-12 PROCEDURE — 99205 OFFICE O/P NEW HI 60 MIN: CPT | Mod: 25

## 2024-03-21 ENCOUNTER — APPOINTMENT (OUTPATIENT)
Dept: HUMAN REPRODUCTION | Facility: CLINIC | Age: 39
End: 2024-03-21

## 2024-04-05 ENCOUNTER — APPOINTMENT (OUTPATIENT)
Dept: HUMAN REPRODUCTION | Facility: CLINIC | Age: 39
End: 2024-04-05

## 2024-05-02 ENCOUNTER — APPOINTMENT (OUTPATIENT)
Dept: HUMAN REPRODUCTION | Facility: CLINIC | Age: 39
End: 2024-05-02
Payer: COMMERCIAL

## 2024-05-02 PROCEDURE — 99213 OFFICE O/P EST LOW 20 MIN: CPT

## 2024-06-05 NOTE — ED PROVIDER NOTE - HIV OFFER
Opt out Admission Reconciliation is Completed  Discharge Reconciliation is Not Complete Admission Reconciliation is Completed  Discharge Reconciliation is Completed

## 2024-10-28 ENCOUNTER — EMERGENCY (EMERGENCY)
Facility: HOSPITAL | Age: 39
LOS: 1 days | Discharge: HOME | End: 2024-10-28
Attending: EMERGENCY MEDICINE | Admitting: EMERGENCY MEDICINE
Payer: COMMERCIAL

## 2024-10-28 VITALS
RESPIRATION RATE: 16 BRPM | HEART RATE: 66 BPM | WEIGHT: 149.91 LBS | TEMPERATURE: 98 F | SYSTOLIC BLOOD PRESSURE: 108 MMHG | DIASTOLIC BLOOD PRESSURE: 74 MMHG | OXYGEN SATURATION: 100 %

## 2024-10-28 DIAGNOSIS — Z90.721 ACQUIRED ABSENCE OF OVARIES, UNILATERAL: Chronic | ICD-10-CM

## 2024-10-28 DIAGNOSIS — Z98.890 OTHER SPECIFIED POSTPROCEDURAL STATES: Chronic | ICD-10-CM

## 2024-10-28 LAB
ALBUMIN SERPL ELPH-MCNC: 4.3 G/DL — SIGNIFICANT CHANGE UP (ref 3.3–5)
ALP SERPL-CCNC: 78 U/L — SIGNIFICANT CHANGE UP (ref 40–120)
ALT FLD-CCNC: 13 U/L — SIGNIFICANT CHANGE UP (ref 4–33)
ANION GAP SERPL CALC-SCNC: 10 MMOL/L — SIGNIFICANT CHANGE UP (ref 7–14)
APPEARANCE UR: CLEAR — SIGNIFICANT CHANGE UP
AST SERPL-CCNC: 28 U/L — SIGNIFICANT CHANGE UP (ref 4–32)
BASOPHILS # BLD AUTO: 0.01 K/UL — SIGNIFICANT CHANGE UP (ref 0–0.2)
BASOPHILS NFR BLD AUTO: 0.2 % — SIGNIFICANT CHANGE UP (ref 0–2)
BILIRUB SERPL-MCNC: 0.6 MG/DL — SIGNIFICANT CHANGE UP (ref 0.2–1.2)
BILIRUB UR-MCNC: NEGATIVE — SIGNIFICANT CHANGE UP
BUN SERPL-MCNC: 12 MG/DL — SIGNIFICANT CHANGE UP (ref 7–23)
CALCIUM SERPL-MCNC: 9.4 MG/DL — SIGNIFICANT CHANGE UP (ref 8.4–10.5)
CHLORIDE SERPL-SCNC: 103 MMOL/L — SIGNIFICANT CHANGE UP (ref 98–107)
CO2 SERPL-SCNC: 28 MMOL/L — SIGNIFICANT CHANGE UP (ref 22–31)
COLOR SPEC: YELLOW — SIGNIFICANT CHANGE UP
CREAT SERPL-MCNC: 0.79 MG/DL — SIGNIFICANT CHANGE UP (ref 0.5–1.3)
DIFF PNL FLD: NEGATIVE — SIGNIFICANT CHANGE UP
EGFR: 97 ML/MIN/1.73M2 — SIGNIFICANT CHANGE UP
EOSINOPHIL # BLD AUTO: 0.02 K/UL — SIGNIFICANT CHANGE UP (ref 0–0.5)
EOSINOPHIL NFR BLD AUTO: 0.5 % — SIGNIFICANT CHANGE UP (ref 0–6)
GLUCOSE SERPL-MCNC: 75 MG/DL — SIGNIFICANT CHANGE UP (ref 70–99)
GLUCOSE UR QL: NEGATIVE MG/DL — SIGNIFICANT CHANGE UP
HCG SERPL-ACNC: 1.1 MIU/ML — SIGNIFICANT CHANGE UP
HCT VFR BLD CALC: 34.8 % — SIGNIFICANT CHANGE UP (ref 34.5–45)
HGB BLD-MCNC: 11.4 G/DL — LOW (ref 11.5–15.5)
IANC: 2 K/UL — SIGNIFICANT CHANGE UP (ref 1.8–7.4)
IMM GRANULOCYTES NFR BLD AUTO: 0.2 % — SIGNIFICANT CHANGE UP (ref 0–0.9)
KETONES UR-MCNC: NEGATIVE MG/DL — SIGNIFICANT CHANGE UP
LEUKOCYTE ESTERASE UR-ACNC: NEGATIVE — SIGNIFICANT CHANGE UP
LIDOCAIN IGE QN: 23 U/L — SIGNIFICANT CHANGE UP (ref 7–60)
LYMPHOCYTES # BLD AUTO: 1.82 K/UL — SIGNIFICANT CHANGE UP (ref 1–3.3)
LYMPHOCYTES # BLD AUTO: 43.4 % — SIGNIFICANT CHANGE UP (ref 13–44)
MCHC RBC-ENTMCNC: 23.6 PG — LOW (ref 27–34)
MCHC RBC-ENTMCNC: 32.8 GM/DL — SIGNIFICANT CHANGE UP (ref 32–36)
MCV RBC AUTO: 71.9 FL — LOW (ref 80–100)
MONOCYTES # BLD AUTO: 0.33 K/UL — SIGNIFICANT CHANGE UP (ref 0–0.9)
MONOCYTES NFR BLD AUTO: 7.9 % — SIGNIFICANT CHANGE UP (ref 2–14)
NEUTROPHILS # BLD AUTO: 2 K/UL — SIGNIFICANT CHANGE UP (ref 1.8–7.4)
NEUTROPHILS NFR BLD AUTO: 47.8 % — SIGNIFICANT CHANGE UP (ref 43–77)
NITRITE UR-MCNC: NEGATIVE — SIGNIFICANT CHANGE UP
NRBC # BLD: 0 /100 WBCS — SIGNIFICANT CHANGE UP (ref 0–0)
NRBC # FLD: 0 K/UL — SIGNIFICANT CHANGE UP (ref 0–0)
PH UR: 6.5 — SIGNIFICANT CHANGE UP (ref 5–8)
PLATELET # BLD AUTO: 266 K/UL — SIGNIFICANT CHANGE UP (ref 150–400)
POTASSIUM SERPL-MCNC: 4 MMOL/L — SIGNIFICANT CHANGE UP (ref 3.5–5.3)
POTASSIUM SERPL-SCNC: 4 MMOL/L — SIGNIFICANT CHANGE UP (ref 3.5–5.3)
PROT SERPL-MCNC: 7.5 G/DL — SIGNIFICANT CHANGE UP (ref 6–8.3)
PROT UR-MCNC: NEGATIVE MG/DL — SIGNIFICANT CHANGE UP
RBC # BLD: 4.84 M/UL — SIGNIFICANT CHANGE UP (ref 3.8–5.2)
RBC # FLD: 14.5 % — SIGNIFICANT CHANGE UP (ref 10.3–14.5)
SODIUM SERPL-SCNC: 141 MMOL/L — SIGNIFICANT CHANGE UP (ref 135–145)
SP GR SPEC: 1.02 — SIGNIFICANT CHANGE UP (ref 1–1.03)
UROBILINOGEN FLD QL: 0.2 MG/DL — SIGNIFICANT CHANGE UP (ref 0.2–1)
WBC # BLD: 4.19 K/UL — SIGNIFICANT CHANGE UP (ref 3.8–10.5)
WBC # FLD AUTO: 4.19 K/UL — SIGNIFICANT CHANGE UP (ref 3.8–10.5)

## 2024-10-28 PROCEDURE — 99285 EMERGENCY DEPT VISIT HI MDM: CPT

## 2024-10-28 PROCEDURE — 74177 CT ABD & PELVIS W/CONTRAST: CPT | Mod: 26,MC

## 2024-10-28 PROCEDURE — 76830 TRANSVAGINAL US NON-OB: CPT | Mod: 26

## 2024-10-28 RX ORDER — SODIUM CHLORIDE 0.9 % (FLUSH) 0.9 %
1000 SYRINGE (ML) INJECTION ONCE
Refills: 0 | Status: COMPLETED | OUTPATIENT
Start: 2024-10-28 | End: 2024-10-28

## 2024-10-28 RX ORDER — KETOROLAC TROMETHAMINE 10 MG/1
15 TABLET, FILM COATED ORAL ONCE
Refills: 0 | Status: DISCONTINUED | OUTPATIENT
Start: 2024-10-28 | End: 2024-10-28

## 2024-10-28 RX ADMIN — KETOROLAC TROMETHAMINE 15 MILLIGRAM(S): 10 TABLET, FILM COATED ORAL at 10:50

## 2024-10-28 RX ADMIN — Medication 1000 MILLILITER(S): at 10:50

## 2024-10-28 NOTE — ED ADULT NURSE NOTE - OBJECTIVE STATEMENT
patient alert ox4 came in c/o lower abdominal pain started since 2 weeks and getting worst. states she usually has nausea from endometriosis , labs done and me s given as ordered. awaiting for CT/US.

## 2024-10-28 NOTE — ED PROVIDER NOTE - PROGRESS NOTE DETAILS
Patient with pain improved. No acute pathology on CT or TVUS. Most likely pain related to endometriosis. Patient counseled on results and with follow-up to her PMD and GYN. Safe and stable for discharge home.

## 2024-10-28 NOTE — ED PROVIDER NOTE - PHYSICAL EXAMINATION
Physical Exam  GEN: Alert and oriented x 3, in no acute distress, speaking full clear sentences  HEENT: NC/AT, PERRL, EOMI, normal oropharynx  NECK: Supple, nontender, FROM  CV: RRR, no m/r/g  PULM: CTA bilat, no wheezing/rales/rhonchi  ABD: Soft, +mild RLQ TTP; +TTP in R groin region with no significant palpable hernia or mass in inguinal canal.  EXTR: FROM to all extremities, nontender, no edema  SKIN: Warm, dry, no rash  NEURO: AOx3, speaking full clear sentences, WEBSTER 5/5 strength, ambulating with stable gait

## 2024-10-28 NOTE — ED PROVIDER NOTE - ATTENDING CONTRIBUTION TO CARE
40 y/o Female with a past medical history of  endometriosis, multiple pelvic surgeries, PTED with RLQP over the past three days as described; pain radiates to R groin. Pt denies other s/s VSS  PE: +RLQ TTP   IMP; Abdominal pain   Considerations: doubt hernia but would CT for both r/o appy significant pelvic pathology. No s/s/ to suggest obtuction  Plan   labs non actionable   analgesics   CT/US to evaluate for above

## 2024-10-28 NOTE — ED PROVIDER NOTE - OBJECTIVE STATEMENT
40 y/o F with PMH endometriosis s/p R adnexal laparoscopic endometrioma excision p/w R sided groin pain x3 days. Patient states for the past 3 days she has been having L groin pain, worse with sitting, coughing or moving in certain positions. She denies any fevers, chills, vomiting, diarrhea, constipation. No new vaginal bleeding or discharge. Patient still has pain related to endometriosis but states this pain is slightly different.

## 2024-10-28 NOTE — ED PROVIDER NOTE - CLINICAL SUMMARY MEDICAL DECISION MAKING FREE TEXT BOX
40 y/o F with PMH endometriosis, multiple pelvic surgeries, p/w R groin pain x3 days. Patient hemodynamically stable, afebrile, otherwise well-appearing on exam. +RLQ TTP with R groin TTP in the inguinal canal region. No obvious palpable masses or hernias on exam. Abdomen is soft with no rebound or guarding. No overlying skin changes. Most likely inguinal hernia, low concern for incarceration vs. strangulation at this time. Will obtain CT a/p, basic labs, lipase/lfts, pelvic ultrasound given endometriosis history, b-hcg. Will trial pain meds and reassess. Dispo pending full w/u.

## 2024-10-28 NOTE — ED PROVIDER NOTE - PATIENT PORTAL LINK FT
You can access the FollowMyHealth Patient Portal offered by [facility] by registering at the following website: [email or web address]. By joining [employer]’s FollowMyHealth portal, you will also be able to view your health information using other applications (apps) compatible with our system.

## 2024-10-28 NOTE — ED ADULT NURSE NOTE - PAIN RATING/NUMBER SCALE (0-10): ACTIVITY
SW SPOKE PT VIA PHONE TO ADDRESS DEPRESSION WITH NO SI / HI  PT RESIDES WITH MOM, BROTHER AND YOUNGEST SON  PT STATES PREGNANCY WAS NOT PLANNED BUT WELCOME  FOB REMAIN IN [place]  PT VERBALIZED IS FEELING OVERWHELMED AND SAD DUE TO LEAVING FOB IN [place] AND HAVING HER KIDS HERE  PT HAS GOOD SUPPORT FORM HER FAMILY  STARTED A NEW JOB YESTERDAY AND FEELS CONFIDENT IT IS GOING TO HELP WITH HER MOOD  PT DENIES USAGE OF DRUG, ALCOHOL OR SMOKING  NO PRIOR MENTAL HEALTH HISTORY OR DV ISSUES  PT WAS VERY OPENED  RECEPTIVE TO FOLLOW UP WITH [facility] AT [facility]   THIS SW WILL DISCUSSED CASE WITH ESTHELA BUCKLEY FOR FOLLOW UP 
8 (severe pain)

## 2024-10-28 NOTE — ED PROVIDER NOTE - NSFOLLOWUPINSTRUCTIONS_ED_ALL_ED_FT
Please follow-up with your GYN and primary doctor.  Return to ED if you have worsening nausea, vomiting, unable to eat, fevers.  Take home ibuprofen as prescribed for pain.

## 2024-10-28 NOTE — ED ADULT NURSE NOTE - NSFALLUNIVINTERV_ED_ALL_ED
Bed/Stretcher in lowest position, wheels locked, appropriate side rails in place/Call bell, personal items and telephone in reach/Instruct patient to call for assistance before getting out of bed/chair/stretcher/Non-slip footwear applied when patient is off stretcher/[place] to call system/Physically safe environment - no spills, clutter or unnecessary equipment/Purposeful proactive rounding/Room/bathroom lighting operational, light cord in reach

## 2024-10-28 NOTE — ED ADULT TRIAGE NOTE - CHIEF COMPLAINT QUOTE
Pt presents to ED ambulatory from home with c/o R sided groin pain, lower abdominal pain, and back pain x 1 week. Pt denies heavy lifting, pushing, pulling, injury or trauma. Pt reports pain worse on movement. Pt denies urinary symptoms. Pt endorses hx of endometrioses.

## 2025-05-03 ENCOUNTER — EMERGENCY (EMERGENCY)
Facility: HOSPITAL | Age: 40
LOS: 1 days | End: 2025-05-03
Attending: EMERGENCY MEDICINE | Admitting: EMERGENCY MEDICINE
Payer: COMMERCIAL

## 2025-05-03 VITALS
RESPIRATION RATE: 15 BRPM | OXYGEN SATURATION: 100 % | DIASTOLIC BLOOD PRESSURE: 78 MMHG | SYSTOLIC BLOOD PRESSURE: 115 MMHG | HEART RATE: 64 BPM | TEMPERATURE: 98 F

## 2025-05-03 VITALS
WEIGHT: 149.91 LBS | TEMPERATURE: 99 F | HEART RATE: 68 BPM | RESPIRATION RATE: 16 BRPM | HEIGHT: 65 IN | OXYGEN SATURATION: 99 % | SYSTOLIC BLOOD PRESSURE: 101 MMHG | DIASTOLIC BLOOD PRESSURE: 62 MMHG

## 2025-05-03 DIAGNOSIS — Z90.721 ACQUIRED ABSENCE OF OVARIES, UNILATERAL: Chronic | ICD-10-CM

## 2025-05-03 DIAGNOSIS — Z98.890 OTHER SPECIFIED POSTPROCEDURAL STATES: Chronic | ICD-10-CM

## 2025-05-03 LAB
ALBUMIN SERPL ELPH-MCNC: 4.4 G/DL — SIGNIFICANT CHANGE UP (ref 3.3–5)
ALP SERPL-CCNC: 79 U/L — SIGNIFICANT CHANGE UP (ref 40–120)
ALT FLD-CCNC: 18 U/L — SIGNIFICANT CHANGE UP (ref 4–33)
ANION GAP SERPL CALC-SCNC: 10 MMOL/L — SIGNIFICANT CHANGE UP (ref 7–14)
APTT BLD: 28 SEC — SIGNIFICANT CHANGE UP (ref 26.1–36.8)
AST SERPL-CCNC: 48 U/L — HIGH (ref 4–32)
BASOPHILS # BLD AUTO: 0.02 K/UL — SIGNIFICANT CHANGE UP (ref 0–0.2)
BASOPHILS NFR BLD AUTO: 0.4 % — SIGNIFICANT CHANGE UP (ref 0–2)
BILIRUB SERPL-MCNC: 0.5 MG/DL — SIGNIFICANT CHANGE UP (ref 0.2–1.2)
BUN SERPL-MCNC: 7 MG/DL — SIGNIFICANT CHANGE UP (ref 7–23)
CALCIUM SERPL-MCNC: 9.4 MG/DL — SIGNIFICANT CHANGE UP (ref 8.4–10.5)
CHLORIDE SERPL-SCNC: 103 MMOL/L — SIGNIFICANT CHANGE UP (ref 98–107)
CO2 SERPL-SCNC: 22 MMOL/L — SIGNIFICANT CHANGE UP (ref 22–31)
CREAT SERPL-MCNC: 0.66 MG/DL — SIGNIFICANT CHANGE UP (ref 0.5–1.3)
EGFR: 114 ML/MIN/1.73M2 — SIGNIFICANT CHANGE UP
EGFR: 114 ML/MIN/1.73M2 — SIGNIFICANT CHANGE UP
EOSINOPHIL # BLD AUTO: 0.05 K/UL — SIGNIFICANT CHANGE UP (ref 0–0.5)
EOSINOPHIL NFR BLD AUTO: 1 % — SIGNIFICANT CHANGE UP (ref 0–6)
GLUCOSE SERPL-MCNC: 78 MG/DL — SIGNIFICANT CHANGE UP (ref 70–99)
HCG SERPL-ACNC: <1 MIU/ML — SIGNIFICANT CHANGE UP
HCT VFR BLD CALC: 34.4 % — LOW (ref 34.5–45)
HGB BLD-MCNC: 11 G/DL — LOW (ref 11.5–15.5)
IANC: 2.41 K/UL — SIGNIFICANT CHANGE UP (ref 1.8–7.4)
IMM GRANULOCYTES NFR BLD AUTO: 0.2 % — SIGNIFICANT CHANGE UP (ref 0–0.9)
INR BLD: 0.97 RATIO — SIGNIFICANT CHANGE UP (ref 0.85–1.16)
LYMPHOCYTES # BLD AUTO: 1.94 K/UL — SIGNIFICANT CHANGE UP (ref 1–3.3)
LYMPHOCYTES # BLD AUTO: 40 % — SIGNIFICANT CHANGE UP (ref 13–44)
MCHC RBC-ENTMCNC: 22.8 PG — LOW (ref 27–34)
MCHC RBC-ENTMCNC: 32 G/DL — SIGNIFICANT CHANGE UP (ref 32–36)
MCV RBC AUTO: 71.2 FL — LOW (ref 80–100)
MONOCYTES # BLD AUTO: 0.42 K/UL — SIGNIFICANT CHANGE UP (ref 0–0.9)
MONOCYTES NFR BLD AUTO: 8.7 % — SIGNIFICANT CHANGE UP (ref 2–14)
NEUTROPHILS # BLD AUTO: 2.41 K/UL — SIGNIFICANT CHANGE UP (ref 1.8–7.4)
NEUTROPHILS NFR BLD AUTO: 49.7 % — SIGNIFICANT CHANGE UP (ref 43–77)
NRBC # BLD AUTO: 0 K/UL — SIGNIFICANT CHANGE UP (ref 0–0)
NRBC # FLD: 0 K/UL — SIGNIFICANT CHANGE UP (ref 0–0)
NRBC BLD AUTO-RTO: 0 /100 WBCS — SIGNIFICANT CHANGE UP (ref 0–0)
PLATELET # BLD AUTO: 298 K/UL — SIGNIFICANT CHANGE UP (ref 150–400)
POTASSIUM SERPL-MCNC: 5.5 MMOL/L — HIGH (ref 3.5–5.3)
POTASSIUM SERPL-SCNC: 5.5 MMOL/L — HIGH (ref 3.5–5.3)
PROT SERPL-MCNC: 8.1 G/DL — SIGNIFICANT CHANGE UP (ref 6–8.3)
PROTHROM AB SERPL-ACNC: 11.6 SEC — SIGNIFICANT CHANGE UP (ref 9.9–13.4)
RBC # BLD: 4.83 M/UL — SIGNIFICANT CHANGE UP (ref 3.8–5.2)
RBC # FLD: 15.1 % — HIGH (ref 10.3–14.5)
SODIUM SERPL-SCNC: 135 MMOL/L — SIGNIFICANT CHANGE UP (ref 135–145)
WBC # BLD: 4.85 K/UL — SIGNIFICANT CHANGE UP (ref 3.8–10.5)
WBC # FLD AUTO: 4.85 K/UL — SIGNIFICANT CHANGE UP (ref 3.8–10.5)

## 2025-05-03 PROCEDURE — 99284 EMERGENCY DEPT VISIT MOD MDM: CPT

## 2025-05-03 PROCEDURE — 93010 ELECTROCARDIOGRAM REPORT: CPT

## 2025-05-03 RX ORDER — IBUPROFEN 200 MG
600 TABLET ORAL ONCE
Refills: 0 | Status: COMPLETED | OUTPATIENT
Start: 2025-05-03 | End: 2025-05-03

## 2025-05-03 RX ADMIN — Medication 600 MILLIGRAM(S): at 18:05

## 2025-05-03 NOTE — ED PROVIDER NOTE - PATIENT PORTAL LINK FT
You can access the FollowMyHealth Patient Portal offered by [facility] by registering at the following website: [email or web address]. By joining [employer]’s FollowMyHealth portal, you will also be able to view your health information using other applications (apps) compatible with our system.

## 2025-05-03 NOTE — ED PROVIDER NOTE - NSFOLLOWUPINSTRUCTIONS_ED_ALL_ED_FT
Follow up with your PMD within 48-72 hrs. Show copies of your reports given to you. Take all of your medications as previously prescribed.   Follow up with your OBGYN doctor as soon as possible as discussed.  Return to ER immediately for increased bleeding or worsening symptoms.

## 2025-05-03 NOTE — ED ADULT TRIAGE NOTE - CHIEF COMPLAINT QUOTE
Pt c/o heavy vaginal bleeding x 3 weeks, now endorsing sob, and weakness. respirations even and unlabored. pmhx: Reports last menstrual period in 2022. pmhx: endometriosis

## 2025-05-03 NOTE — ED PROVIDER NOTE - OBJECTIVE STATEMENT
Jovita - Patient is a 39-year-old female with history of endometriosis.  Reports that she has been having menstrual bleeding  since .  Initially bleeding was heavy with clots but then became lighter, however has again been heavy flow with clots since .  She had visit with OB/GYN doctor on  where she reports transvaginal sono was done and was advised that endometrial lining is not thick and to continue current medications.  Patient has been feeling increasingly fatigued over the past week and concerned that her blood level is low.  Had visit with PMD Dr. Angela yesterday where she had blood tests sent, but does not yet know results.  Her doctor told her to come to ER if she still felt fatigued today, so came to the ER now.  Patient is -0-1-0.  She is taking estradiol and progesterone for the past month as prescribed by her OB/GYN doctor for treatment of endometriosis.  She also is taking iron tablets, multivitamin, vitamin B12, and calcium supplementation.  She denies any chest pain or shortness of breath.  Denies abdominal pain.  Denies any dysuria or hematuria.  No syncope or near syncope.  No recent fever, cough, or other illness. Jovita - Patient is a 39-year-old female with history of endometriosis.  Reports that she has been having menstrual bleeding  since .  Initially bleeding was heavy with clots but then became lighter, however has again been heavy flow with clots since .  She had visit with OB/GYN doctor on  where she reports transvaginal sono was done and was advised that endometrial lining is not thick and to continue current medications.  Patient has been feeling increasingly fatigued over the past week and concerned that her blood level is low.  Had visit with PMD Dr. Angela yesterday where she had blood tests sent, but does not yet know results.  Her doctor told her to come to ER if she still felt fatigued today, so came to the ER now.  Patient is -0-1-0.  She is taking estradiol and progesterone for the past month as prescribed by her OB/GYN doctor for treatment of endometriosis.  She also is taking iron tablets, multivitamin, vitamin B12, and calcium supplementation.  She denies any chest pain or shortness of breath.  Reports she has suprapubic abd discomfort x many months which is attributed to endometriosis, no change in pain intensity or location.  Denies any dysuria or hematuria.  No syncope or near syncope.  No recent fever, cough, or other illness.  Patient denies recent sexual activity.

## 2025-05-03 NOTE — ED ADULT NURSE REASSESSMENT NOTE - NS ED NURSE REASSESS COMMENT FT1
Pt is A&Ox4, appears comfortable in stretcher. Pt endorses 6/10 abdominal pain, requesting ibuprofen - MD Hussein made aware. denies nausea. breathing is even and unlabored. Stretcher set in lowest position, call bell within reach, safety maintained.

## 2025-05-03 NOTE — ED PROVIDER NOTE - CLINICAL SUMMARY MEDICAL DECISION MAKING FREE TEXT BOX
40 y/o F hx endometriosis, s/p right adnexal lap endometrioma excision and oophorectomy.  Currently being treated with estrdiol and progesterone x 1 month.  c/o vag bleeding x 3 weeks.  To ED with generalized fatigue and concern for anemia.   PE - HDS.  Abd benign.  bimanual pelvic exam with blood noted on glove but no other bleeding noted.  A/P - Anemia can explain the fatigue the patient is having.  HPI and ROS does not reveal another etiology for fatigue such as infection.  Vag bleeding likely from endometriosis medications, and OBGYN suggests to continue them.  No concern for new uterine pathology as patient had recent TVS.  Will check CBC, CMP, HCG, and coags.  No need for repeat TVS at this time.

## 2025-05-03 NOTE — ED PROVIDER NOTE - PHYSICAL EXAMINATION
ATTENDING PHYSICAL EXAM  GEN - NAD; well appearing; A+O x3  HEAD - NC/AT; EYES/NOSE - PERRL, EOMI, mucous membranes moist, no discharge; THROAT: Oral cavity and pharynx normal. No inflammation, swelling, exudate, or lesions  NECK: Neck supple, non-tender without lymphadenopathy, no masses, no JVD  PULMONARY - CTA b/l, symmetric breath sounds, no w/r/r  CARDIAC -s1s2, RRR, no M,R,G  ABDOMEN - +NABS, ND, NT, soft, no guarding, no rebound, no mass appreciated.  Pelvic exam performed with chaperone WADE Breaux.  Speculum exam discontinued as patient having pain when speculum starting to be placed.  States she always has this pain from speculum exam and defers exam.  Bimanual exam performed with small amount of blood noted on glove.  No adnexal tenderness or CMT.  BACK - no CVA tenderness, No vertebral or paravertebral tenderness  EXTREMITIES - symmetric pulses, 2+ dp, capillary refill < 2 seconds, no clubbing, no cyanosis, no edema  SKIN - no rash or bruising or petechiae.

## 2025-05-03 NOTE — ED PROVIDER NOTE - PROGRESS NOTE DETAILS
Jovita - pt reassessed.  Feels better.  Ambulating well.  Labs reviewed.  Still microcytic anemia, which may be causing symptoms.  I discussed with patient including concern that medications may be leading to continued vag bleeding.  She understands need to return to OBGYN for further discussion and plan. Jovita - pt reassessed.  Feels better.  Ambulating well.  Labs reviewed.  Still microcytic anemia, which may be causing symptoms.  I discussed with patient including concern that medications may be leading to continued vag bleeding.  She understands need to return to OBGYN for further discussion and plan.  Stable for discharge.

## 2025-05-03 NOTE — ED ADULT NURSE NOTE - OBJECTIVE STATEMENT
Pt is alert and orientedx4, ambulatory at baseline. Pt presents to the ED with worsening ABD pain and vaginal bleeding for 3 weeks,. Pt reports she stopped menstruating in 2022. Pt denies chest pain, shortness of breath, dyspnea on exertion, breathing is unlabored and even. Pt denies nausea, vomiting or diarrhea. Labs sent. Pt is alert and orientedx4, ambulatory at baseline. Pt presents to the ED with worsening ABD pain and vaginal bleeding for 3 weeks. Pt has Phx of endometrioses, on progesterone and estrogen. Phx of anemia as well. Pt denies chest pain, shortness of breath, dyspnea on exertion, breathing is unlabored and even. Pt denies nausea, vomiting or diarrhea. Labs sent.

## 2025-07-07 ENCOUNTER — EMERGENCY (EMERGENCY)
Facility: HOSPITAL | Age: 40
LOS: 1 days | End: 2025-07-07
Attending: EMERGENCY MEDICINE | Admitting: STUDENT IN AN ORGANIZED HEALTH CARE EDUCATION/TRAINING PROGRAM
Payer: COMMERCIAL

## 2025-07-07 VITALS
DIASTOLIC BLOOD PRESSURE: 74 MMHG | RESPIRATION RATE: 16 BRPM | SYSTOLIC BLOOD PRESSURE: 109 MMHG | WEIGHT: 151.02 LBS | TEMPERATURE: 98 F | OXYGEN SATURATION: 98 % | HEIGHT: 65 IN | HEART RATE: 64 BPM

## 2025-07-07 DIAGNOSIS — Z90.721 ACQUIRED ABSENCE OF OVARIES, UNILATERAL: Chronic | ICD-10-CM

## 2025-07-07 DIAGNOSIS — Z98.890 OTHER SPECIFIED POSTPROCEDURAL STATES: Chronic | ICD-10-CM

## 2025-07-07 LAB
ADD ON TEST-SPECIMEN IN LAB: SIGNIFICANT CHANGE UP
ADD ON TEST-SPECIMEN IN LAB: SIGNIFICANT CHANGE UP
ALBUMIN SERPL ELPH-MCNC: 4.3 G/DL — SIGNIFICANT CHANGE UP (ref 3.3–5)
ALP SERPL-CCNC: 83 U/L — SIGNIFICANT CHANGE UP (ref 40–120)
ALT FLD-CCNC: 15 U/L — SIGNIFICANT CHANGE UP (ref 4–33)
ANION GAP SERPL CALC-SCNC: 9 MMOL/L — SIGNIFICANT CHANGE UP (ref 7–14)
ANISOCYTOSIS BLD QL: SLIGHT — SIGNIFICANT CHANGE UP
APTT BLD: 29.1 SEC — SIGNIFICANT CHANGE UP (ref 26.1–36.8)
AST SERPL-CCNC: 26 U/L — SIGNIFICANT CHANGE UP (ref 4–32)
BASOPHILS # BLD AUTO: 0.01 K/UL — SIGNIFICANT CHANGE UP (ref 0–0.2)
BASOPHILS # BLD MANUAL: 0 K/UL — SIGNIFICANT CHANGE UP (ref 0–0.2)
BASOPHILS NFR BLD AUTO: 0.3 % — SIGNIFICANT CHANGE UP (ref 0–2)
BASOPHILS NFR BLD MANUAL: 0 % — SIGNIFICANT CHANGE UP (ref 0–2)
BILIRUB SERPL-MCNC: 0.4 MG/DL — SIGNIFICANT CHANGE UP (ref 0.2–1.2)
BUN SERPL-MCNC: 7 MG/DL — SIGNIFICANT CHANGE UP (ref 7–23)
CALCIUM SERPL-MCNC: 9.7 MG/DL — SIGNIFICANT CHANGE UP (ref 8.4–10.5)
CHLORIDE SERPL-SCNC: 102 MMOL/L — SIGNIFICANT CHANGE UP (ref 98–107)
CO2 SERPL-SCNC: 26 MMOL/L — SIGNIFICANT CHANGE UP (ref 22–31)
CREAT SERPL-MCNC: 0.76 MG/DL — SIGNIFICANT CHANGE UP (ref 0.5–1.3)
DACRYOCYTES BLD QL SMEAR: SLIGHT — SIGNIFICANT CHANGE UP
EGFR: 101 ML/MIN/1.73M2 — SIGNIFICANT CHANGE UP
EGFR: 101 ML/MIN/1.73M2 — SIGNIFICANT CHANGE UP
ELLIPTOCYTES BLD QL SMEAR: SLIGHT — SIGNIFICANT CHANGE UP
EOSINOPHIL # BLD AUTO: 0.03 K/UL — SIGNIFICANT CHANGE UP (ref 0–0.5)
EOSINOPHIL # BLD MANUAL: 0 K/UL — SIGNIFICANT CHANGE UP (ref 0–0.5)
EOSINOPHIL NFR BLD AUTO: 0.8 % — SIGNIFICANT CHANGE UP (ref 0–6)
EOSINOPHIL NFR BLD MANUAL: 0 % — SIGNIFICANT CHANGE UP (ref 0–6)
GIANT PLATELETS BLD QL SMEAR: PRESENT
GLUCOSE SERPL-MCNC: 102 MG/DL — HIGH (ref 70–99)
HCT VFR BLD CALC: 37 % — SIGNIFICANT CHANGE UP (ref 34.5–45)
HGB BLD-MCNC: 11.7 G/DL — SIGNIFICANT CHANGE UP (ref 11.5–15.5)
IMM GRANULOCYTES # BLD AUTO: 0.01 K/UL — SIGNIFICANT CHANGE UP (ref 0–0.07)
IMM GRANULOCYTES NFR BLD AUTO: 0.3 % — SIGNIFICANT CHANGE UP (ref 0–0.9)
INR BLD: <0.9 RATIO — SIGNIFICANT CHANGE UP (ref 0.85–1.16)
LYMPHOCYTES # BLD AUTO: 1.66 K/UL — SIGNIFICANT CHANGE UP (ref 1–3.3)
LYMPHOCYTES # BLD MANUAL: 1.49 K/UL — SIGNIFICANT CHANGE UP (ref 1–3.3)
LYMPHOCYTES NFR BLD AUTO: 43.3 % — SIGNIFICANT CHANGE UP (ref 13–44)
LYMPHOCYTES NFR BLD MANUAL: 38.9 % — SIGNIFICANT CHANGE UP (ref 13–44)
MANUAL REACTIVE LYMPHOCYTES #: 0.03 K/UL — SIGNIFICANT CHANGE UP (ref 0–0.63)
MCHC RBC-ENTMCNC: 23.3 PG — LOW (ref 27–34)
MCHC RBC-ENTMCNC: 31.6 G/DL — LOW (ref 32–36)
MCV RBC AUTO: 73.7 FL — LOW (ref 80–100)
MICROCYTES BLD QL: SLIGHT — SIGNIFICANT CHANGE UP
MONOCYTES # BLD AUTO: 0.4 K/UL — SIGNIFICANT CHANGE UP (ref 0–0.9)
MONOCYTES # BLD MANUAL: 0.34 K/UL — SIGNIFICANT CHANGE UP (ref 0–0.9)
MONOCYTES NFR BLD AUTO: 10.4 % — SIGNIFICANT CHANGE UP (ref 2–14)
MONOCYTES NFR BLD MANUAL: 8.8 % — SIGNIFICANT CHANGE UP (ref 2–14)
NEUTROPHILS # BLD AUTO: 1.72 K/UL — LOW (ref 1.8–7.4)
NEUTROPHILS # BLD MANUAL: 1.97 K/UL — SIGNIFICANT CHANGE UP (ref 1.8–7.4)
NEUTROPHILS NFR BLD AUTO: 44.9 % — SIGNIFICANT CHANGE UP (ref 43–77)
NEUTROPHILS NFR BLD MANUAL: 51.4 % — SIGNIFICANT CHANGE UP (ref 43–77)
NRBC # BLD AUTO: 0 K/UL — SIGNIFICANT CHANGE UP (ref 0–0)
NRBC # FLD: 0 K/UL — SIGNIFICANT CHANGE UP (ref 0–0)
NRBC BLD AUTO-RTO: 0 /100 WBCS — SIGNIFICANT CHANGE UP (ref 0–0)
OVALOCYTES BLD QL SMEAR: ABNORMAL
PLAT MORPH BLD: NORMAL — SIGNIFICANT CHANGE UP
PLATELET # BLD AUTO: 256 K/UL — SIGNIFICANT CHANGE UP (ref 150–400)
PLATELET COUNT - ESTIMATE: NORMAL — SIGNIFICANT CHANGE UP
PMV BLD: 11.3 FL — SIGNIFICANT CHANGE UP (ref 7–13)
POIKILOCYTOSIS BLD QL AUTO: SLIGHT — SIGNIFICANT CHANGE UP
POTASSIUM SERPL-MCNC: 4.6 MMOL/L — SIGNIFICANT CHANGE UP (ref 3.5–5.3)
POTASSIUM SERPL-SCNC: 4.6 MMOL/L — SIGNIFICANT CHANGE UP (ref 3.5–5.3)
PROT SERPL-MCNC: 8 G/DL — SIGNIFICANT CHANGE UP (ref 6–8.3)
PROTHROM AB SERPL-ACNC: 10.6 SEC — SIGNIFICANT CHANGE UP (ref 9.9–13.4)
RBC # BLD: 5.02 M/UL — SIGNIFICANT CHANGE UP (ref 3.8–5.2)
RBC # FLD: 14.4 % — SIGNIFICANT CHANGE UP (ref 10.3–14.5)
RBC BLD AUTO: ABNORMAL
SCHISTOCYTES BLD QL AUTO: SLIGHT — SIGNIFICANT CHANGE UP
SMUDGE CELLS # BLD: PRESENT
SODIUM SERPL-SCNC: 137 MMOL/L — SIGNIFICANT CHANGE UP (ref 135–145)
TROPONIN T, HIGH SENSITIVITY RESULT: <6 NG/L — SIGNIFICANT CHANGE UP
VARIANT LYMPHS # BLD: 0.9 % — SIGNIFICANT CHANGE UP (ref 0–6)
VARIANT LYMPHS NFR BLD MANUAL: 0.9 % — SIGNIFICANT CHANGE UP (ref 0–6)
WBC # BLD: 3.83 K/UL — SIGNIFICANT CHANGE UP (ref 3.8–10.5)
WBC # FLD AUTO: 3.83 K/UL — SIGNIFICANT CHANGE UP (ref 3.8–10.5)

## 2025-07-07 PROCEDURE — 99223 1ST HOSP IP/OBS HIGH 75: CPT

## 2025-07-07 PROCEDURE — 70496 CT ANGIOGRAPHY HEAD: CPT | Mod: 26

## 2025-07-07 PROCEDURE — 93010 ELECTROCARDIOGRAM REPORT: CPT

## 2025-07-07 PROCEDURE — 71046 X-RAY EXAM CHEST 2 VIEWS: CPT | Mod: 26

## 2025-07-07 PROCEDURE — 70498 CT ANGIOGRAPHY NECK: CPT | Mod: 26

## 2025-07-07 RX ORDER — MECLIZINE HCL 12.5 MG
25 TABLET ORAL ONCE
Refills: 0 | Status: COMPLETED | OUTPATIENT
Start: 2025-07-07 | End: 2025-07-07

## 2025-07-07 RX ORDER — METOCLOPRAMIDE HCL 10 MG
10 TABLET ORAL ONCE
Refills: 0 | Status: COMPLETED | OUTPATIENT
Start: 2025-07-07 | End: 2025-07-07

## 2025-07-07 RX ORDER — IBUPROFEN 200 MG
600 TABLET ORAL ONCE
Refills: 0 | Status: COMPLETED | OUTPATIENT
Start: 2025-07-07 | End: 2025-07-07

## 2025-07-07 RX ORDER — SODIUM CHLORIDE 9 G/1000ML
1000 INJECTION, SOLUTION INTRAVENOUS ONCE
Refills: 0 | Status: COMPLETED | OUTPATIENT
Start: 2025-07-07 | End: 2025-07-07

## 2025-07-07 RX ADMIN — Medication 600 MILLIGRAM(S): at 23:48

## 2025-07-07 RX ADMIN — Medication 25 MILLIGRAM(S): at 17:37

## 2025-07-07 RX ADMIN — Medication 10 MILLIGRAM(S): at 17:37

## 2025-07-07 RX ADMIN — SODIUM CHLORIDE 1000 MILLILITER(S): 9 INJECTION, SOLUTION INTRAVENOUS at 17:36

## 2025-07-07 RX ADMIN — Medication 150 MILLILITER(S): at 23:46

## 2025-07-07 NOTE — ED ADULT NURSE NOTE - OBJECTIVE STATEMENT
A&Ox4. ambulatory. c/o chest pain, SOB, nausea, multiple pre-syncopal episodes over the past 2 days. currently on menses. PMH endometriosis. NAD. pt denies dizziness, weakness, urinary symptoms, HA, n/v/d, fevers, chills. respirations are even and unlabored. skin intact. 20g placed to LAC. labs drawn and sent. safety precautions maintained. call bell at bedside. EKG obtained.

## 2025-07-07 NOTE — ED PROVIDER NOTE - CLINICAL SUMMARY MEDICAL DECISION MAKING FREE TEXT BOX
40f presents to the ed with concern for dizziness which started three days ago, has been experiencing waxing and waning symptoms, room spinning and lightheaded in nature, difficulty ambulating 2/2 dizziness. Remainder of neuro exam without localizing deficit. Plan for labs, cta h/n, ekg, symptomatic tx, reass. 40f presents to the ed with concern for dizziness which started three days ago, has been experiencing waxing and waning symptoms, room spinning and lightheaded in nature, difficulty ambulating 2/2 dizziness. Remainder of neuro exam without localizing deficit. Plan for labs, cta h/n, ekg, symptomatic tx, reass.    Sx seem to be improving, gait appears improved even from arrival, given improvement, anticipate neurology evaluation, further plan of action TBD by their input and imaging

## 2025-07-07 NOTE — CONSULT NOTE ADULT - ASSESSMENT
40-year-old woman with endometriosis reports hospital secondary to dizziness best described as lightheaded sensation/presyncope.  Patient reports that symptoms only occur upon standing and walking around, resolved at rest. Patient does report issues with endometriosis and vaginal bleeding but nothing more out of the ordinary, patient has poor appetite. No focal neurological symptoms reported.  Neurological examination unremarkable expect for slightly unsteady gait but does not appear neurological.        Impression: Transient positional lightheadedness with generalized weakness, no focal neurological symptoms, like orthostatic hypotension vs hypovolemia vs cardiogenic etiology, less likely primarily neurological etiology    Recommendations  - Orthostatic vitals  Further recs pending, NOTE?RECS INCOMPLETE 40-year-old woman with endometriosis reports hospital secondary to dizziness best described as lightheaded sensation/presyncope.  Patient reports that symptoms only occur upon standing and walking around, resolved at rest. Patient does report issues with endometriosis and vaginal bleeding but nothing more out of the ordinary, patient has poor appetite. No focal neurological symptoms reported.  Neurological examination unremarkable expect for slightly unsteady gait but does not appear neurological.      Impression: Transient positional lightheadedness with generalized weakness, no focal neurological symptoms, like orthostatic hypotension vs hypovolemia vs cardiogenic etiology, less likely primarily neurological etiology    Recommendations  - Orthostatic vitals  - No further inpatient neurological workup at this time, no need for MR Brain  - Would defer to primary team for cardiac and metabolic workup given symptoms    Case discussed with overnight neurology attending, Dwaine Brar

## 2025-07-07 NOTE — ED PROVIDER NOTE - OBJECTIVE STATEMENT
40f presents to the ed with acute onset dizziness three days ago with continued waxing and waning dizziness since. Reports the first episode occurred while standing at checkout at a store few d ago, felt tunneling of her vision and then room spinning dizziness, passed shortly after but stayed in bed for most of day as didn't completely resolve. Has happened several times since, most recently in the car on the way here, [place] with positional change. A/w nausea, No fevers, ha, ear pain, ringing, neck pain, stiffness, cp, sob, palpitations, abd pain, vomiting, diarrhea, dysuria, edema. 40f presents to the ed with acute onset dizziness three days ago with continued waxing and waning dizziness since. Reports the first episode occurred while standing at checkout at a store few d ago, felt tunneling of her vision and then room spinning dizziness, passed shortly after but stayed in bed for most of day as didn't completely resolve. Has happened several times since, most recently in the car on the way here, [place] with positional change. A/w nausea, No fevers, ha, ear pain, ringing, neck pain, stiffness, cp, sob, palpitations, abd pain, vomiting, diarrhea, dysuria, edema. Also denies recent URI or illness. Mother and GM had history of stroke.

## 2025-07-07 NOTE — ED CDU PROVIDER INITIAL DAY NOTE - CLINICAL SUMMARY MEDICAL DECISION MAKING FREE TEXT BOX
Pt is a 41 YO F with no significant PMH who presented to ED with dizziness/lightheadedness x 3 days. In ED, no acute electrolyte abnormalities, trop <6, CT imaging with no acute findings. Pt seen and examined by Neurology. Pt placed in CDU for supportive care, cardiac monitoring, echo.

## 2025-07-07 NOTE — ED CDU PROVIDER INITIAL DAY NOTE - OBJECTIVE STATEMENT
40f presents to the ed with acute onset dizziness three days ago with continued waxing and waning dizziness since. Reports the first episode occurred while standing at checkout at a store few d ago, felt tunneling of her vision and then room spinning dizziness, passed shortly after but stayed in bed for most of day as didn't completely resolve. Has happened several times since, most recently in the car on the way here, [place] with positional change. A/w nausea, No fevers, ha, ear pain, ringing, neck pain, stiffness, cp, sob, palpitations, abd pain, vomiting, diarrhea, dysuria, edema. Also denies recent URI or illness. Mother and GM had history of stroke.    ED HPI as above, noted  Pt is a 39 YO F with no significant PMH who presented to ED with dizziness/lightheadedness x 3 days. In ED, no acute electrolyte abnormalities, trop <6, CT imaging with no acute findings. Pt seen and examined by Neurology. Pt placed in CDU for supportive care, cardiac monitoring, echo.

## 2025-07-07 NOTE — ED ADULT NURSE NOTE - CHIEF COMPLAINT QUOTE
Patient c/o multiple pre-syncopal episodes over the past 2 days. Reports chest pain, SOB and nausea. No signs of respiratory distress. Denies fever. Phx endometriosis, reports her period started 2 days ago but denies heavy bleeding

## 2025-07-07 NOTE — ED CDU PROVIDER INITIAL DAY NOTE - PHYSICAL EXAMINATION
Neuro: strength, sensation and pulses intact, raises eyes, smiles, puffs cheeks symmetrically, no drift, no nystagmus, + guarded gait

## 2025-07-07 NOTE — ED PROVIDER NOTE - PHYSICAL EXAMINATION
GEN - NAD, nontoxic appearing; A+O x3   HEAD - NC/AT   EYES- PERRL, EOMI  ENT: Airway patent, mmm, Oral cavity and pharynx normal. No inflammation, swelling, exudate, or lesions.    NECK: Neck supple  PULMONARY - CTA b/l, symmetric breath sounds.   CARDIAC -s1s2, RRR, no M,G,R  ABDOMEN - +BS, ND, NT, soft, no guarding, no rebound, no masses   BACK - no CVA tenderness, Normal  spine   EXTREMITIES - FROM, symmetric pulses, capillary refill < 2 seconds, no edema   SKIN - no rash or bruising   NEUROLOGIC - ao3, cn2-12 intact, 5/5 strength in all extremities, sensation grossly intact, f-n nl, no dysdiadochokinesia, unable to fully assess gait as feels unsteady on standing.  PSYCH -nl mood/affect, nl insight. GEN - NAD, nontoxic appearing; A+O x3   HEAD - NC/AT   EYES- PERRL, EOMI  ENT: Airway patent, mmm, Oral cavity and pharynx normal. No inflammation, swelling, exudate, or lesions.    NECK: Neck supple  PULMONARY - CTA b/l, symmetric breath sounds.   CARDIAC -s1s2, RRR, no M,G,R  ABDOMEN - +BS, ND, NT, soft, no guarding, no rebound, no masses   BACK - no CVA tenderness, Normal  spine   EXTREMITIES - FROM, symmetric pulses, capillary refill < 2 seconds, no edema   SKIN - no rash or bruising   NEUROLOGIC - ao3, cn2-12 intact, 5/5 strength in all extremities, sensation grossly intact, f-n nl, no dysdiadochokinesia, unable to fully assess gait as feels unsteady on standing. nml finger to nose, nml heel to shin, perhaps subtile nystagmus to left, head impulse test worsens nystagmus  PSYCH -nl mood/affect, nl insight.

## 2025-07-07 NOTE — ED PROVIDER NOTE - PROGRESS NOTE DETAILS
resting comfortably in bed states headache is better, back from Ct scanner Assisted walking to and from the bed to the bathroom, She feels better, but she does appear to have somewhat of a "rolling" gait, one might expect to see from someone on a boat patient reporting she feels better but still not back to baseline, results thus far d/w her, neuro evaluated, recs appreciated, will place in cdu for further monitoring, testing, and care. Case d/w cdu pa who is accepting, patient agrees to this plan.

## 2025-07-07 NOTE — ED CDU PROVIDER INITIAL DAY NOTE - AVIAN FLU SYMPTOMS
OT Acute Treatment  Plan of Care Note            Pt seen on 3 [facility] nursing unit.        ASSESSMENT:   Emphasis of session included mobility. Pt seated in bedside chair and requesting to go to bed. Marisela steady and gait belt used. He required max assist x 2 sit-stand , with increased assist to extend at hips so pads could be placed under him. Pt fatigued easily in the marisela steady. Sitting balance at edge of bed with min assist (poor tolerance when sitting unsupported). Sit-supine with max assist x 2. Pt required total assist to roll to the left for positioning a pillow under his bottom, and total assist to position legs on pillows to float his heals.  \  Patient's overall level of function is below his baseline    Patient's response to treatment max assist due to poor endurance    Continued skilled therapy is indicated to address deficits in endurance, strength, self cares, balance, ADL transfers, and safety to maximize independence for discharge to least restrictive environment  Patient is making slow progress toward goals due to increased medical complexity and poor endurance    OT Identified Barriers to Discharge: weakness, endurance, decreased ability to care for self     PLAN:   Continue skilled OT, including the following Treatment Interventions: ADL retraining;Functional transfer training;UE strengthening/ROM;Endurance training;Cognitive reorientation;Patient/Family training;Equipment eval/education;Neuro muscular reeducation;Fine motor coordination activities;Compensatory technique education (08/24/18 1601)   OT Frequency: 5 days/week (08/24/18 1601)    Treatment Plan for Next Session: UE ADL's, functional transfers  Additional Plan Considerations: lives with wife, caregiver 3x/week       DIAGNOSIS:  1. Acute cholecystitis    2. Pleural effusion, left    3. Sepsis, due to unspecified organism (CMS/[facility])    4. Palliative care by specialist        Co-morbidities:   Patient Active Problem List   Diagnosis   •  Other and unspecified hyperlipidemia   • Type II or unspecified type diabetes mellitus without mention of complication, not stated as uncontrolled   • Obesity   • ASVD (arteriosclerotic vascular disease)   • HTN (hypertension)   • Essential tremor   • BPH (benign prostatic hypertrophy)   • CHRISTIAN (obstructive sleep apnea)   • Prostate cancer (CMS/[facility])   • IDDM (insulin dependent diabetes mellitus) (CMS/[facility])   • Peripheral autonomic neuropathy in disorders classified elsewhere(337.1)   • Dermatophytosis of nail   • Edema   • Unspecified venous (peripheral) insufficiency   • Other hammer toe (acquired)   • Abnormality of gait   • Memory loss   • Other nonspecific abnormal result of function study of brain and central nervous system   • Normal pressure hydrocephalus   • Incontinence of urine   • S/P CABG x 3   • expected acute post op blood loss   • Long term (current) use of anticoagulants   • Nonhealing surgical wound   • Pathologic fracture of vertebrae   • Lumbar spinal stenosis   • Compression deformity of vertebra   • Closed fracture of lumbar vertebra without mention of spinal cord injury   • Sepsis (CMS/[facility])   • Acute UTI   • Cellulitis of left leg   • BP (bullous pemphigoid)   • Bullous pemphigoid   • Lymphedema   • Sepsis (CMS/[facility])   • Sepsis(995.91)   • Chronic pain   • Prostate cancer (CMS/[facility])   • Leukocytosis   • Generalized weakness   • Generalized weakness   • Cellulitis of left leg   • Chronic osteomyelitis of left lower leg with draining sinus (CMS/[facility])   • Atrial fibrillation, chronic (CMS/[facility])   • Skin flap necrosis (CMS/[facility])   • CKD (chronic kidney disease), stage III   • Anemia, chronic disease   • Acute cholecystitis   • Pleural effusion, left       TASK MODIFICATION FOR EVALUATION ONLY (delete if not evaluation):  Task Modification: clinical decision making of moderate complexity, minimal to moderate task modification    SUBJECTIVE: Subjective: pt agreed to therapy (08/24/18  1601)    OBJECTIVE:  Precautions:  Precautions  Other Precautions: contact (08/24/18 1601)  Precautions Comments: activity as toleated (08/24/18 1601)  RN reported Navarro Fall Scale Score: 110 (>45 is considered at risk of fall)  Vitals:     Activity Tolerance:  limited by poor endurance  Exercise:       Functional Status (as of date/time noted)  ADLs:  Self Cares/ADL's  Grooming Assistance: Moderate Assist (Mod) (08/21/18 1237)  Bathing Assistance: Supine, bed (uppers max assist, lowers total) (08/15/18 0938)  Bathing Deficit: Right upper leg;Left upper leg;Buttocks;Perineal area (08/15/18 0938)  Upper Body Dressing Assistance: Moderate Assist (Mod) (08/21/18 1237)  Lower Body Clothing Assistance: Total Assist - Non-dependent (08/21/18 1237)  Footwear Assistance: Total Assist - Non-dependent (08/21/18 1237)  Self Cares/ADL's Comments #1: Pt required total assist with bathing LE due to incont. (08/21/18 1237)    Household Mobility:  Household Mobility  Rolling: Total Assist - Non-Dependent (08/24/18 1601)  Supine to Sit: Total Assist - Dependent (08/24/18 1601)  Bed to Chair: Total Assist - Dependent (marisela steady an max asisst x 2) (08/24/18 1601)  Sit to Stand: Total Assist - Dependent (max x 2) (08/24/18 1601)  Stand to Sit: Total Assist - Non-dependent (08/24/18 1601)  Stand Pivot Transfers: Maximal Assist (Max) (08/21/18 1237)  Transfer Equipment: gait belt and marisela steady (08/24/18 1601)  Sitting - Static: Minimal Assist (Min) (08/24/18 1601)  Sitting - Dynamic: Not attempted due to safety concerns (08/24/18 1601)  Standing - Static: Total Assist - Dependent (08/24/18 1601)  Standing - Dynamic: Total Assist - Dependent (08/24/18 1601)  Base of Support: Hip Width (08/24/18 1601)  Household Mobility Comments #1: Pt incont urine and BM during sit-stand transfer. Pt states does not have any control  (08/21/18 8045)    Home Management:       See OT flowsheet for full details regarding the OT therapy  provided.    GOALS:  Short Term Goals to Be Reviewed On: 08/28/18 (08/24/18 1601)  Short Term Goals Are The Same as Discharge Goals: No (08/24/18 1601)  Goal Agreement: Patient agrees with goals and treatment plan (08/24/18 1601)  Grooming Short Term Goal 1: set-up (08/21/18 1237)  Grooming Discharge Goal 1: set- up (08/15/18 0938)  Bathing Short Term Goal 1: Pt to wash UE with set-up (08/21/18 1237)  Bathing Discharge Goal 1: max assist (08/15/18 0938)  Dressing Short Term Goal 1: UE: Min A (08/21/18 1237)  Dressing Discharge Goal 1: lowers mod assist (08/15/18 0938)  Toileting Short Term Goal 1: Max A (08/21/18 1237)  Toileting Discharge Goal 1: CGA with urinal  (08/15/18 0938)  Home Setting Transfer Short Term Goal 1: Mod A of 1 (08/21/18 1237)  Home Setting Transfer Discharge Goal 1: min assist x 1 (08/15/18 0938)    EDUCATION:   On this date, the patient was educated on safety during ADL transfers.    The response to education was: Needs reinforcement    RECOMMENDATIONS FOR DISCHARGE:  Recommendations for Discharge: OT: 24 Hour assist;Post acute therapy (08/24/18 1601)    PT/OT Mobility Equipment for Discharge: Pt. owns 2WW, 4WW & W/C; will continue to assess as pt will need lift equipment for transfers if pt plans to return home (08/24/18 1601)  PT/OT ADL Equipment for Discharge: Pt. has grab bars, tub chair, & raised toilet seat. Will continue to assess pending D/C location  (08/24/18 1601)       No

## 2025-07-08 ENCOUNTER — RESULT REVIEW (OUTPATIENT)
Age: 40
End: 2025-07-08

## 2025-07-08 VITALS
OXYGEN SATURATION: 100 % | SYSTOLIC BLOOD PRESSURE: 103 MMHG | TEMPERATURE: 98 F | RESPIRATION RATE: 16 BRPM | HEART RATE: 73 BPM | DIASTOLIC BLOOD PRESSURE: 63 MMHG

## 2025-07-08 PROCEDURE — 93306 TTE W/DOPPLER COMPLETE: CPT | Mod: 26

## 2025-07-08 PROCEDURE — 99239 HOSP IP/OBS DSCHRG MGMT >30: CPT

## 2025-07-08 RX ADMIN — Medication 150 MILLILITER(S): at 08:55

## 2025-07-08 RX ADMIN — Medication 600 MILLIGRAM(S): at 00:18

## 2025-07-08 NOTE — ED CDU PROVIDER SUBSEQUENT DAY NOTE - CLINICAL SUMMARY MEDICAL DECISION MAKING FREE TEXT BOX
Pt is a 39 YO F with no significant PMH who presented to ED with dizziness/lightheadedness x 3 days. In ED, no acute electrolyte abnormalities, trop <6, CT imaging with no acute findings. Pt seen and examined by Neurology. Pt placed in CDU for supportive care, cardiac monitoring, echo.

## 2025-07-08 NOTE — ED CDU PROVIDER DISPOSITION NOTE - CLINICAL COURSE
STEVE Alonzo: 40-year-old female with no significant past medical history presented to the emergency department complaining of dizziness.  Patient was seen and cleared by neurology.  Patient was placed in CDU for cardiac monitoring, echo and reassessment.  Patient feels improved, ambulatory without difficulty, discharge and results reviewed with patient.

## 2025-07-08 NOTE — ED CDU PROVIDER DISPOSITION NOTE - NSFOLLOWUPINSTRUCTIONS_ED_ALL_ED_FT
Follow up with your Primary Medical Doctor in 1-2 days.  Follow up with Neurology in 1-2 days see attached list.  Rest.  Stay well hydrated.  Return to the ER for any persistent/worsening or new symptoms chest pain, shortness of breath, weakness, dizziness or any concerning symptoms.

## 2025-07-08 NOTE — ED CDU PROVIDER DISPOSITION NOTE - ATTENDING CONTRIBUTION TO CARE
Efrain Lu DO:  patient seen and evaluated with the PA.  I was present for key portions of the History & Physical, and I agree with the Impression & Plan. 41 yo f No reported PMH, no PSH, PW dizziness/lightheadedness.  Reports LOC.  Denies all acute complaints this morning.  Patient was seen by neurology upon initial evaluation. Recommendations appreciated, results reviewed.  DC with strict return precautions.

## 2025-07-08 NOTE — ED CDU PROVIDER SUBSEQUENT DAY NOTE - HISTORY
Pt is a 39 YO F with no significant PMH who presented to ED with dizziness/lightheadedness x 3 days. In ED, no acute electrolyte abnormalities, trop <6, CT imaging with no acute findings. Pt seen and examined by Neurology. Pt placed in CDU for supportive care, cardiac monitoring, echo.  In the interim- no events on tele, pt reports mild improvement

## 2025-07-08 NOTE — ED CDU PROVIDER SUBSEQUENT DAY NOTE - ATTENDING APP SHARED VISIT CONTRIBUTION OF CARE
Efrain Lu DO:  patient seen and evaluated with the PA.  I was present for key portions of the History & Physical, and I agree with the Impression & Plan. 39 yo f No reported PMH, no PSH, PW dizziness/lightheadedness.  Reports LOC.  Denies all acute complaints this morning.  Patient was seen by neurology upon initial evaluation.  Deemed to not require MRI.  In CDU for supportive care, TTE.

## 2025-07-08 NOTE — ED CDU PROVIDER DISPOSITION NOTE - PATIENT PORTAL LINK FT
You can access the FollowMyHealth Patient Portal offered by [facility] by registering at the following website: [email or web address]. By joining [facility]’s FollowMyHealth portal, you will also be able to view your health information using other applications (apps) compatible with our system.

## 2025-07-15 NOTE — ED PROVIDER NOTE - DISPOSITION TYPE
Next appt 12/1/25  Last appt 6/6/25    Refill request for  atorvastatin (LIPITOR) 10 MG tablet     Routing to pcp to approve.   
Medication failed protocol.    Medication: atorvastatin  Medication Refill Protocol Failed.  Failed criteria: see below. Sent to clinician to review.     Hmg CoA Reductase Inhibitors (Statin) Refill Protocol - 12 Month Protocol Efgmmy34/15/2025 10:37 AM   Protocol Details Lipid Panel or Direct LDL resulted within last 15 months     Pharmacy: [facility] - [address]    Last office visit date: 4/8/25  Next appointment scheduled?: Yes   
DISCHARGE

## 2025-07-20 ENCOUNTER — EMERGENCY (EMERGENCY)
Facility: HOSPITAL | Age: 40
LOS: 1 days | End: 2025-07-20
Attending: EMERGENCY MEDICINE | Admitting: EMERGENCY MEDICINE
Payer: COMMERCIAL

## 2025-07-20 VITALS
HEIGHT: 65 IN | RESPIRATION RATE: 16 BRPM | DIASTOLIC BLOOD PRESSURE: 87 MMHG | OXYGEN SATURATION: 100 % | WEIGHT: 149.91 LBS | HEART RATE: 68 BPM | SYSTOLIC BLOOD PRESSURE: 134 MMHG | TEMPERATURE: 98 F

## 2025-07-20 DIAGNOSIS — Z98.890 OTHER SPECIFIED POSTPROCEDURAL STATES: Chronic | ICD-10-CM

## 2025-07-20 DIAGNOSIS — Z90.721 ACQUIRED ABSENCE OF OVARIES, UNILATERAL: Chronic | ICD-10-CM

## 2025-07-20 PROCEDURE — 99285 EMERGENCY DEPT VISIT HI MDM: CPT

## 2025-07-20 PROCEDURE — 93010 ELECTROCARDIOGRAM REPORT: CPT

## 2025-07-20 NOTE — ED ADULT TRIAGE NOTE - GLASGOW COMA SCALE: BEST MOTOR RESPONSE, MLM
CC: Annual check-up, +upt    SUBJECTIVE:   34 y.o. female   for annual routine Pap and checkup. No LMP recorded. Patient is pregnant..  She complains of daily HA's only when pregnant, needs refill of fioricet.    's no complications  Saw midwife in  bc couldn't get thru scheduling to get appt with me  Wants BTL, will be putting baby up for adoption  Lives in [place]  Past Medical History:   Diagnosis Date    ADD (attention deficit disorder)     Chronic hypertension     no meds- states anxiety induced     History reviewed. No pertinent surgical history.  Social History     Socioeconomic History    Marital status: Single   Tobacco Use    Smoking status: Every Day     Packs/day: 0.50     Types: Cigarettes    Smokeless tobacco: Current    Tobacco comments:     Pt states quit when she learned that she was pregnant.    Substance and Sexual Activity    Alcohol use: No    Drug use: Not Currently     Types: Marijuana    Sexual activity: Yes     Partners: Male     Birth control/protection: None     Family History   Problem Relation Age of Onset    Cancer Paternal Grandfather     Dementia Paternal Grandmother     No Known Problems Maternal Grandmother     No Known Problems Maternal Grandfather     Hypertension Father     Alcohol abuse Father     Heart attack Mother     Alcohol abuse Mother     No Known Problems Sister     Breast cancer Neg Hx     Colon cancer Neg Hx     Ovarian cancer Neg Hx      OB History    Para Term  AB Living   10 6 6 0 3 6   SAB IAB Ectopic Multiple Live Births   1 2 0 0 5      # Outcome Date GA Lbr Todd/2nd Weight Sex Delivery Anes PTL Lv   10 Current            9 Term 21 38w4d  2.985 kg (6 lb 9.3 oz) M Vag-Spont Spinal, EPI N MYLES   8 Term 21 38w1d  2.808 kg (6 lb 3.1 oz) F Vag-Spont Spinal, EPI N MYLES   7 SAB 19 19w0d  0.226 kg (8 oz) M Vag-Spont None Y FD      Complications: Fetal demise due to miscarriage   6 Term 18    F Vag-Spont         Birth  Comments: baby given up for adoption   5 Term 08/05/17    F Vag-Spont   MYLES      Birth Comments: baby up for adoption   4 Term 07/01/11 37w0d  2.495 kg (5 lb 8 oz) F Vag-Spont EPI N MYLES      Complications: Oligohydramnios   3 IAB 2008           2 IAB 2007           1 Term 04/22/04 40w0d  3.912 kg (8 lb 10 oz) M Vag-Spont EPI N MYLES         Current Outpatient Medications   Medication Sig Dispense Refill    PNV no.106-iron-folate #6-dha (OB COMPLETE GOLD) 27.5 mg iron- 1 mg Cap Take 27.5 mg by mouth once daily. 30 capsule 11    butalbital-acetaminophen-caffeine -40 mg (FIORICET, ESGIC) -40 mg per tablet Take 1 tablet by mouth every 4 (four) hours as needed for Pain. 30 tablet 0     No current facility-administered medications for this visit.     Allergies: Patient has no known allergies.     ROS:  Constitutional: no weight loss, weight gain, fever, fatigue  Eyes:  No vision changes, glasses/contacts  ENT/Mouth: No ulcers, sinus problems, ears ringing, headache  Cardiovascular: No inability to lie flat, chest pain, exercise intolerance, swelling, heart palpitations  Respiratory: No wheezing, coughing blood, shortness of breath, or cough  Gastrointestinal: No diarrhea, bloody stool, nausea/vomiting, constipation, gas, hemorrhoids  Genitourinary: No blood in urine, painful urination, urgency of urination, frequency of urination, incomplete emptying, incontinence, abnormal bleeding, painful periods, heavy periods, vaginal discharge, vaginal odor, painful intercourse, sexual problems, bleeding after intercourse.  Musculoskeletal: No muscle weakness  Skin/Breast: No painful breasts, nipple discharge, masses, rash, ulcers  Neurological: No passing out, seizures, numbness, headache  Endocrine: No diabetes, hypothyroid, hyperthyroid, hot flashes, hair loss, abnormal hair growth, ance  Psychiatric: No depression, crying  Hematologic: No bruises, bleeding, swollen lymph nodes, anemia.      OBJECTIVE:   The patient  appears well, alert, oriented x 3, in no distress.  Wt 73.3 kg (161 lb 9.6 oz)   BMI 21.92 kg/m²   NECK: no thyromegaly, trachea midline  SKIN: no acne, striae, hirsutism  BREAST EXAM: not examined  ABDOMEN: no hernias, masses, or hepatosplenomegaly    Indication   ========   Indication: Fetal Anatomic Survey   Indication: Estimation of Gestational Age     History   ======   Previous Outcomes    8   Para 4     Method   ======   Transabdominal ultrasound examination     Pregnancy   =========   Reyes pregnancy. Number of fetuses: 1     Dating   ======   Ultrasound examination on: 2022   GA by U/S based upon: AC, BPD, Femur, HC   GA by U/S 25 w + 3 d   ERA by U/S: 3/11/2023   Assigned: based on ultrasound (AC, BPD, Femur, HC), selected on 2022   Assigned GA 25 w + 3 d   Assigned ERA: 3/11/2023     General Evaluation   ==============   Cardiac activity present.  bpm. Fetal movements: visualized. Presentation: cephalic   Placenta: Placental site: anterior   Umbilical cord: Cord vessels: 3 vessel cord. Insertion site: normal insertion   Amniotic fluid: Amount of AF: normal amount     Fetal Biometry   ============   Standard   BPD 62.2 mm 25w 2d Hadlock   OFD 82.2 mm 26w 5d Lj   .2 mm 25w 0d Hadlock   .6 mm 25w 2d Hadlock   Femur 47.8 mm 26w 0d Hadlock   Humerus 42.6 mm 25w 4d Lj   HC / AC 1.11    g 25w 2d 48% Rajinder   EFW (lb) 1 lb   EFW (oz) 13 oz   EFW by: Hadlock (BPD-HC-AC-FL)   Head / Face / Neck   Cephalic index 0.76   Extremities / Bony Struc   FL / BPD 0.77   FL / HC 0.21   FL / AC 0.23   Other Structures    bpm     Fetal Anatomy   ===========   Cranium: appears normal   Lateral ventricles: normal   Choroid plexus: normal   Midline falx: normal   Cavum septi pellucidi: normal   Cerebellum: normal   Cisterna magna: normal   Lips: normal   Profile: suboptimal   Nose: normal   4-chamber view: normal   RVOT view: normal   LVOT view: normal   Heart /  Thorax   Situs: situs solitus (normal)   Diaphragm: normal   Cord insertion: normal   Stomach: normal   Kidneys: normal   Bladder: normal   Genitals: normal   Cervical spine: normal   Thoracic spine: normal   Lumbar spine: normal   Sacral spine: normal   Arms: both visualized   Hands: visualized   Legs: both visualized   Feet: visualized   Fetal sex: female     Maternal Structures   ===============   Uterus / Cervix   Uterus: Visualized   Cervix: Visualized     Impression   =========   Normal fetal anatomy with no obvious abnormalities.   Biometry is consistent with dating.   Normal amniotic fluid volume per qualitative assessment.   Normal placental location without evidence of previa.                                                        Sonographer: Tammy Bell   Electronically Signed by: Alma He at 12/02/2022-17:44    ASSESSMENT:   well woman  1. Nonintractable headache, unspecified chronicity pattern, unspecified headache type        PLAN:   pap smear  additional lab tests per orders  return annually or prn  Orders Placed This Encounter    butalbital-acetaminophen-caffeine -40 mg (FIORICET, ESGIC) -40 mg per tablet     Consents for delivery and Lap BTL signed today  Will do dm screen zamzam as ordered by Midwife  Wean of jay  Will discuss M-21 at next ov   (M6) obeys commands

## 2025-07-20 NOTE — ED ADULT TRIAGE NOTE - CHIEF COMPLAINT QUOTE
Pt c/o right arm numbness, dizziness, SOB since 3pm yesterday. Pt denies headache, chest pain, blurry vision. Pt noted to have right arm drift and decreased sensation. No code stroke activated due to symptom onset over 24 hours. Hx: Endometriosis

## 2025-07-21 LAB
ADD ON TEST-SPECIMEN IN LAB: SIGNIFICANT CHANGE UP
ALBUMIN SERPL ELPH-MCNC: 4 G/DL — SIGNIFICANT CHANGE UP (ref 3.3–5)
ALP SERPL-CCNC: 72 U/L — SIGNIFICANT CHANGE UP (ref 40–120)
ALT FLD-CCNC: 12 U/L — SIGNIFICANT CHANGE UP (ref 4–33)
ANION GAP SERPL CALC-SCNC: 11 MMOL/L — SIGNIFICANT CHANGE UP (ref 7–14)
AST SERPL-CCNC: 22 U/L — SIGNIFICANT CHANGE UP (ref 4–32)
BILIRUB SERPL-MCNC: 0.6 MG/DL — SIGNIFICANT CHANGE UP (ref 0.2–1.2)
BUN SERPL-MCNC: 11 MG/DL — SIGNIFICANT CHANGE UP (ref 7–23)
CALCIUM SERPL-MCNC: 9 MG/DL — SIGNIFICANT CHANGE UP (ref 8.4–10.5)
CHLORIDE SERPL-SCNC: 103 MMOL/L — SIGNIFICANT CHANGE UP (ref 98–107)
CO2 SERPL-SCNC: 24 MMOL/L — SIGNIFICANT CHANGE UP (ref 22–31)
CREAT SERPL-MCNC: 0.78 MG/DL — SIGNIFICANT CHANGE UP (ref 0.5–1.3)
EGFR: 98 ML/MIN/1.73M2 — SIGNIFICANT CHANGE UP
EGFR: 98 ML/MIN/1.73M2 — SIGNIFICANT CHANGE UP
GLUCOSE SERPL-MCNC: 96 MG/DL — SIGNIFICANT CHANGE UP (ref 70–99)
HCT VFR BLD CALC: 32.5 % — LOW (ref 34.5–45)
HGB BLD-MCNC: 10.4 G/DL — LOW (ref 11.5–15.5)
MCHC RBC-ENTMCNC: 23.3 PG — LOW (ref 27–34)
MCHC RBC-ENTMCNC: 32 G/DL — SIGNIFICANT CHANGE UP (ref 32–36)
MCV RBC AUTO: 72.9 FL — LOW (ref 80–100)
NRBC # BLD AUTO: 0 K/UL — SIGNIFICANT CHANGE UP (ref 0–0)
NRBC # FLD: 0 K/UL — SIGNIFICANT CHANGE UP (ref 0–0)
NRBC BLD AUTO-RTO: 0 /100 WBCS — SIGNIFICANT CHANGE UP (ref 0–0)
PLATELET # BLD AUTO: 230 K/UL — SIGNIFICANT CHANGE UP (ref 150–400)
PMV BLD: 11.2 FL — SIGNIFICANT CHANGE UP (ref 7–13)
POTASSIUM SERPL-MCNC: 3.8 MMOL/L — SIGNIFICANT CHANGE UP (ref 3.5–5.3)
POTASSIUM SERPL-SCNC: 3.8 MMOL/L — SIGNIFICANT CHANGE UP (ref 3.5–5.3)
PROT SERPL-MCNC: 6.9 G/DL — SIGNIFICANT CHANGE UP (ref 6–8.3)
RBC # BLD: 4.46 M/UL — SIGNIFICANT CHANGE UP (ref 3.8–5.2)
RBC # FLD: 13.7 % — SIGNIFICANT CHANGE UP (ref 10.3–14.5)
SODIUM SERPL-SCNC: 138 MMOL/L — SIGNIFICANT CHANGE UP (ref 135–145)
WBC # BLD: 4.86 K/UL — SIGNIFICANT CHANGE UP (ref 3.8–10.5)
WBC # FLD AUTO: 4.86 K/UL — SIGNIFICANT CHANGE UP (ref 3.8–10.5)

## 2025-07-21 PROCEDURE — 70496 CT ANGIOGRAPHY HEAD: CPT | Mod: 26

## 2025-07-21 PROCEDURE — 70553 MRI BRAIN STEM W/O & W/DYE: CPT | Mod: 26

## 2025-07-21 PROCEDURE — 70498 CT ANGIOGRAPHY NECK: CPT | Mod: 26

## 2025-07-21 PROCEDURE — 99223 1ST HOSP IP/OBS HIGH 75: CPT

## 2025-07-21 RX ORDER — KETOROLAC TROMETHAMINE 30 MG/ML
15 INJECTION, SOLUTION INTRAMUSCULAR; INTRAVENOUS ONCE
Refills: 0 | Status: DISCONTINUED | OUTPATIENT
Start: 2025-07-21 | End: 2025-07-21

## 2025-07-21 RX ORDER — ACETAMINOPHEN 500 MG/5ML
1000 LIQUID (ML) ORAL ONCE
Refills: 0 | Status: COMPLETED | OUTPATIENT
Start: 2025-07-21 | End: 2025-07-21

## 2025-07-21 RX ORDER — ASPIRIN 325 MG
81 TABLET ORAL DAILY
Refills: 0 | Status: DISCONTINUED | OUTPATIENT
Start: 2025-07-21 | End: 2025-07-24

## 2025-07-21 RX ADMIN — KETOROLAC TROMETHAMINE 15 MILLIGRAM(S): 30 INJECTION, SOLUTION INTRAMUSCULAR; INTRAVENOUS at 21:40

## 2025-07-21 RX ADMIN — Medication 400 MILLIGRAM(S): at 16:54

## 2025-07-21 RX ADMIN — Medication 81 MILLIGRAM(S): at 05:46

## 2025-07-21 NOTE — ED PROVIDER NOTE - ATTENDING CONTRIBUTION TO CARE
DR. BLOCH, ATTENDING MD-  I performed a face to face bedside interview with patient regarding history of present illness, review of symptoms and past medical history. I completed an independent physical exam.  I have discussed patient's plan of care with the resident.  Patient well-appearing no acute distress HEENT unremarkable full extraocular motions no nystagmus cranial nerves II through XII intact neck supple no carotid bruits heart sounds S1-S2 lungs clear abdomen soft nontender extremities right arm and leg subjective decreased sensation with 4 out of 5 strength right leg otherwise 5/5 throughout and no cerebellar deficits noted.

## 2025-07-21 NOTE — PHYSICAL THERAPY INITIAL EVALUATION ADULT - PERTINENT HX OF CURRENT PROBLEM, REHAB EVAL
41 yo female with PMH of endometriosis and anemia presents with persistent lightheadedness and gait instability since 1 week ago, and right arm and leg weakness and numbness since 2 days ago. Patient was seen in the ED on 7/7/25 for lightheadedness. Patient was discharged from the CDU after symptoms improved, but reports the lightheadedness and gait changes never fully resolved. Patient now reports new symptoms of right arm weakness, numbness, and tingling which started 2 days ago, and progressed to right leg weakness yesterday, which brought her back to the ED. Denies speech changes, vision changes, headache. CT BRAIN:  No acute intracranial hemorrhage, mass effect, or CT evidence of an acute or recent subacute transcortical infarct. CTA NECK:  No significant stenosis of the cervical carotid or vertebral arteries.  CTA HEAD:  No significant stenosis or occlusion of the major proximal branches of the [place] of [place].

## 2025-07-21 NOTE — CONSULT NOTE ADULT - TIME BILLING
I spent 55 minutes in preparation to see the patient, including reviewing the brain imaging in past one year, obtaining and/or reviewing the resident/ or PA note, separately obtained history, performing a medically appropriate examination and/or evaluation as documented in this encounter note, counseling and educating of the patient, ordering tests, documenting clinical information in patients electronic record , interpreting results (not separately reported) and communicating results to the patient.   Evaluation was performed on 7/21/25  Rt sided weakness of unclear etiology  CTH/MRI: neg (only incidental DVA)   Etiology: r/o cervical disease vs FND     Neuro exam:  OX4 . nl attention/ concentration span. No aphasia. Naming /repetition intact   CN: JOAN, EOMI, speech is intact. facial sensation normal, tongue midline, symmetric face   Motor: poor effort with fluctuating 4/5 R sided weakness.  Sens: Intact B/L   Reflexes: Symmetric and normal   Coord:  No dysmetria   Gait: deferred    Plan:  MRI C-spine  PT/OT eval  if neg, will s/o     Hemant Brooks MD   Stroke team attending   [facility]

## 2025-07-21 NOTE — ED ADULT NURSE NOTE - OBJECTIVE STATEMENT
Received pt AOx4, in no acute distress, resting comfortably on stretcher. Pt, PMHx endometriosis, presented to the ED c/o sudden onset R arm numbness, dizziness and SOB that started yesterday around 3PM. Pt reports she started her menses (spotted yesterday) and states she usually gets slightly dizzy but was concerned about the numbness. No facial droop or slurred speech noted, pt able to speak in full sentences. Strong  b/l UE. Denies fever, body aches, chills, n/v, diarrhea, CP, lightheadedness, room-spinning sensation, abd. pain, back pain, urinary sx. Resp. equal and unlabored. 20g IV to RAC, + blood return. Labs sent, pending results.  Safety precautions maintained. CT pending.

## 2025-07-21 NOTE — PHYSICAL THERAPY INITIAL EVALUATION ADULT - GROSSLY INTACT, SENSORY
RUE and Right thigh increased sensitivity; LUE and LLE intact; pt reports RUE and RLE numbness and tingling and left hand tingling since having IV put in.

## 2025-07-21 NOTE — ED CDU PROVIDER INITIAL DAY NOTE - CLINICAL SUMMARY MEDICAL DECISION MAKING FREE TEXT BOX
39 yo female with PMH of endometriosis and anemia presents with persistent dizz and gait instability since 1 week ago, and right arm and leg weakness and numbness since 2 days ago. Patient was seen in the ED on 7/7/25 for lightheadedness. Patient was discharged from the CDU after symptoms improved, but reports the sx gait changes never fully resolved. Patient now reports new symptoms of right arm weakness, numbness, and tingling which started 2 days ago, and progressed to right leg weakness yesterday, which brought her back to the ED. Denies speech changes, vision changes, headache.  Plan is CDU placement for MRI imaging to eval for a central etiology, telemetry monitoring, PT consultation, reassessment.

## 2025-07-21 NOTE — PHYSICAL THERAPY INITIAL EVALUATION ADULT - ADDITIONAL COMMENTS
Pt lives in an apartment with her cousin with a flight of stairs to negotiate to bedroom; (+)handrail. Prior to hospital admission, pt was completely independent and used no assistive device with ambulation. Pt denies any recent falls. Pt is right handed.    Pt left comfortable on stretcher, NAD, all lines intact, all precautions maintained, and WADE Baxter aware.

## 2025-07-21 NOTE — CONSULT NOTE ADULT - ASSESSMENT
41 yo female with PMH of endometriosis and anemia presents with persistent lightheadedness and gait instability since 1 week ago, and right arm and leg weakness and numbness since 2 days ago. Patient was seen in the ED on 7/7/25 for lightheadedness. Patient was discharged from the CDU after symptoms improved, but reports the lightheadedness and gait changes never fully resolved. Patient now reports new symptoms of right arm weakness, numbness, and tingling which started 2 days ago, and progressed to right leg weakness yesterday, which brought her back to the ED. Denies speech changes, vision changes, headache. On exam patient has mild weakness and numbness in RUE and RLE. CTH and CTA unremarkable.     mRS: 0  LKN: 7/19  NIHSS: 1    Not a tenecteplase candidate due to out of window.  Not a mechanical thrombectomy candidate due to no LVO.    Impression: R hemisensory motor deficits localizing to left brain dysfunction, possibly due to acute ischemic stroke vs demyelinating disease.    Recommendations:  [] MRI brain without contrast   [] Check orthostatic BP  [] TTE w/o bubble   [] Consider implantable loop recorder pending workup  [] Lipid panel, HbA1c  [] CBC, CMP, coagulation panel, troponin  [] ASA 81 mg PO (325 per rectum if fails dysphagia)   [] Will consider starting statin pending lipid panel  [] Lovenox SQ and SCDs for DVT prophylaxis   [] NPO unless passes dysphagia screen; swallow eval if fails  [] BP goal: normotensive up to 140/80  [] Telemonitoring  [] Stroke neurological checks and vital signs Q4  [] BGM goals 140-180  [] PT/OT; S/S evaluation    * Case and plan not final until Attending attestation * 39 yo female with PMH of endometriosis and anemia presents with persistent lightheadedness and gait instability since 1 week ago, and right arm and leg weakness and numbness since 2 days ago. Patient was seen in the ED on 7/7/25 for lightheadedness. Patient was discharged from the CDU after symptoms improved, but reports the lightheadedness and gait changes never fully resolved. Patient now reports new symptoms of right arm weakness, numbness, and tingling which started 2 days ago, and progressed to right leg weakness yesterday, which brought her back to the ED. Denies speech changes, vision changes, headache. On exam patient has mild weakness and numbness in RUE and RLE. CTH and CTA unremarkable.     mRS: 0  LKN: 7/19  NIHSS: 1    Not a tenecteplase candidate due to out of window.  Not a mechanical thrombectomy candidate due to no LVO.    Impression: R hemisensory motor deficits localizing to left brain dysfunction, possibly due to acute ischemic stroke vs demyelinating disease.    Recommendations:  [] MRI brain w/wo contrast   [] Check orthostatic BP  [] TTE w/o bubble   [] Consider implantable loop recorder pending workup  [] Lipid panel, HbA1c  [] CBC, CMP, coagulation panel, troponin  [] ASA 81 mg PO (325 per rectum if fails dysphagia)   [] Will consider starting statin pending lipid panel  [] Lovenox SQ and SCDs for DVT prophylaxis   [] NPO unless passes dysphagia screen; swallow eval if fails  [] BP goal: normotensive up to 140/80  [] Telemonitoring  [] Stroke neurological checks and vital signs Q4  [] BGM goals 140-180  [] PT/OT; S/S evaluation    * Case and plan not final until Attending attestation *

## 2025-07-21 NOTE — CONSULT NOTE ADULT - SUBJECTIVE AND OBJECTIVE BOX
Neurology - Consult Note    Spectra: [id number] ([facility]), [phone] (J)    HPI: 41 yo female with PMH of endometriosis and anemia presents with persistent lightheadedness and gait instability since 1 week ago, and right arm and leg weakness and numbness since 2 days ago. Patient was seen in the ED on 7/7/25 for lightheadedness. Patient was discharged from the CDU after symptoms improved, but reports the lightheadedness and gait changes never fully resolved. Patient now reports new symptoms of right arm weakness, numbness, and tingling which started 2 days ago, and progressed to right leg weakness yesterday, which brought her back to the ED. Denies speech changes, vision changes, headache.       Review of Systems:   CONSTITUTIONAL: No fevers or chills  EYES AND ENT: No visual changes or no throat pain   NECK: No pain or stiffness  RESPIRATORY: No shortness of breath  CARDIOVASCULAR: No chest pain or palpitations  GASTROINTESTINAL: No nausea or vomiting   GENITOURINARY: No dysuria  NEUROLOGICAL: +As stated in HPI above  SKIN: No itching, burning, rashes, or lesions   All other review of systems is negative unless indicated above.    Allergies:  No Known Allergies      PMHx/PSHx/Family Hx: As above, otherwise see below   Endometriosis        Social Hx:  Never smoker; no current use of tobacco, alcohol, or illicit drugs    Medications:  MEDICATIONS  (STANDING):    MEDICATIONS  (PRN):      Vitals:  T(C): 36.4 (07-21-25 @ 02:20), Max: 36.6 (07-20-25 @ 22:52)  HR: 61 (07-21-25 @ 02:20) (61 - 68)  BP: 104/63 (07-21-25 @ 02:20) (104/63 - 134/87)  RR: 18 (07-21-25 @ 02:20) (16 - 18)  SpO2: 98% (07-21-25 @ 02:20) (98% - 100%)    Physical Examination:  General - non-toxic appearing, in no acute distress  Cardiovascular - peripheral pulses palpable, no edema  Respiratory - breathing comfortably with no increased work of breathing    Neurologic Exam:  Mental status - Awake, Alert, Oriented to person, place, and time. Speech fluent, repetition and naming intact. Follows simple and complex commands. Attention/concentration intact    Cranial nerves - PERRLA (4mm -> 3mm b/l), VFF, EOMI, face sensation (V1-V3) intact b/l, facial strength intact without asymmetry b/l, hearing intact b/l, palate with symmetric elevation, trapezius 5/5 strength b/l, tongue midline on protrusion with full lateral movement    Motor - Normal bulk and tone throughout. No pronator drift. Left arm orbits right arm.     Strength testing                              Right           Left  Should-Abduct       4+                 5  Elbow-Flex             5                   5  Elbow-Ext              5                   5  Wrist-Flex              5                   5  Wrist-Ext               4+                 5  Interossei              4                   5                        4+                 5    Hip-Flex                 4                   5  Knee-Flex              5                   5  Knee-Ext                4                   5  Dorsiflex                5                   5  Plantarflex             5                   5    Sensation - Decreased to light touch in entire RUE and proximal RLE.    Reflexes:                                             Right             Left  Triceps                     1+                1+  Biceps                      1+                1+  Brachioradialis          1+                1+  Patellar                    2+                 2+  Achilles                    2+                 2+  Plantar                   Down            Down    Coordination - Finger to Nose intact b/l. HKS intact bilaterally. No tremors appreciated    Gait and station - Slow, hesitant gait    Labs:                        10.4   4.86  )-----------( 230      ( 21 Jul 2025 00:10 )             32.5     07-21    138  |  103  |  11  ----------------------------<  96  3.8   |  24  |  0.78    Ca    9.0      21 Jul 2025 00:10    TPro  6.9  /  Alb  4.0  /  TBili  0.6  /  DBili  x   /  AST  22  /  ALT  12  /  AlkPhos  72  07-21    CAPILLARY BLOOD GLUCOSE      POCT Blood Glucose.: 103 mg/dL (20 Jul 2025 22:55)    LIVER FUNCTIONS - ( 21 Jul 2025 00:10 )  Alb: 4.0 g/dL / Pro: 6.9 g/dL / ALK PHOS: 72 U/L / ALT: 12 U/L / AST: 22 U/L / GGT: x               CSF:                  Radiology:    CT BRAIN:  No acute intracranial hemorrhage, mass effect, or CT evidence   of an acute or recent subacute transcortical infarct.    CTA NECK:  No significant stenosis of the cervical carotid or vertebral   arteries.    CTA HEAD:  No significant stenosis or occlusion of the major proximal   branches of the [place].

## 2025-07-21 NOTE — PHYSICAL THERAPY INITIAL EVALUATION ADULT - ACTIVE RANGE OF MOTION EXAMINATION, REHAB EVAL
kimberly. upper extremity Active ROM was WNL (within normal limits)/bilateral lower extremity Active ROM was WNL (within normal limits)

## 2025-07-21 NOTE — PHYSICAL THERAPY INITIAL EVALUATION ADULT - MANUAL MUSCLE TESTING RESULTS, REHAB EVAL
Right upper extremity 4-/5, Left upper extremity 4/5, Right lower extremity 3/5, left lower extremity 4-/5

## 2025-07-21 NOTE — ED PROVIDER NOTE - CLINICAL SUMMARY MEDICAL DECISION MAKING FREE TEXT BOX
40 year old female with PMH of endometriosis, anemia (2/2 menses), recent initiation of progesteron/estrogen presents with persistent lightheadedness and gait instability since 1 week ago, and right arm and leg weakness and numbness since 2 days ago. Patient was seen in the ED on 7/7/25 for lightheadedness and dizziness, no stroke code (outside window), negative CTAs, CDU but no MRI. She reports that lightheadedness and gait changes never fully resolved. Patient now reports new symptoms of right arm weakness, numbness, and tingling which started 2 days ago, and progressed to right leg weakness yesterday, which brought her back to the ED. Denies speech changes, vision changes, headache. R arm drift noted in triage. On exam patient has mild weakness and numbness in RUE and RLE. Otherwise neuro intact, no nystagmus, normal gait, but pt does have to let herself rest before rising from seated position or else appears pre-syncopal. Plan for rpt CTAs and bloodwork given recent new period that just ended several days ago. Will monitor and reassess.

## 2025-07-21 NOTE — PHYSICAL THERAPY INITIAL EVALUATION ADULT - GENERAL OBSERVATIONS, REHAB EVAL
Pt encountered in semisupine position, no distress,AxOx4, with +IV, and +tele. Pt agreeable to participate in PT evaluation. Heart rate 76bpm.

## 2025-07-21 NOTE — ED CDU PROVIDER INITIAL DAY NOTE - ATTENDING APP SHARED VISIT CONTRIBUTION OF CARE
Patient well-appearing no acute distress HEENT unremarkable heart sounds normal lungs clear subjective numbness right arm and right leg with mild weakness of the right lower extremity.  Patient also complaining of some dizziness/lightheadedness.

## 2025-07-21 NOTE — ED ADULT NURSE REASSESSMENT NOTE - NS ED NURSE REASSESS COMMENT FT1
Break RN. Received report from WADE Lamas. Pt laying in stretcher comfortably, NAD. Pt displays no nonverbal indicators of pain. Pt endorses numbness/tingling at this time but denies any decreased sensation. AxOx4. RR even and unlabored on room air. Pt pending CT scan. Safety maintained
Pt appears comfortable on stretcher, no signs of distress or discomfort. Resp. equal and unlabored. Pt c/o RUE numbness and now new onset RLE numbness, minimal drift noted to RLE, otherwise neuro findings wnl, no changes in mental status. Pt placed on continuous cardiac monitoring, sinus salma on the monitor (HR 56). Safety precautions maintained. MRI pending.
Pt is A&Ox4, resting in stretcher with no complaints at this time. Pt endorsing RUE and RLE numbness/tingling unchanged. NIH score 0 at this time. Neuro/sensory otherwise intact. No facial droop noted. Respirations even and unlabored, chest rise equal b/l. NSR noted on monitor. VS as noted in flow sheets. Pt denies chest pain, SOB, chills, abd pain, nausea, blurry vision, h/a, dizziness or any urinary symptoms at this time. No acute distress noted. Report given to CDU RN Marcin. Pt to be transported to CDU bed 9. Safety maintained throughout.
Received report from night shift RN. Pt is A&Ox4, resting in stretcher with no complaints at this time. Pt endorsing intermittent RUE and RLE numbness and tingling. Respirations even and unlabored, chest rise equal b/l. NSR noted on monitor. VS as noted in flow sheets. Pt denies chest pain, SOB, abd pain, N/V/D, h/a, dizziness, weakness, blurry vision or any urinary symptoms at this time. No acute distress noted. Safety maintained throughout.

## 2025-07-22 VITALS
DIASTOLIC BLOOD PRESSURE: 73 MMHG | TEMPERATURE: 98 F | RESPIRATION RATE: 16 BRPM | HEART RATE: 63 BPM | SYSTOLIC BLOOD PRESSURE: 108 MMHG | OXYGEN SATURATION: 100 %

## 2025-07-22 PROCEDURE — 72156 MRI NECK SPINE W/O & W/DYE: CPT | Mod: 26

## 2025-07-22 NOTE — ED CDU PROVIDER DISPOSITION NOTE - CLINICAL COURSE
41 YO F with PMH Endometriosis and Anemia who presented to ED with dizziness, gait instability x 1 week and 2 days or RUE and RLE numbness/weakness sensation. In ED, pt without electrolyte derangement, CTA imaging without acute findings. MRI negative. Pt seen and examined by Neurology, recommended, MRI c spine: Mild degenerative changes. No lesions. Patient seen by neurology and cleared for outpatient followup.

## 2025-07-22 NOTE — ED CDU PROVIDER SUBSEQUENT DAY NOTE - ATTENDING APP SHARED VISIT CONTRIBUTION OF CARE
40F h/o endometriosis and anemia.  Pt was here a few days ago for dizziness, had echo.  Today having R side arm weakness.  Neuro saw pt recc MRI H/N.  Still having some mild RUE / RLE weakness but able to go to bathroom.  MRI H/N no acute finding.  To be d/w neuro, but likely d/c home f/u neuro as outpt.    VS:  unremarkable    GEN - NAD;   well appearing;   A+O x3   HEAD - NC/AT     ENT - PEERL, EOMI, mucous membranes    moist , no discharge      NECK: Neck supple, non-tender without lymphadenopathy, no masses, no JVD  PULM - CTA b/l,  symmetric breath sounds  COR -  normal heart sounds    ABD - , ND, NT, soft,  BACK - no CVA tenderness, nontender spine     EXTREMS - no edema, no deformity, warm and well perfused    SKIN - no rash    or bruising      NEUROLOGIC - alert, face symmetric, speech fluent, sensation nl, motor mild RUE, RLE decreased strength.  LUE, LLE unaffected.  Pt ambulatory in no distress.

## 2025-07-22 NOTE — ED CDU PROVIDER DISPOSITION NOTE - NSFOLLOWUPCLINICS_GEN_ALL_ED_FT
[facility]  Neurology [facility]  [address] [place] [address]  Phone: [phone]  Fax: [phone]

## 2025-07-22 NOTE — ED CDU PROVIDER SUBSEQUENT DAY NOTE - CLINICAL SUMMARY MEDICAL DECISION MAKING FREE TEXT BOX
Patient is a 41 YO F with PMH Endometriosis and Anemia who presented to ED with dizziness, gait instability x 1 week and 2 days or RUE and RLE numbness/weakness sensation. In ED, pt without electrolyte derangement, CTA imaging without acute findings. MRI negative. Pt seen and examined by Neurology. Pt pending MRI c spine.

## 2025-07-22 NOTE — ED CDU PROVIDER SUBSEQUENT DAY NOTE - HISTORY
Patient is a 41 YO F with PMH Endometriosis and Anemia who presented to ED with dizziness, gait instability x 1 week and 2 days or RUE and RLE numbness/weakness sensation. In ED, pt without electrolyte derangement, CTA imaging without acute findings. MRI negative. Pt seen and examined by Neurology. Pt pending MRI c spine.  In the interim- pt reports persistent symptoms, resting comfortably in NAD.

## 2025-07-22 NOTE — ED CDU PROVIDER SUBSEQUENT DAY NOTE - CPE EDP RESP NORM
Telephone Encounter by Gulshan Faulkner RN at 10/22/18 08:07 AM     Author:  Gulshan Faulkner RN Service:  (none) Author Type:  Registered Nurse     Filed:  10/22/18 08:07 AM Encounter Date:  10/19/2018 Status:  Signed     :  Gulshan Faulkner RN (Registered Nurse)            No fax received, please resend[CG1.1M]      Revision History        User Key Date/Time User Provider Type Action    > CG1.1 10/22/18 08:07 AM Gulshan Faulkner RN Registered Nurse Sign    M - Manual             normal...

## 2025-07-22 NOTE — ED CDU PROVIDER DISPOSITION NOTE - PATIENT PORTAL LINK FT
You can access the FollowMyHealth Patient Portal offered by [facility] by registering at the following website: [email or web address]. By joining [facility]’s FollowMyHealth portal, you will also be able to view your health information using other applications (apps) compatible with our system.

## 2025-07-23 PROCEDURE — 99239 HOSP IP/OBS DSCHRG MGMT >30: CPT

## (undated) DEVICE — XI ARM SCISSOR MONO CURVED

## (undated) DEVICE — UTERINE MANIPULATOR CONMED VCARE DX

## (undated) DEVICE — XI ARM FORCEP PROGRASP 8MM

## (undated) DEVICE — POSITIONER PINK PAD PIGAZZI SYSTEM XL W ARM PROTECTOR

## (undated) DEVICE — D HELP - CLEARVIEW CLEARIFY SYSTEM

## (undated) DEVICE — XI SEAL UNIV 5- 8 MM

## (undated) DEVICE — TROCAR COVIDIEN VERSAPORT BLADELESS OPTICAL 5MM STANDARD

## (undated) DEVICE — XI ARM NEEDLE DRIVER LARGE

## (undated) DEVICE — Device

## (undated) DEVICE — NDL WILLIAMS CYSTOSCOPIC INJECTION 5FR 23G X 35CM

## (undated) DEVICE — TUBING STRYKER PNEUMOCLEAR SMOKE EVACUATION HIGH FLOW

## (undated) DEVICE — DRAPE 3/4 SHEET 52X76"

## (undated) DEVICE — SUT VLOC 180 2-0 9" GS-22 GREEN

## (undated) DEVICE — DRSG DERMABOND 0.7ML

## (undated) DEVICE — SUT VICRYL 0 27" UR-6

## (undated) DEVICE — APPLICATOR SURGICEL LAP TROCAR POINT 2.5MM X 150MM

## (undated) DEVICE — PACK GYN WDC

## (undated) DEVICE — SUT MONOCRYL 4-0 27" PS-2 UNDYED

## (undated) DEVICE — XI ARM GRASPER BIPOLAR LONG 8MM

## (undated) DEVICE — XI TIP COVER

## (undated) DEVICE — XI DRAPE ARM

## (undated) DEVICE — XI DRAPE COLUMN

## (undated) DEVICE — TUBING TUR 2 PRONG

## (undated) DEVICE — XI ARM FORCEP TENACULUM

## (undated) DEVICE — XI OBTURATOR OPTICAL BLADELESS 8MM

## (undated) DEVICE — GLV 7.5 PROTEXIS (WHITE)

## (undated) DEVICE — FOLEY TRAY 16FR 5CC LF UMETER CLOSED